# Patient Record
Sex: FEMALE | Race: WHITE | NOT HISPANIC OR LATINO | Employment: FULL TIME | URBAN - METROPOLITAN AREA
[De-identification: names, ages, dates, MRNs, and addresses within clinical notes are randomized per-mention and may not be internally consistent; named-entity substitution may affect disease eponyms.]

---

## 2018-04-19 ENCOUNTER — HOSPITAL ENCOUNTER (OUTPATIENT)
Dept: RADIOLOGY | Facility: MEDICAL CENTER | Age: 53
End: 2018-04-19

## 2018-04-20 ENCOUNTER — HOSPITAL ENCOUNTER (OUTPATIENT)
Dept: RADIOLOGY | Facility: MEDICAL CENTER | Age: 53
End: 2018-04-20
Attending: FAMILY MEDICINE
Payer: COMMERCIAL

## 2018-04-20 DIAGNOSIS — Z12.31 VISIT FOR SCREENING MAMMOGRAM: ICD-10-CM

## 2018-04-20 PROCEDURE — 77067 SCR MAMMO BI INCL CAD: CPT

## 2019-02-08 ENCOUNTER — HOSPITAL ENCOUNTER (OUTPATIENT)
Dept: LAB | Facility: MEDICAL CENTER | Age: 54
End: 2019-02-08
Attending: PHYSICIAN ASSISTANT
Payer: COMMERCIAL

## 2019-02-08 ENCOUNTER — OFFICE VISIT (OUTPATIENT)
Dept: URGENT CARE | Facility: CLINIC | Age: 54
End: 2019-02-08
Payer: COMMERCIAL

## 2019-02-08 ENCOUNTER — APPOINTMENT (OUTPATIENT)
Dept: RADIOLOGY | Facility: IMAGING CENTER | Age: 54
End: 2019-02-08
Attending: PHYSICIAN ASSISTANT
Payer: COMMERCIAL

## 2019-02-08 VITALS
WEIGHT: 293 LBS | RESPIRATION RATE: 18 BRPM | HEART RATE: 62 BPM | SYSTOLIC BLOOD PRESSURE: 134 MMHG | DIASTOLIC BLOOD PRESSURE: 84 MMHG | BODY MASS INDEX: 48.82 KG/M2 | OXYGEN SATURATION: 95 % | HEIGHT: 65 IN | TEMPERATURE: 97.9 F

## 2019-02-08 DIAGNOSIS — R10.13 EPIGASTRIC PAIN: ICD-10-CM

## 2019-02-08 DIAGNOSIS — R07.9 CHEST PAIN, UNSPECIFIED TYPE: ICD-10-CM

## 2019-02-08 LAB
ALBUMIN SERPL BCP-MCNC: 4.3 G/DL (ref 3.2–4.9)
ALBUMIN/GLOB SERPL: 1.4 G/DL
ALP SERPL-CCNC: 65 U/L (ref 30–99)
ALT SERPL-CCNC: 19 U/L (ref 2–50)
ANION GAP SERPL CALC-SCNC: 8 MMOL/L (ref 0–11.9)
AST SERPL-CCNC: 18 U/L (ref 12–45)
BASOPHILS # BLD AUTO: 0.5 % (ref 0–1.8)
BASOPHILS # BLD: 0.04 K/UL (ref 0–0.12)
BILIRUB SERPL-MCNC: 0.4 MG/DL (ref 0.1–1.5)
BUN SERPL-MCNC: 11 MG/DL (ref 8–22)
CALCIUM SERPL-MCNC: 10.1 MG/DL (ref 8.5–10.5)
CHLORIDE SERPL-SCNC: 101 MMOL/L (ref 96–112)
CO2 SERPL-SCNC: 29 MMOL/L (ref 20–33)
CREAT SERPL-MCNC: 0.63 MG/DL (ref 0.5–1.4)
EOSINOPHIL # BLD AUTO: 0.22 K/UL (ref 0–0.51)
EOSINOPHIL NFR BLD: 2.5 % (ref 0–6.9)
ERYTHROCYTE [DISTWIDTH] IN BLOOD BY AUTOMATED COUNT: 47.8 FL (ref 35.9–50)
GLOBULIN SER CALC-MCNC: 3.1 G/DL (ref 1.9–3.5)
GLUCOSE SERPL-MCNC: 92 MG/DL (ref 65–99)
HCT VFR BLD AUTO: 43.5 % (ref 37–47)
HGB BLD-MCNC: 13.6 G/DL (ref 12–16)
IMM GRANULOCYTES # BLD AUTO: 0.03 K/UL (ref 0–0.11)
IMM GRANULOCYTES NFR BLD AUTO: 0.3 % (ref 0–0.9)
LIPASE SERPL-CCNC: 12 U/L (ref 11–82)
LYMPHOCYTES # BLD AUTO: 2.56 K/UL (ref 1–4.8)
LYMPHOCYTES NFR BLD: 29.4 % (ref 22–41)
MCH RBC QN AUTO: 28.2 PG (ref 27–33)
MCHC RBC AUTO-ENTMCNC: 31.3 G/DL (ref 33.6–35)
MCV RBC AUTO: 90.2 FL (ref 81.4–97.8)
MONOCYTES # BLD AUTO: 0.5 K/UL (ref 0–0.85)
MONOCYTES NFR BLD AUTO: 5.7 % (ref 0–13.4)
NEUTROPHILS # BLD AUTO: 5.35 K/UL (ref 2–7.15)
NEUTROPHILS NFR BLD: 61.6 % (ref 44–72)
NRBC # BLD AUTO: 0 K/UL
NRBC BLD-RTO: 0 /100 WBC
PLATELET # BLD AUTO: 326 K/UL (ref 164–446)
PMV BLD AUTO: 10.6 FL (ref 9–12.9)
POTASSIUM SERPL-SCNC: 3.6 MMOL/L (ref 3.6–5.5)
PROT SERPL-MCNC: 7.4 G/DL (ref 6–8.2)
RBC # BLD AUTO: 4.82 M/UL (ref 4.2–5.4)
SODIUM SERPL-SCNC: 138 MMOL/L (ref 135–145)
WBC # BLD AUTO: 8.7 K/UL (ref 4.8–10.8)

## 2019-02-08 PROCEDURE — 99204 OFFICE O/P NEW MOD 45 MIN: CPT | Performed by: PHYSICIAN ASSISTANT

## 2019-02-08 PROCEDURE — 80053 COMPREHEN METABOLIC PANEL: CPT

## 2019-02-08 PROCEDURE — 71046 X-RAY EXAM CHEST 2 VIEWS: CPT | Mod: TC | Performed by: PHYSICIAN ASSISTANT

## 2019-02-08 PROCEDURE — 85025 COMPLETE CBC W/AUTO DIFF WBC: CPT

## 2019-02-08 PROCEDURE — 83690 ASSAY OF LIPASE: CPT

## 2019-02-08 PROCEDURE — 36415 COLL VENOUS BLD VENIPUNCTURE: CPT

## 2019-02-08 ASSESSMENT — ENCOUNTER SYMPTOMS
DIZZINESS: 0
COUGH: 0
NAUSEA: 0
ABDOMINAL PAIN: 1
FEVER: 0
WEAKNESS: 0
WEIGHT LOSS: 0
CHILLS: 0
BLOOD IN STOOL: 0
CONSTIPATION: 0
PALPITATIONS: 0
VOMITING: 0
DIAPHORESIS: 0
SHORTNESS OF BREATH: 0
DIARRHEA: 0
HEARTBURN: 1
HEADACHES: 0

## 2019-02-08 NOTE — PATIENT INSTRUCTIONS
Peptic Ulcer  A peptic ulcer is a sore in the lining of the esophagus (esophageal ulcer), the stomach (gastric ulcer), or the first part of the small intestine (duodenal ulcer). The ulcer causes gradual wearing away (erosion) into the deeper tissue.  What are the causes?  Normally, the lining of the stomach and the small intestine protects itself from the acid that digests food. The protective lining can be damaged by:  · An infection caused by a germ (bacterium) called Helicobacter pylori or H. pylori.  · Regular use of NSAIDs, such as ibuprofen or aspirin.  · Rare tumors in the stomach, small intestine, or pancreas (Zollinger-Fleming syndrome).  What increases the risk?  The following factors may make you more likely to develop this condition:  · Smoking.  · Having a family history of ulcer disease.  What are the signs or symptoms?  Symptoms of this condition include:  · Burning pain or gnawing in the area between the chest and the belly button. The pain may be worse on an empty stomach and at night.  · Heartburn.  · Nausea and vomiting.  · Bloating.  If the ulcer results in bleeding, it can cause:  · Black, tarry stools.  · Vomiting of bright red blood.  · Vomiting of material that looks like coffee grounds.  How is this diagnosed?  This condition may be diagnosed based on:  · Medical history and physical exam.  · Various tests or procedures, such as:  ¨ Blood tests, stool tests, or breath tests to check for the H. pylori bacterium.  ¨ An X-ray exam (upper gastrointestinal series) of the esophagus, stomach, and small intestine.  ¨ Upper endoscopy. The health care provider examines the esophagus, stomach, and small intestine using a small flexible tube that has a video camera at the end.  ¨ Biopsy. A tissue sample is removed to be examined under a microscope.  How is this treated?  Treatment for this condition may include:  · Eliminating the cause of the ulcer, such as smoking or the use of NSAIDs or  alcohol.  · Medicines to reduce the amount of acid in your digestive tract.  · Antibiotic medicines, if the ulcer is caused by the H. pylori bacterium.  · An upper endoscopy to treat a bleeding ulcer.  · Surgery, if the bleeding is severe or if the ulcer created a hole somewhere in the digestive system.  Follow these instructions at home:  · Avoid alcohol and caffeine.  · Do not use any tobacco products, such as cigarettes, chewing tobacco, and e-cigarettes. If you need help quitting, ask your health care provider.  · Take over-the-counter and prescription medicines only as told by your health care provider. Do not use over-the-counter medicines in place of prescription medicines unless your health care provider approves.  · Keep all follow-up visits as told by your health care provider. This is important.  Contact a health care provider if:  · Your symptoms do not improve within 7 days of starting treatment.  · You have ongoing indigestion or heartburn.  Get help right away if:  · You have sudden, sharp, or persistent pain in your abdomen.  · You have bloody or dark black, tarry stools.  · You vomit blood or material that looks like coffee grounds.  · You become light-headed or you feel faint.  · You become weak.  · You become sweaty or clammy.  This information is not intended to replace advice given to you by your health care provider. Make sure you discuss any questions you have with your health care provider.  Document Released: 12/15/2001 Document Revised: 05/22/2017 Document Reviewed: 09/17/2016  [employer] Interactive Patient Education © 2017 [employer]

## 2019-02-08 NOTE — PROGRESS NOTES
"Subjective:      An Rainey is a 53 y.o. female who presents with Chest Pain (started today and states its radiating to her back and states its hard to breathe at times)            Abdominal Pain   This is a new problem. The current episode started today. The onset quality is sudden. The pain is located in the epigastric region. The pain is moderate. The quality of the pain is dull. The abdominal pain radiates to the back. Pertinent negatives include no constipation, diarrhea, fever, headaches, melena, nausea, vomiting or weight loss. Nothing aggravates the pain. The pain is relieved by nothing. Her past medical history is significant for GERD. There is no history of abdominal surgery.       Review of Systems   Constitutional: Negative for chills, diaphoresis, fever, malaise/fatigue and weight loss.   Respiratory: Negative for cough and shortness of breath.    Cardiovascular: Negative for chest pain and palpitations.   Gastrointestinal: Positive for abdominal pain and heartburn. Negative for blood in stool, constipation, diarrhea, melena, nausea and vomiting.   Skin: Negative for itching and rash.   Neurological: Negative for dizziness, weakness and headaches.   All other systems reviewed and are negative.         Objective:     /84 (BP Location: Left arm, Patient Position: Sitting, BP Cuff Size: Large adult)   Pulse 62   Temp 36.6 °C (97.9 °F) (Temporal)   Resp 18   Ht 1.651 m (5' 5\")   Wt (!) 144.7 kg (319 lb)   SpO2 95%   BMI 53.08 kg/m²      Physical Exam   Constitutional: She is oriented to person, place, and time. She appears well-developed and well-nourished. She is active.  Non-toxic appearance. She does not have a sickly appearance. She does not appear ill. No distress.   HENT:   Head: Normocephalic and atraumatic.   Right Ear: External ear normal.   Left Ear: External ear normal.   Nose: Nose normal.   Mouth/Throat: Oropharynx is clear and moist.   Eyes: Conjunctivae, EOM and lids " are normal.   Neck: Normal range of motion and full passive range of motion without pain. Neck supple.   Cardiovascular: Normal rate, regular rhythm, S1 normal, S2 normal and normal heart sounds.  Exam reveals no gallop and no friction rub.    No murmur heard.  Pulmonary/Chest: Effort normal and breath sounds normal. No respiratory distress. She has no decreased breath sounds. She has no wheezes. She has no rales. She exhibits no tenderness.   Abdominal: Soft. Normal appearance and bowel sounds are normal. She exhibits no shifting dullness, no distension, no pulsatile liver, no fluid wave, no abdominal bruit, no ascites, no pulsatile midline mass and no mass. There is tenderness. There is no rigidity, no rebound, no guarding, no CVA tenderness, no tenderness at McBurney's point and negative Nova's sign. No hernia.   Abdomen: PTP in the epigastric area .  Soft and nondistended. Normal bowel sounds. No hepatosplenomegaly or masses, or hernias. No rebound or guarding.     Musculoskeletal: Normal range of motion. She exhibits no edema, tenderness or deformity.   Neurological: She is alert and oriented to person, place, and time.   Skin: Skin is warm, dry and intact. She is not diaphoretic.   Psychiatric: She has a normal mood and affect. Her speech is normal and behavior is normal. Judgment and thought content normal. Cognition and memory are normal.   Vitals reviewed.              Assessment/Plan:   Patient is a 53-year-old female who presents with epigastric pain onset today.  She states that the pain came on suddenly and radiates to her back.  She denies nausea vomiting diarrhea or constipation.  She denies any black or bloody stools.  She states that she does take NSAIDs regularly every week for years for back pain.  No PMH of abdominal surgeries.  On exam she has pain to palpation in the epigastric area with no rebound tenderness.  Chest x-ray EKG are within normal limits.  Discussed diagnosis differential  including GERD, peptic ulcer, pancreatitis, cholecystitis, gastritis.  Patient was given GI cocktail which significantly relieved her symptoms.  Very possible she could be experiencing peptic ulcer due to NSAID use.  Will have her draw labs to rule out other etiologies.  We will also have her start omeprazole.  Further treatment pending labs.  Strict ED precautions were discussed.    1. Epigastric pain    - EKG  - DX-CHEST-2 VIEWS; Future  - hyoscyamine-maalox plus-lidocaine viscous (GI COCKTAIL); Take 30 mL by mouth Once for 1 dose.  Dispense: 30 mL; Refill: 0  - Comp Metabolic Panel; Future  - CBC WITH DIFFERENTIAL; Future  - LIPASE; Future  - H.PYLORI STOOL ANTIGEN; Future    Differential diagnosis, natural history, supportive care discussed. Follow-up with primary care provider within 7-10 days, emergency room precautions discussed.  Patient and/or family appears understanding of information.  Handout and review of patients diagnosis and treatment was discussed extensively.

## 2019-02-09 ENCOUNTER — HOSPITAL ENCOUNTER (OUTPATIENT)
Facility: MEDICAL CENTER | Age: 54
End: 2019-02-09
Attending: PHYSICIAN ASSISTANT
Payer: COMMERCIAL

## 2019-02-09 DIAGNOSIS — R10.13 EPIGASTRIC PAIN: ICD-10-CM

## 2019-02-09 LAB — H PYLORI AG STL QL IA: NOT DETECTED

## 2019-02-09 PROCEDURE — 87338 HPYLORI STOOL AG IA: CPT

## 2019-04-27 ENCOUNTER — HOSPITAL ENCOUNTER (OUTPATIENT)
Facility: MEDICAL CENTER | Age: 54
End: 2019-04-27
Attending: EMERGENCY MEDICINE | Admitting: HOSPITALIST
Payer: COMMERCIAL

## 2019-04-27 ENCOUNTER — APPOINTMENT (OUTPATIENT)
Dept: RADIOLOGY | Facility: MEDICAL CENTER | Age: 54
End: 2019-04-27
Attending: INTERNAL MEDICINE
Payer: COMMERCIAL

## 2019-04-27 ENCOUNTER — APPOINTMENT (OUTPATIENT)
Dept: RADIOLOGY | Facility: MEDICAL CENTER | Age: 54
End: 2019-04-27
Attending: EMERGENCY MEDICINE
Payer: COMMERCIAL

## 2019-04-27 VITALS
WEIGHT: 293 LBS | RESPIRATION RATE: 19 BRPM | DIASTOLIC BLOOD PRESSURE: 71 MMHG | TEMPERATURE: 97 F | HEIGHT: 65 IN | BODY MASS INDEX: 48.82 KG/M2 | OXYGEN SATURATION: 96 % | HEART RATE: 53 BPM | SYSTOLIC BLOOD PRESSURE: 154 MMHG

## 2019-04-27 DIAGNOSIS — R07.9 ACUTE CHEST PAIN: ICD-10-CM

## 2019-04-27 DIAGNOSIS — L30.9 DERMATITIS: ICD-10-CM

## 2019-04-27 PROBLEM — I10 HTN (HYPERTENSION): Status: ACTIVE | Noted: 2019-04-27

## 2019-04-27 PROBLEM — F32.A DEPRESSION: Status: ACTIVE | Noted: 2019-04-27

## 2019-04-27 LAB
ANION GAP SERPL CALC-SCNC: 7 MMOL/L (ref 0–11.9)
BASOPHILS # BLD AUTO: 0.6 % (ref 0–1.8)
BASOPHILS # BLD: 0.05 K/UL (ref 0–0.12)
BUN SERPL-MCNC: 10 MG/DL (ref 8–22)
CALCIUM SERPL-MCNC: 9.1 MG/DL (ref 8.5–10.5)
CHLORIDE SERPL-SCNC: 102 MMOL/L (ref 96–112)
CO2 SERPL-SCNC: 27 MMOL/L (ref 20–33)
CREAT SERPL-MCNC: 0.62 MG/DL (ref 0.5–1.4)
EKG IMPRESSION: NORMAL
EOSINOPHIL # BLD AUTO: 0.32 K/UL (ref 0–0.51)
EOSINOPHIL NFR BLD: 3.9 % (ref 0–6.9)
ERYTHROCYTE [DISTWIDTH] IN BLOOD BY AUTOMATED COUNT: 49.5 FL (ref 35.9–50)
GLUCOSE SERPL-MCNC: 96 MG/DL (ref 65–99)
HCT VFR BLD AUTO: 41.6 % (ref 37–47)
HGB BLD-MCNC: 13.4 G/DL (ref 12–16)
IMM GRANULOCYTES # BLD AUTO: 0.02 K/UL (ref 0–0.11)
IMM GRANULOCYTES NFR BLD AUTO: 0.2 % (ref 0–0.9)
LYMPHOCYTES # BLD AUTO: 2.38 K/UL (ref 1–4.8)
LYMPHOCYTES NFR BLD: 28.8 % (ref 22–41)
MCH RBC QN AUTO: 28.4 PG (ref 27–33)
MCHC RBC AUTO-ENTMCNC: 32.2 G/DL (ref 33.6–35)
MCV RBC AUTO: 88.1 FL (ref 81.4–97.8)
MONOCYTES # BLD AUTO: 0.59 K/UL (ref 0–0.85)
MONOCYTES NFR BLD AUTO: 7.2 % (ref 0–13.4)
NEUTROPHILS # BLD AUTO: 4.89 K/UL (ref 2–7.15)
NEUTROPHILS NFR BLD: 59.3 % (ref 44–72)
NRBC # BLD AUTO: 0 K/UL
NRBC BLD-RTO: 0 /100 WBC
PLATELET # BLD AUTO: 305 K/UL (ref 164–446)
PMV BLD AUTO: 10.5 FL (ref 9–12.9)
POTASSIUM SERPL-SCNC: 3.8 MMOL/L (ref 3.6–5.5)
RBC # BLD AUTO: 4.72 M/UL (ref 4.2–5.4)
SODIUM SERPL-SCNC: 136 MMOL/L (ref 135–145)
TROPONIN I SERPL-MCNC: <0.01 NG/ML (ref 0–0.04)
WBC # BLD AUTO: 8.3 K/UL (ref 4.8–10.8)

## 2019-04-27 PROCEDURE — 700111 HCHG RX REV CODE 636 W/ 250 OVERRIDE (IP)

## 2019-04-27 PROCEDURE — A9270 NON-COVERED ITEM OR SERVICE: HCPCS | Performed by: INTERNAL MEDICINE

## 2019-04-27 PROCEDURE — 700102 HCHG RX REV CODE 250 W/ 637 OVERRIDE(OP): Performed by: INTERNAL MEDICINE

## 2019-04-27 PROCEDURE — 99236 HOSP IP/OBS SAME DATE HI 85: CPT | Performed by: INTERNAL MEDICINE

## 2019-04-27 PROCEDURE — 71045 X-RAY EXAM CHEST 1 VIEW: CPT

## 2019-04-27 PROCEDURE — 700102 HCHG RX REV CODE 250 W/ 637 OVERRIDE(OP): Performed by: EMERGENCY MEDICINE

## 2019-04-27 PROCEDURE — G0378 HOSPITAL OBSERVATION PER HR: HCPCS

## 2019-04-27 PROCEDURE — 700111 HCHG RX REV CODE 636 W/ 250 OVERRIDE (IP): Performed by: INTERNAL MEDICINE

## 2019-04-27 PROCEDURE — 85025 COMPLETE CBC W/AUTO DIFF WBC: CPT

## 2019-04-27 PROCEDURE — A9270 NON-COVERED ITEM OR SERVICE: HCPCS | Performed by: EMERGENCY MEDICINE

## 2019-04-27 PROCEDURE — 36415 COLL VENOUS BLD VENIPUNCTURE: CPT

## 2019-04-27 PROCEDURE — 93005 ELECTROCARDIOGRAM TRACING: CPT | Performed by: EMERGENCY MEDICINE

## 2019-04-27 PROCEDURE — 93005 ELECTROCARDIOGRAM TRACING: CPT | Performed by: HOSPITALIST

## 2019-04-27 PROCEDURE — 93005 ELECTROCARDIOGRAM TRACING: CPT

## 2019-04-27 PROCEDURE — 306313 ANTI-EMBOLISM STOCKINGS XXXLG REG: Performed by: INTERNAL MEDICINE

## 2019-04-27 PROCEDURE — A9502 TC99M TETROFOSMIN: HCPCS

## 2019-04-27 PROCEDURE — 700102 HCHG RX REV CODE 250 W/ 637 OVERRIDE(OP): Performed by: HOSPITALIST

## 2019-04-27 PROCEDURE — 93010 ELECTROCARDIOGRAM REPORT: CPT | Performed by: INTERNAL MEDICINE

## 2019-04-27 PROCEDURE — 96374 THER/PROPH/DIAG INJ IV PUSH: CPT

## 2019-04-27 PROCEDURE — 96375 TX/PRO/DX INJ NEW DRUG ADDON: CPT

## 2019-04-27 PROCEDURE — A9270 NON-COVERED ITEM OR SERVICE: HCPCS | Performed by: HOSPITALIST

## 2019-04-27 PROCEDURE — 80048 BASIC METABOLIC PNL TOTAL CA: CPT

## 2019-04-27 PROCEDURE — 700111 HCHG RX REV CODE 636 W/ 250 OVERRIDE (IP): Performed by: HOSPITALIST

## 2019-04-27 PROCEDURE — 84484 ASSAY OF TROPONIN QUANT: CPT

## 2019-04-27 PROCEDURE — 99285 EMERGENCY DEPT VISIT HI MDM: CPT

## 2019-04-27 RX ORDER — AMOXICILLIN 250 MG
2 CAPSULE ORAL 2 TIMES DAILY
Status: DISCONTINUED | OUTPATIENT
Start: 2019-04-27 | End: 2019-04-27 | Stop reason: HOSPADM

## 2019-04-27 RX ORDER — HYDRALAZINE HYDROCHLORIDE 20 MG/ML
10 INJECTION INTRAMUSCULAR; INTRAVENOUS EVERY 6 HOURS PRN
Status: DISCONTINUED | OUTPATIENT
Start: 2019-04-27 | End: 2019-04-27 | Stop reason: HOSPADM

## 2019-04-27 RX ORDER — HYDROCHLOROTHIAZIDE 25 MG/1
25 TABLET ORAL DAILY
Status: DISCONTINUED | OUTPATIENT
Start: 2019-04-27 | End: 2019-04-27 | Stop reason: HOSPADM

## 2019-04-27 RX ORDER — REGADENOSON 0.08 MG/ML
INJECTION, SOLUTION INTRAVENOUS
Status: COMPLETED
Start: 2019-04-27 | End: 2019-04-27

## 2019-04-27 RX ORDER — NITROGLYCERIN 0.4 MG/1
0.4 TABLET SUBLINGUAL
Status: DISCONTINUED | OUTPATIENT
Start: 2019-04-27 | End: 2019-04-27 | Stop reason: HOSPADM

## 2019-04-27 RX ORDER — BISACODYL 10 MG
10 SUPPOSITORY, RECTAL RECTAL
Status: DISCONTINUED | OUTPATIENT
Start: 2019-04-27 | End: 2019-04-27 | Stop reason: HOSPADM

## 2019-04-27 RX ORDER — POLYETHYLENE GLYCOL 3350 17 G/17G
1 POWDER, FOR SOLUTION ORAL
Status: DISCONTINUED | OUTPATIENT
Start: 2019-04-27 | End: 2019-04-27 | Stop reason: HOSPADM

## 2019-04-27 RX ORDER — ASPIRIN 81 MG/1
324 TABLET, CHEWABLE ORAL ONCE
Status: COMPLETED | OUTPATIENT
Start: 2019-04-27 | End: 2019-04-27

## 2019-04-27 RX ORDER — LISINOPRIL 20 MG/1
20 TABLET ORAL DAILY
Qty: 30 TAB | Refills: 0 | Status: SHIPPED | OUTPATIENT
Start: 2019-04-28 | End: 2019-12-09

## 2019-04-27 RX ORDER — LISINOPRIL 20 MG/1
20 TABLET ORAL
Status: DISCONTINUED | OUTPATIENT
Start: 2019-04-27 | End: 2019-04-27 | Stop reason: HOSPADM

## 2019-04-27 RX ORDER — HYDROXYZINE HYDROCHLORIDE 25 MG/1
25 TABLET, FILM COATED ORAL EVERY 8 HOURS PRN
Qty: 12 TAB | Refills: 0 | Status: SHIPPED | OUTPATIENT
Start: 2019-04-27 | End: 2019-04-27

## 2019-04-27 RX ORDER — AMLODIPINE BESYLATE 10 MG/1
10 TABLET ORAL DAILY
Qty: 30 TAB | Refills: 0 | Status: SHIPPED | OUTPATIENT
Start: 2019-04-28 | End: 2019-12-09

## 2019-04-27 RX ORDER — FAMOTIDINE 20 MG/1
20 TABLET, FILM COATED ORAL 2 TIMES DAILY
Status: DISCONTINUED | OUTPATIENT
Start: 2019-04-27 | End: 2019-04-27 | Stop reason: HOSPADM

## 2019-04-27 RX ORDER — MORPHINE SULFATE 4 MG/ML
2-4 INJECTION, SOLUTION INTRAMUSCULAR; INTRAVENOUS
Status: DISCONTINUED | OUTPATIENT
Start: 2019-04-27 | End: 2019-04-27 | Stop reason: HOSPADM

## 2019-04-27 RX ORDER — AMLODIPINE BESYLATE 10 MG/1
10 TABLET ORAL
Status: DISCONTINUED | OUTPATIENT
Start: 2019-04-27 | End: 2019-04-27 | Stop reason: HOSPADM

## 2019-04-27 RX ORDER — FLUTICASONE PROPIONATE 50 MCG
1 SPRAY, SUSPENSION (ML) NASAL DAILY
COMMUNITY

## 2019-04-27 RX ORDER — BENZOCAINE/MENTHOL 6 MG-10 MG
1 LOZENGE MUCOUS MEMBRANE 2 TIMES DAILY
Qty: 1 TUBE | Refills: 0 | Status: SHIPPED | OUTPATIENT
Start: 2019-04-27 | End: 2019-04-27

## 2019-04-27 RX ORDER — CHLORAL HYDRATE 500 MG
1000 CAPSULE ORAL DAILY
Status: SHIPPED | COMMUNITY
End: 2022-10-13

## 2019-04-27 RX ORDER — ROSUVASTATIN CALCIUM 20 MG/1
20 TABLET, COATED ORAL EVERY EVENING
Status: DISCONTINUED | OUTPATIENT
Start: 2019-04-27 | End: 2019-04-27 | Stop reason: HOSPADM

## 2019-04-27 RX ORDER — ENALAPRILAT 1.25 MG/ML
1.25 INJECTION INTRAVENOUS ONCE
Status: COMPLETED | OUTPATIENT
Start: 2019-04-27 | End: 2019-04-27

## 2019-04-27 RX ADMIN — ENALAPRILAT 1.25 MG: 1.25 INJECTION INTRAVENOUS at 14:34

## 2019-04-27 RX ADMIN — MORPHINE SULFATE 2 MG: 4 INJECTION INTRAVENOUS at 09:17

## 2019-04-27 RX ADMIN — SERTRALINE HYDROCHLORIDE 50 MG: 50 TABLET ORAL at 10:34

## 2019-04-27 RX ADMIN — AMLODIPINE BESYLATE 10 MG: 10 TABLET ORAL at 09:17

## 2019-04-27 RX ADMIN — NITROGLYCERIN 1 INCH: 20 OINTMENT TOPICAL at 04:22

## 2019-04-27 RX ADMIN — LISINOPRIL 20 MG: 20 TABLET ORAL at 09:17

## 2019-04-27 RX ADMIN — LIDOCAINE HYDROCHLORIDE 30 ML: 20 SOLUTION OROPHARYNGEAL at 04:22

## 2019-04-27 RX ADMIN — REGADENOSON 0.4 MG: 0.08 INJECTION, SOLUTION INTRAVENOUS at 13:08

## 2019-04-27 RX ADMIN — ASPIRIN 81 MG 324 MG: 81 TABLET ORAL at 04:22

## 2019-04-27 RX ADMIN — HYDROCHLOROTHIAZIDE 25 MG: 25 TABLET ORAL at 09:17

## 2019-04-27 RX ADMIN — FAMOTIDINE 20 MG: 20 TABLET ORAL at 09:17

## 2019-04-27 ASSESSMENT — ENCOUNTER SYMPTOMS
FEVER: 0
MYALGIAS: 0
ABDOMINAL PAIN: 0
DIARRHEA: 0
TINGLING: 0
DIZZINESS: 1
CHILLS: 0
HEADACHES: 0
DEPRESSION: 0
SORE THROAT: 0
PHOTOPHOBIA: 0
VOMITING: 0
NAUSEA: 0
COUGH: 0
FOCAL WEAKNESS: 0
SENSORY CHANGE: 1
SHORTNESS OF BREATH: 0
WHEEZING: 0
PALPITATIONS: 0

## 2019-04-27 ASSESSMENT — LIFESTYLE VARIABLES
CONSUMPTION TOTAL: NEGATIVE
ON A TYPICAL DAY WHEN YOU DRINK ALCOHOL HOW MANY DRINKS DO YOU HAVE: 4
HAVE PEOPLE ANNOYED YOU BY CRITICIZING YOUR DRINKING: NO
TOTAL SCORE: 0
EVER_SMOKED: NEVER
ALCOHOL_USE: YES
EVER FELT BAD OR GUILTY ABOUT YOUR DRINKING: NO
TOTAL SCORE: 0
EVER HAD A DRINK FIRST THING IN THE MORNING TO STEADY YOUR NERVES TO GET RID OF A HANGOVER: NO
HOW MANY TIMES IN THE PAST YEAR HAVE YOU HAD 5 OR MORE DRINKS IN A DAY: 0
TOTAL SCORE: 0
AVERAGE NUMBER OF DAYS PER WEEK YOU HAVE A DRINK CONTAINING ALCOHOL: 1
HAVE YOU EVER FELT YOU SHOULD CUT DOWN ON YOUR DRINKING: NO

## 2019-04-27 ASSESSMENT — PATIENT HEALTH QUESTIONNAIRE - PHQ9
2. FEELING DOWN, DEPRESSED, IRRITABLE, OR HOPELESS: NOT AT ALL
SUM OF ALL RESPONSES TO PHQ9 QUESTIONS 1 AND 2: 0
1. LITTLE INTEREST OR PLEASURE IN DOING THINGS: NOT AT ALL

## 2019-04-27 NOTE — PROGRESS NOTES
MD updated on status of dobutamine stress, must be preformed by Cardiology, NP. Nuc stress ordered. Nuc med updated.

## 2019-04-27 NOTE — ED PROVIDER NOTES
"ED Provider Note    ED Provider Note      Primary care provider: Pcp Pt States None    CHIEF COMPLAINT  Chief Complaint   Patient presents with   • Chest Pain   • Numbness     left arm        HPI  An Rainey is a 53 y.o. female who presents to the Emergency Department with chief complaint of chest pain.  Patient stated this began earlier this evening about 8:52 PM she has had intermittent episodes of the chest pain since that time substernal heavy sensation some radiation in the and also reports a bandlike numbness in her left arm.  Minor shortness of breath no diaphoresis no nausea does seem to get worse with exertion.  No fevers no chills no cough no abdominal pain no other acute symptoms or concerns at this time.    REVIEW OF SYSTEMS  10 systems reviewed and otherwise negative, pertinent positives and negatives listed in the history of present illness.    PAST MEDICAL HISTORY   has a past medical history of Lymph edema.    SURGICAL HISTORY  patient denies any surgical history    SOCIAL HISTORY  Social History   Substance Use Topics   • Smoking status: Never Smoker   • Smokeless tobacco: Never Used   • Alcohol use Yes      Comment: occ      History   Drug Use No       FAMILY HISTORY  Non-Contributory    CURRENT MEDICATIONS  Home Medications     Reviewed by Kiel Alvares R.N. (Registered Nurse) on 04/27/19 at 0311  Med List Status: Partial   Medication Last Dose Status   cetirizine (ZYRTEC) 10 MG TABS  Active   hydrochlorothiazide (HYDRODIURIL) 25 MG TABS  Active   NS SOLN 60 mL with albuterol 2.5 mg/0.5 mL NEBU 5 mL  Active   ranitidine (ZANTAC) 150 MG TABS  Active   sertraline (ZOLOFT) 50 MG TABS  Active                ALLERGIES  No Known Allergies    PHYSICAL EXAM  VITAL SIGNS: BP (!) 214/104   Pulse (!) 55   Temp 36.5 °C (97.7 °F) (Temporal)   Resp 16   Ht 1.651 m (5' 5\")   Wt (!) 149.7 kg (330 lb)   SpO2 96%   BMI 54.91 kg/m²   Pulse ox interpretation: I interpret this pulse ox as " normal.  Constitutional: Alert and oriented x 3, minimal distress  HEENT: Atraumatic normocephalic, pupils are equal round reactive to light extraocular movements are intact. The nares is clear, external ears are normal, mouth shows moist mucous membranes  Neck: Supple, no JVD no tracheal deviation  Cardiovascular: Regular rate and rhythm no murmur rub or gallop 2+ pulses peripherally x4  Thorax & Lungs: No respiratory distress, no wheezes rales or rhonchi, No chest tenderness.   GI: Soft nontender nondistended positive bowel sounds, no peritoneal signs morbidly obese  Skin: Warm dry no acute rash or lesion  Musculoskeletal: Profound pitting edema bilateral lower extremities to the knee.  Neurologic: Cranial nerves III through XII are grossly intact, no sensory deficit, no cerebellar dysfunction   Psychiatric: Appropriate affect for situation at this time      DIAGNOSTIC STUDIES / PROCEDURES  LABS    Results for orders placed or performed during the hospital encounter of 04/27/19   CBC w/ Differential   Result Value Ref Range    WBC 8.3 4.8 - 10.8 K/uL    RBC 4.72 4.20 - 5.40 M/uL    Hemoglobin 13.4 12.0 - 16.0 g/dL    Hematocrit 41.6 37.0 - 47.0 %    MCV 88.1 81.4 - 97.8 fL    MCH 28.4 27.0 - 33.0 pg    MCHC 32.2 (L) 33.6 - 35.0 g/dL    RDW 49.5 35.9 - 50.0 fL    Platelet Count 305 164 - 446 K/uL    MPV 10.5 9.0 - 12.9 fL    Neutrophils-Polys 59.30 44.00 - 72.00 %    Lymphocytes 28.80 22.00 - 41.00 %    Monocytes 7.20 0.00 - 13.40 %    Eosinophils 3.90 0.00 - 6.90 %    Basophils 0.60 0.00 - 1.80 %    Immature Granulocytes 0.20 0.00 - 0.90 %    Nucleated RBC 0.00 /100 WBC    Neutrophils (Absolute) 4.89 2.00 - 7.15 K/uL    Lymphs (Absolute) 2.38 1.00 - 4.80 K/uL    Monos (Absolute) 0.59 0.00 - 0.85 K/uL    Eos (Absolute) 0.32 0.00 - 0.51 K/uL    Baso (Absolute) 0.05 0.00 - 0.12 K/uL    Immature Granulocytes (abs) 0.02 0.00 - 0.11 K/uL    NRBC (Absolute) 0.00 K/uL   Basic Metabolic Panel (BMP)   Result Value Ref Range     Sodium 136 135 - 145 mmol/L    Potassium 3.8 3.6 - 5.5 mmol/L    Chloride 102 96 - 112 mmol/L    Co2 27 20 - 33 mmol/L    Glucose 96 65 - 99 mg/dL    Bun 10 8 - 22 mg/dL    Creatinine 0.62 0.50 - 1.40 mg/dL    Calcium 9.1 8.5 - 10.5 mg/dL    Anion Gap 7.0 0.0 - 11.9   Troponin STAT   Result Value Ref Range    Troponin I <0.01 0.00 - 0.04 ng/mL   ESTIMATED GFR   Result Value Ref Range    GFR If African American >60 >60 mL/min/1.73 m 2    GFR If Non African American >60 >60 mL/min/1.73 m 2   EKG (NOW)   Result Value Ref Range    Report       [facility] Emergency Dept.    Test Date:  2019  Pt Name:    CESARIO BOSS                  Department: ER  MRN:        [id number]                      Room:       [facility]  Gender:     Female                       Technician: [id number]  :        1965                   Requested By:ER TRIAGE PROTOCOL  Order #:    [id number]                    Reading MD: MIKE WALDROP MD    Measurements  Intervals                                Axis  Rate:       57                           P:          41  NY:         192                          QRS:        -16  QRSD:       104                          T:          18  QT:         452  QTc:        440    Interpretive Statements  SINUS BRADYCARDIA  BORDERLINE LEFT AXIS DEVIATION  BORDERLINE R WAVE PROGRESSION, ANTERIOR LEADS  Compared to ECG 2014 02:42:53  Sinus arrhythmia no longer present    Electronically Signed On 2019 5:16:32 PDT by MIKE WALDROP MD         All labs reviewed by me.      RADIOLOGY  No orders to display     The radiologist's interpretation of all radiological studies have been reviewed by me.    COURSE & MEDICAL DECISION MAKING  Pertinent Labs & Imaging studies reviewed. (See chart for details)    4:03 AM - Patient seen and examined at bedside.       Medical Decision Making: Troponin negative x-ray negative EKG nonischemic with concerning story for angina patient will be admitted for  "further evaluation and treatment admitted in guarded condition.    BP (!) 214/104   Pulse (!) 55   Temp 36.5 °C (97.7 °F) (Temporal)   Resp 16   Ht 1.651 m (5' 5\")   Wt (!) 149.7 kg (330 lb)   SpO2 96%   BMI 54.91 kg/m²             FINAL IMPRESSION  1.  Chest pain  2.  Peripheral edema      This dictation has been created using voice recognition software and/or scribes. The accuracy of the dictation is limited by the abilities of the software and the expertise of the scribes. I expect there may be some errors of grammar and possibly content. I made every attempt to manually correct the errors within my dictation. However, errors related to voice recognition software and/or scribes may still exist and should be interpreted within the appropriate context.            "

## 2019-04-27 NOTE — ASSESSMENT & PLAN NOTE
Patient had a normal nuclear medicine stress test in 2014.  However, she does have multiple comorbidities including her age, hypertension, and morbid obesity therefore I will admit her to the hospital and monitor closely on telemetry for any evidence of cardiac arrhythmias.  I will trend troponin levels and obtain serial EKGs.  I will check a lipid panel and start her on an aspirin and a statin as well as as needed morphine, oxygen, and nitroglycerin for pain.  If her cardiac enzymes remain negative, then she will be further risk stratified with a nuclear medicine stress test.

## 2019-04-27 NOTE — PROGRESS NOTES
Please page Hospitalist Dr Trevino today [phone] for any updates, questions, and orders.  Thank you.

## 2019-04-27 NOTE — ASSESSMENT & PLAN NOTE
Blood pressure is poorly controlled, I will continue her home hydrochlorothiazide but I will place her on as needed hydralazine and depending on her breakthrough need, we will adjust her home medications.

## 2019-04-27 NOTE — H&P
Hospital Medicine History & Physical Note    Date of Service  4/27/2019    Primary Care Physician  Pcp Pt States None    Consultants  None    Code Status  Full    Chief Complaint  Chief Complaint   Patient presents with   • Chest Pain   • Numbness     left arm        History of Presenting Illness  53 y.o. female who presented on 4/27/2019 with chest pain.  This is a pleasant middle-aged female with a history of hypertension, depression, and morbid obesity who comes in with complaints of chest pain.  She states that her symptoms began at rest earlier this evening.  She developed left-sided chest pain which radiated eventually to her neck and jaw.  It was associated with nausea, the shakes, as well as a left arm numbness.  She denies any lightheadedness, diaphoresis, or palpitations.  She did try taking aspirin but there is no alleviation.  She denies any aggravating factors.  She states that this chest pain is much worse in intensity than the one she had in 2014 for which she had a negative nuclear medicine work-up.  Her family history is positive for her father who had his first MI in his 50s.  Otherwise the patient states that prior to that she was in her usual state of health, no fevers, chills, rhinorrhea, cough, URIs, abdominal pain, diarrhea or dysuria.    Review of Systems  Review of Systems   Constitutional: Negative for chills and fever.   HENT: Negative for congestion and sore throat.    Eyes: Negative for photophobia.   Respiratory: Negative for cough, shortness of breath and wheezing.    Cardiovascular: Positive for chest pain. Negative for palpitations.   Gastrointestinal: Negative for abdominal pain, diarrhea, nausea and vomiting.   Genitourinary: Negative for dysuria.   Musculoskeletal: Negative for myalgias.   Skin: Negative.    Neurological: Positive for dizziness and sensory change. Negative for tingling, focal weakness and headaches.   Psychiatric/Behavioral: Negative for depression and suicidal  ideas.       Past Medical History  Past Medical History:   Diagnosis Date   • HTN (hypertension) 2019   • Lymph edema        Surgical History  None    Family History  Father with first MI at 51    Social History  Social History   Substance Use Topics   • Smoking status: Never Smoker   • Smokeless tobacco: Never Used   • Alcohol use Yes      Comment: occ       Allergies  No Known Allergies    Medications  No current facility-administered medications on file prior to encounter.      Current Outpatient Prescriptions on File Prior to Encounter   Medication Sig Dispense Refill   • NS SOLN 60 mL with albuterol 2.5 mg/0.5 mL NEBU 5 mL 5 mg/hr by Nebulization route.     • hydrochlorothiazide (HYDRODIURIL) 25 MG TABS Take 25 mg by mouth every day.     • sertraline (ZOLOFT) 50 MG TABS Take 50 mg by mouth every day.     • cetirizine (ZYRTEC) 10 MG TABS Take 10 mg by mouth every day.     • ranitidine (ZANTAC) 150 MG TABS Take 150 mg by mouth 2 times a day.         Physical Exam  Hemodynamics  Temp (24hrs), Av.5 °C (97.7 °F), Min:36.5 °C (97.7 °F), Max:36.5 °C (97.7 °F)   Temperature: 36.5 °C (97.7 °F)  Pulse  Av.2  Min: 54  Max: 61 Heart Rate (Monitored): (!) 58  Blood Pressure: (!) 179/82, NIBP: (!) 165/87      Respiratory      Respiration: 16, Pulse Oximetry: 97 %             Physical Exam   Constitutional: She is oriented to person, place, and time. No distress.   Morbidly obese   HENT:   Head: Normocephalic and atraumatic.   Right Ear: External ear normal.   Left Ear: External ear normal.   Eyes: EOM are normal. Right eye exhibits no discharge. Left eye exhibits no discharge.   Neck: Neck supple. No JVD present.   Cardiovascular: Normal rate, regular rhythm and normal heart sounds.    Pulmonary/Chest: Effort normal and breath sounds normal. No respiratory distress. She exhibits no tenderness.   Abdominal: Soft. Bowel sounds are normal. She exhibits no distension. There is no tenderness.   Musculoskeletal:  Normal range of motion. She exhibits no edema.   Neurological: She is alert and oriented to person, place, and time. No cranial nerve deficit.   Skin: Skin is warm and dry. She is not diaphoretic. No erythema.   Psychiatric: She has a normal mood and affect. Her behavior is normal.   Nursing note and vitals reviewed.    Capillary refill less than 3 seconds, distal pulses intact    Laboratory:  Recent Labs      04/27/19   0330   WBC  8.3   RBC  4.72   HEMOGLOBIN  13.4   HEMATOCRIT  41.6   MCV  88.1   MCH  28.4   MCHC  32.2*   RDW  49.5   PLATELETCT  305   MPV  10.5     Recent Labs      04/27/19   0330   SODIUM  136   POTASSIUM  3.8   CHLORIDE  102   CO2  27   GLUCOSE  96   BUN  10   CREATININE  0.62   CALCIUM  9.1     Recent Labs      04/27/19 0330   GLUCOSE  96                 Lab Results   Component Value Date    TROPONINI <0.01 04/27/2019       Imaging  Dx-chest-portable (1 View)    Result Date: 4/27/2019 4/27/2019 4:26 AM HISTORY/REASON FOR EXAM:  Chest Pain TECHNIQUE/EXAM DESCRIPTION AND NUMBER OF VIEWS: Single portable view of the chest. COMPARISON: 2/8/2019 FINDINGS: No pulmonary infiltrates or consolidations are noted. No pleural effusion. No pneumothorax. Stable enlarged cardiopericardial silhouette.     1. No acute cardiopulmonary abnormalities are identified.        Assessment/Plan:  Anticipate that patient will need less than 2 midnights for management of the discussed medical issues.    * Chest pain- (present on admission)   Assessment & Plan    Patient had a normal nuclear medicine stress test in 2014.  However, she does have multiple comorbidities including her age, hypertension, and morbid obesity therefore I will admit her to the hospital and monitor closely on telemetry for any evidence of cardiac arrhythmias.  I will trend troponin levels and obtain serial EKGs.  I will check a lipid panel and start her on an aspirin and a statin as well as as needed morphine, oxygen, and nitroglycerin for pain.   If her cardiac enzymes remain negative, then she will be further risk stratified with a nuclear medicine stress test.     HTN (hypertension)   Assessment & Plan    Blood pressure is poorly controlled, I will continue her home hydrochlorothiazide but I will place her on as needed hydralazine and depending on her breakthrough need, we will adjust her home medications.         Prophylaxis: Sequential compression devices for DVT prophylaxis, no PPI indicated, bowel protocol as needed

## 2019-04-27 NOTE — PROGRESS NOTES
Pt arrived to unit via gurney at 0820. Pt oriented to room, unit, and plan of care. Tele-monitor placed, SR, 67; Pt c/o 6/10 CP that radiate to back; Medicated per MAR; Pt notified RN that armpit lymph nodes swell and are painful when premenstrual, and right leg is weeping due to significant swelling r/t lymphodema; MD updated Admit profile and assessment completed; All questions answered at this time. Call light within reach; fall precautions in place.

## 2019-04-27 NOTE — DISCHARGE SUMMARY
Discharge Summary    CHIEF COMPLAINT ON ADMISSION  Chief Complaint   Patient presents with   • Chest Pain   • Numbness     left arm        Reason for Admission  Chest Pain     Admission Date  4/27/2019    CODE STATUS  Full Code    HPI & HOSPITAL COURSE  This is a 53 y.o. female here with chest pain.  Please refer to H&P for detail.  She has had cardiac work-up done including stress test and was normal.  Due to her morbid obesity dialysis education was given.  She was also found to have hypertensive urgency with systolic blood pressure over 200 initially in ER.  She was started on lisinopril, amlodipine  With as needed IV medications.  She was educated to check her blood pressure at home 3 times a day and follow-up with PCP as an outpatient.  I saw and examined the patient today.       Therefore, she is discharged in fair and stable condition to home with close outpatient follow-up.        Discharge Date  4/27/19    FOLLOW UP ITEMS POST DISCHARGE      DISCHARGE DIAGNOSES  Principal Problem:    Chest pain POA: Yes  Active Problems:    HTN (hypertension) POA: Unknown    Depression POA: Unknown  Resolved Problems:    * No resolved hospital problems. *      FOLLOW UP  No future appointments.  PCP in 1 week            MEDICATIONS ON DISCHARGE     Medication List      START taking these medications      Instructions   amLODIPine 10 MG Tabs  Start taking on:  4/28/2019  Commonly known as:  NORVASC   Take 1 Tab by mouth every day.  Dose:  10 mg     lisinopril 20 MG Tabs  Start taking on:  4/28/2019  Commonly known as:  PRINIVIL   Take 1 Tab by mouth every day.  Dose:  20 mg        CONTINUE taking these medications      Instructions   cetirizine 10 MG Tabs  Commonly known as:  ZYRTEC   Take 10 mg by mouth every day.  Dose:  10 mg     fish oil 1000 MG Caps capsule   Take 1,000 mg by mouth every day.  Dose:  1000 mg     fluticasone 50 MCG/ACT nasal spray  Commonly known as:  FLONASE   Spray 1 Spray in nose every day.  Dose:  1  Spray     GLUCOSAMINE PO   Take 1 Tab by mouth every day.  Dose:  1 Tab     MULTI VITAMIN PO   Take 1 Tab by mouth every day.  Dose:  1 Tab     raNITidine 150 MG Tabs  Commonly known as:  ZANTAC   Take 150 mg by mouth every day.  Dose:  150 mg     VITAMIN B COMPLEX PO   Take 1 Tab by mouth every day.  Dose:  1 Tab     VITAMIN C PO   Take 1 Tab by mouth every day.  Dose:  1 Tab     VITAMIN D PO   Take 1 Tab by mouth every day.  Dose:  1 Tab     VITAMIN E PO   Take 1 Tab by mouth every day.  Dose:  1 Tab            Allergies  No Known Allergies    DIET  No orders of the defined types were placed in this encounter.      ACTIVITY  As tolerated.  Weight bearing as tolerated    CONSULTATIONS      PROCEDURES      LABORATORY  Lab Results   Component Value Date    SODIUM 136 04/27/2019    POTASSIUM 3.8 04/27/2019    CHLORIDE 102 04/27/2019    CO2 27 04/27/2019    GLUCOSE 96 04/27/2019    BUN 10 04/27/2019    CREATININE 0.62 04/27/2019        Lab Results   Component Value Date    WBC 8.3 04/27/2019    HEMOGLOBIN 13.4 04/27/2019    HEMATOCRIT 41.6 04/27/2019    PLATELETCT 305 04/27/2019        Total time of the discharge process exceeds 56 minutes.

## 2019-04-27 NOTE — ED TRIAGE NOTES
Pt to triage with c/o chest pains/ left arm numbness this evening , denies trauma/fever/nausea, aox4, resp even/unlabored, states chest pains increased with movement, ekg complete

## 2019-04-27 NOTE — DISCHARGE INSTRUCTIONS
Discharge Instructions    Discharged to home by car with relative. Discharged via wheelchair, hospital escort: Yes.  Special equipment needed: Not Applicable    Be sure to schedule a follow-up appointment with your primary care doctor or any specialists as instructed.     Discharge Plan:   Diet Plan: Discussed  Activity Level: Discussed  Confirmed Follow up Appointment: Patient to Call and Schedule Appointment  Confirmed Symptoms Management: Discussed  Medication Reconciliation Updated: Yes  Influenza Vaccine Indication: Indicated: 9 to 64 years of age    I understand that a diet low in cholesterol, fat, and sodium is recommended for good health. Unless I have been given specific instructions below for another diet, I accept this instruction as my diet prescription.   Other diet: Heart healthy     Special Instructions: None    · Is patient discharged on Warfarin / Coumadin?   No     Depression / Suicide Risk    As you are discharged from this [facility], it is important to learn how to keep safe from harming yourself.    Recognize the warning signs:  · Abrupt changes in personality, positive or negative- including increase in energy   · Giving away possessions  · Change in eating patterns- significant weight changes-  positive or negative  · Change in sleeping patterns- unable to sleep or sleeping all the time   · Unwillingness or inability to communicate  · Depression  · Unusual sadness, discouragement and loneliness  · Talk of wanting to die  · Neglect of personal appearance   · Rebelliousness- reckless behavior  · Withdrawal from people/activities they love  · Confusion- inability to concentrate     If you or a loved one observes any of these behaviors or has concerns about self-harm, here's what you can do:  · Talk about it- your feelings and reasons for harming yourself  · Remove any means that you might use to hurt yourself (examples: pills, rope, extension cords, firearm)  · Get professional help  from the community (Mental Health, Substance Abuse, psychological counseling)  · Do not be alone:Call your Safe Contact- someone whom you trust who will be there for you.  · Call your local CRISIS HOTLINE 309-0682 or 130-537-2544  · Call your local Children's Mobile Crisis Response Team [place] (810) 108-5126 or www.Watchfinder  · Call the toll free National Suicide Prevention Hotlines   · National Suicide Prevention Lifeline 159-999-YXAC (5615)  · [place] Line Network 800-SUICIDE (127-7882)    Chest Pain Observation  It is often hard to give a specific diagnosis for the cause of chest pain. Among other possibilities your symptoms might be caused by inadequate oxygen delivery to your heart (angina). Angina that is not treated or evaluated can lead to a heart attack (myocardial infarction) or death.  Blood tests, electrocardiograms, and X-rays may have been done to help determine a possible cause of your chest pain. After evaluation and observation, your health care provider has determined that it is unlikely your pain was caused by an unstable condition that requires hospitalization. However, a full evaluation of your pain may need to be completed, with additional diagnostic testing as directed. It is very important to keep your follow-up appointments. Not keeping your follow-up appointments could result in permanent heart damage, disability, or death. If there is any problem keeping your follow-up appointments, you must call your health care provider.  HOME CARE INSTRUCTIONS   Due to the slight chance that your pain could be angina, it is important to follow your health care provider's treatment plan and also maintain a healthy lifestyle:  · Maintain or work toward achieving a healthy weight.  · Stay physically active and exercise regularly.  · Decrease your salt intake.  · Eat a balanced, healthy diet. Talk to a dietitian to learn about heart-healthy foods.  · Increase your fiber intake by  including whole grains, vegetables, fruits, and nuts in your diet.  · Avoid situations that cause stress, anger, or depression.  · Take medicines as advised by your health care provider. Report any side effects to your health care provider. Do not stop medicines or adjust the dosages on your own.  · Quit smoking. Do not use nicotine patches or gum until you check with your health care provider.  · Keep your blood pressure, blood sugar, and cholesterol levels within normal limits.  · Limit alcohol intake to no more than 1 drink per day for women who are not pregnant and 2 drinks per day for men.  · Do not abuse drugs.  SEEK IMMEDIATE MEDICAL CARE IF:  You have severe chest pain or pressure which may include symptoms such as:  · You feel pain or pressure in your arms, neck, jaw, or back.  · You have severe back or abdominal pain, feel sick to your stomach (nauseous), or throw up (vomit).  · You are sweating profusely.  · You are having a fast or irregular heartbeat.  · You feel short of breath while at rest.  · You notice increasing shortness of breath during rest, sleep, or with activity.  · You have chest pain that does not get better after rest or after taking your usual medicine.  · You wake from sleep with chest pain.  · You are unable to sleep because you cannot breathe.  · You develop a frequent cough or you are coughing up blood.  · You feel dizzy, faint, or experience extreme fatigue.  · You develop severe weakness, dizziness, fainting, or chills.  Any of these symptoms may represent a serious problem that is an emergency. Do not wait to see if the symptoms will go away. Call your local emergency services (911 in the [place] Do not drive yourself to the hospital.  MAKE SURE YOU:  · Understand these instructions.  · Will watch your condition.  · Will get help right away if you are not doing well or get worse.     This information is not intended to replace advice given to you by your health care provider. Make  sure you discuss any questions you have with your health care provider.     Document Released: 01/20/2012 Document Revised: 12/23/2014 Document Reviewed: 06/19/2014  [employer] Interactive Patient Education ©2016 [employer]        Hypertension  Hypertension, commonly called high blood pressure, is when the force of blood pumping through your arteries is too strong. Your arteries are the blood vessels that carry blood from your heart throughout your body. A blood pressure reading consists of a higher number over a lower number, such as 110/72. The higher number (systolic) is the pressure inside your arteries when your heart pumps. The lower number (diastolic) is the pressure inside your arteries when your heart relaxes. Ideally you want your blood pressure below 120/80.  Hypertension forces your heart to work harder to pump blood. Your arteries may become narrow or stiff. Having untreated or uncontrolled hypertension can cause heart attack, stroke, kidney disease, and other problems.  What increases the risk?  Some risk factors for high blood pressure are controllable. Others are not.  Risk factors you cannot control include:  · Race. You may be at higher risk if you are .  · Age. Risk increases with age.  · Gender. Men are at higher risk than women before age 45 years. After age 65, women are at higher risk than men.  Risk factors you can control include:  · Not getting enough exercise or physical activity.  · Being overweight.  · Getting too much fat, sugar, calories, or salt in your diet.  · Drinking too much alcohol.  What are the signs or symptoms?  Hypertension does not usually cause signs or symptoms. Extremely high blood pressure (hypertensive crisis) may cause headache, anxiety, shortness of breath, and nosebleed.  How is this diagnosed?  To check if you have hypertension, your health care provider will measure your blood pressure while you are seated, with your arm held at the level of your  heart. It should be measured at least twice using the same arm. Certain conditions can cause a difference in blood pressure between your right and left arms. A blood pressure reading that is higher than normal on one occasion does not mean that you need treatment. If it is not clear whether you have high blood pressure, you may be asked to return on a different day to have your blood pressure checked again. Or, you may be asked to monitor your blood pressure at home for 1 or more weeks.  How is this treated?  Treating high blood pressure includes making lifestyle changes and possibly taking medicine. Living a healthy lifestyle can help lower high blood pressure. You may need to change some of your habits.  Lifestyle changes may include:  · Following the DASH diet. This diet is high in fruits, vegetables, and whole grains. It is low in salt, red meat, and added sugars.  · Keep your sodium intake below 2,300 mg per day.  · Getting at least 30-45 minutes of aerobic exercise at least 4 times per week.  · Losing weight if necessary.  · Not smoking.  · Limiting alcoholic beverages.  · Learning ways to reduce stress.  Your health care provider may prescribe medicine if lifestyle changes are not enough to get your blood pressure under control, and if one of the following is true:  · You are 18-59 years of age and your systolic blood pressure is above 140.  · You are 60 years of age or older, and your systolic blood pressure is above 150.  · Your diastolic blood pressure is above 90.  · You have diabetes, and your systolic blood pressure is over 140 or your diastolic blood pressure is over 90.  · You have kidney disease and your blood pressure is above 140/90.  · You have heart disease and your blood pressure is above 140/90.  Your personal target blood pressure may vary depending on your medical conditions, your age, and other factors.  Follow these instructions at home:  · Have your blood pressure rechecked as directed by  your health care provider.  · Take medicines only as directed by your health care provider. Follow the directions carefully. Blood pressure medicines must be taken as prescribed. The medicine does not work as well when you skip doses. Skipping doses also puts you at risk for problems.  · Do not smoke.  · Monitor your blood pressure at home as directed by your health care provider.  Contact a health care provider if:  · You think you are having a reaction to medicines taken.  · You have recurrent headaches or feel dizzy.  · You have swelling in your ankles.  · You have trouble with your vision.  Get help right away if:  · You develop a severe headache or confusion.  · You have unusual weakness, numbness, or feel faint.  · You have severe chest or abdominal pain.  · You vomit repeatedly.  · You have trouble breathing.  This information is not intended to replace advice given to you by your health care provider. Make sure you discuss any questions you have with your health care provider.  Document Released: 12/18/2006 Document Revised: 05/25/2017 Document Reviewed: 10/10/2014  [employer] Interactive Patient Education © 2017 [employer]

## 2019-04-28 NOTE — PROGRESS NOTES
Pt states personal belongings are in possession.  Pt escorted off unit by tech without incident.

## 2019-04-28 NOTE — PROGRESS NOTES
Pt dc'd home. IV and monitor removed; Personal belongings with pt when leaving unit. Pt given discharge instructions prior to leaving unit including prescription and when to visit with physician; verbalizes understanding. Copy of discharge instructions with pt and in the chart.  Awaiting transport from [facility].

## 2019-12-09 ENCOUNTER — HOSPITAL ENCOUNTER (OUTPATIENT)
Facility: MEDICAL CENTER | Age: 54
End: 2019-12-12
Attending: EMERGENCY MEDICINE | Admitting: HOSPITALIST
Payer: COMMERCIAL

## 2019-12-09 DIAGNOSIS — L02.415 CELLULITIS AND ABSCESS OF RIGHT LEG: ICD-10-CM

## 2019-12-09 DIAGNOSIS — L03.115 CELLULITIS AND ABSCESS OF RIGHT LEG: ICD-10-CM

## 2019-12-09 DIAGNOSIS — L02.91 ABSCESS: ICD-10-CM

## 2019-12-09 DIAGNOSIS — E66.01 MORBID OBESITY (HCC): ICD-10-CM

## 2019-12-09 PROBLEM — E87.6 HYPOKALEMIA: Status: ACTIVE | Noted: 2019-12-09

## 2019-12-09 PROBLEM — K21.9 GERD (GASTROESOPHAGEAL REFLUX DISEASE): Status: ACTIVE | Noted: 2019-12-09

## 2019-12-09 LAB
ALBUMIN SERPL BCP-MCNC: 4.2 G/DL (ref 3.2–4.9)
ALBUMIN/GLOB SERPL: 1.4 G/DL
ALP SERPL-CCNC: 71 U/L (ref 30–99)
ALT SERPL-CCNC: 17 U/L (ref 2–50)
ANION GAP SERPL CALC-SCNC: 9 MMOL/L (ref 0–11.9)
AST SERPL-CCNC: 19 U/L (ref 12–45)
BASOPHILS # BLD AUTO: 0.6 % (ref 0–1.8)
BASOPHILS # BLD: 0.06 K/UL (ref 0–0.12)
BILIRUB SERPL-MCNC: 0.4 MG/DL (ref 0.1–1.5)
BUN SERPL-MCNC: 9 MG/DL (ref 8–22)
CALCIUM SERPL-MCNC: 8.6 MG/DL (ref 8.5–10.5)
CHLORIDE SERPL-SCNC: 105 MMOL/L (ref 96–112)
CO2 SERPL-SCNC: 25 MMOL/L (ref 20–33)
CREAT SERPL-MCNC: 0.66 MG/DL (ref 0.5–1.4)
CRP SERPL HS-MCNC: 2.38 MG/DL (ref 0–0.75)
EOSINOPHIL # BLD AUTO: 0.46 K/UL (ref 0–0.51)
EOSINOPHIL NFR BLD: 4.5 % (ref 0–6.9)
ERYTHROCYTE [DISTWIDTH] IN BLOOD BY AUTOMATED COUNT: 50.3 FL (ref 35.9–50)
GLOBULIN SER CALC-MCNC: 3.1 G/DL (ref 1.9–3.5)
GLUCOSE SERPL-MCNC: 84 MG/DL (ref 65–99)
HCT VFR BLD AUTO: 39.2 % (ref 37–47)
HGB BLD-MCNC: 12.4 G/DL (ref 12–16)
IMM GRANULOCYTES # BLD AUTO: 0.18 K/UL (ref 0–0.11)
IMM GRANULOCYTES NFR BLD AUTO: 1.8 % (ref 0–0.9)
LYMPHOCYTES # BLD AUTO: 2.6 K/UL (ref 1–4.8)
LYMPHOCYTES NFR BLD: 25.5 % (ref 22–41)
MCH RBC QN AUTO: 28.5 PG (ref 27–33)
MCHC RBC AUTO-ENTMCNC: 31.6 G/DL (ref 33.6–35)
MCV RBC AUTO: 90.1 FL (ref 81.4–97.8)
MONOCYTES # BLD AUTO: 0.5 K/UL (ref 0–0.85)
MONOCYTES NFR BLD AUTO: 4.9 % (ref 0–13.4)
NEUTROPHILS # BLD AUTO: 6.41 K/UL (ref 2–7.15)
NEUTROPHILS NFR BLD: 62.7 % (ref 44–72)
NRBC # BLD AUTO: 0.03 K/UL
NRBC BLD-RTO: 0.3 /100 WBC
PLATELET # BLD AUTO: 358 K/UL (ref 164–446)
PMV BLD AUTO: 9.7 FL (ref 9–12.9)
POTASSIUM SERPL-SCNC: 3.5 MMOL/L (ref 3.6–5.5)
PROCALCITONIN SERPL-MCNC: 0.05 NG/ML
PROT SERPL-MCNC: 7.3 G/DL (ref 6–8.2)
RBC # BLD AUTO: 4.35 M/UL (ref 4.2–5.4)
SODIUM SERPL-SCNC: 139 MMOL/L (ref 135–145)
WBC # BLD AUTO: 10.2 K/UL (ref 4.8–10.8)

## 2019-12-09 PROCEDURE — 80053 COMPREHEN METABOLIC PANEL: CPT

## 2019-12-09 PROCEDURE — 99285 EMERGENCY DEPT VISIT HI MDM: CPT

## 2019-12-09 PROCEDURE — 86140 C-REACTIVE PROTEIN: CPT

## 2019-12-09 PROCEDURE — 700101 HCHG RX REV CODE 250: Performed by: EMERGENCY MEDICINE

## 2019-12-09 PROCEDURE — 87070 CULTURE OTHR SPECIMN AEROBIC: CPT

## 2019-12-09 PROCEDURE — 85025 COMPLETE CBC W/AUTO DIFF WBC: CPT

## 2019-12-09 PROCEDURE — 87205 SMEAR GRAM STAIN: CPT

## 2019-12-09 PROCEDURE — 99220 PR INITIAL OBSERVATION CARE,LEVL III: CPT | Performed by: HOSPITALIST

## 2019-12-09 PROCEDURE — 85652 RBC SED RATE AUTOMATED: CPT

## 2019-12-09 PROCEDURE — G0378 HOSPITAL OBSERVATION PER HR: HCPCS

## 2019-12-09 PROCEDURE — 84145 PROCALCITONIN (PCT): CPT

## 2019-12-09 PROCEDURE — 96365 THER/PROPH/DIAG IV INF INIT: CPT

## 2019-12-09 RX ORDER — AMLODIPINE BESYLATE 10 MG/1
10 TABLET ORAL DAILY
Status: ON HOLD | COMMUNITY
End: 2019-12-12

## 2019-12-09 RX ORDER — PROMETHAZINE HYDROCHLORIDE 25 MG/1
12.5-25 SUPPOSITORY RECTAL EVERY 4 HOURS PRN
Status: DISCONTINUED | OUTPATIENT
Start: 2019-12-09 | End: 2019-12-12 | Stop reason: HOSPADM

## 2019-12-09 RX ORDER — ONDANSETRON 2 MG/ML
4 INJECTION INTRAMUSCULAR; INTRAVENOUS EVERY 4 HOURS PRN
Status: DISCONTINUED | OUTPATIENT
Start: 2019-12-09 | End: 2019-12-12 | Stop reason: HOSPADM

## 2019-12-09 RX ORDER — CLINDAMYCIN PHOSPHATE 600 MG/50ML
600 INJECTION, SOLUTION INTRAVENOUS ONCE
Status: COMPLETED | OUTPATIENT
Start: 2019-12-09 | End: 2019-12-09

## 2019-12-09 RX ORDER — M-VIT,TX,IRON,MINS/CALC/FOLIC 27MG-0.4MG
1 TABLET ORAL DAILY
COMMUNITY
End: 2022-10-13

## 2019-12-09 RX ORDER — PROCHLORPERAZINE EDISYLATE 5 MG/ML
5-10 INJECTION INTRAMUSCULAR; INTRAVENOUS EVERY 4 HOURS PRN
Status: DISCONTINUED | OUTPATIENT
Start: 2019-12-09 | End: 2019-12-12 | Stop reason: HOSPADM

## 2019-12-09 RX ORDER — RANITIDINE 150 MG/1
150 TABLET ORAL DAILY
Status: DISCONTINUED | OUTPATIENT
Start: 2019-12-10 | End: 2019-12-09

## 2019-12-09 RX ORDER — LISINOPRIL 20 MG/1
20 TABLET ORAL DAILY
Status: DISCONTINUED | OUTPATIENT
Start: 2019-12-10 | End: 2019-12-12 | Stop reason: HOSPADM

## 2019-12-09 RX ORDER — HEPARIN SODIUM 5000 [USP'U]/ML
5000 INJECTION, SOLUTION INTRAVENOUS; SUBCUTANEOUS EVERY 8 HOURS
Status: DISCONTINUED | OUTPATIENT
Start: 2019-12-09 | End: 2019-12-12 | Stop reason: HOSPADM

## 2019-12-09 RX ORDER — BISACODYL 10 MG
10 SUPPOSITORY, RECTAL RECTAL
Status: DISCONTINUED | OUTPATIENT
Start: 2019-12-09 | End: 2019-12-12 | Stop reason: HOSPADM

## 2019-12-09 RX ORDER — FAMOTIDINE 20 MG/1
20 TABLET, FILM COATED ORAL DAILY
Status: DISCONTINUED | OUTPATIENT
Start: 2019-12-10 | End: 2019-12-12 | Stop reason: HOSPADM

## 2019-12-09 RX ORDER — ONDANSETRON 4 MG/1
4 TABLET, ORALLY DISINTEGRATING ORAL EVERY 4 HOURS PRN
Status: DISCONTINUED | OUTPATIENT
Start: 2019-12-09 | End: 2019-12-12 | Stop reason: HOSPADM

## 2019-12-09 RX ORDER — ASCORBIC ACID 500 MG
1000 TABLET ORAL DAILY
COMMUNITY
End: 2021-05-18

## 2019-12-09 RX ORDER — NAPROXEN SODIUM 220 MG
440 TABLET ORAL 2 TIMES DAILY WITH MEALS
Status: ON HOLD | COMMUNITY
End: 2019-12-12

## 2019-12-09 RX ORDER — B-COMPLEX WITH VITAMIN C
1 TABLET ORAL DAILY
COMMUNITY
End: 2022-10-13

## 2019-12-09 RX ORDER — MULTIVIT WITH MINERALS/LUTEIN
1 TABLET ORAL DAILY
COMMUNITY
End: 2019-12-28

## 2019-12-09 RX ORDER — POLYETHYLENE GLYCOL 3350 17 G/17G
1 POWDER, FOR SOLUTION ORAL
Status: DISCONTINUED | OUTPATIENT
Start: 2019-12-09 | End: 2019-12-12 | Stop reason: HOSPADM

## 2019-12-09 RX ORDER — LISINOPRIL 20 MG/1
20 TABLET ORAL DAILY
Status: DISCONTINUED | OUTPATIENT
Start: 2019-12-10 | End: 2019-12-09

## 2019-12-09 RX ORDER — LISINOPRIL 20 MG/1
20 TABLET ORAL DAILY
Status: ON HOLD | COMMUNITY
End: 2019-12-12

## 2019-12-09 RX ORDER — AMOXICILLIN 250 MG
2 CAPSULE ORAL 2 TIMES DAILY
Status: DISCONTINUED | OUTPATIENT
Start: 2019-12-09 | End: 2019-12-12 | Stop reason: HOSPADM

## 2019-12-09 RX ORDER — AMLODIPINE BESYLATE 10 MG/1
10 TABLET ORAL DAILY
Status: DISCONTINUED | OUTPATIENT
Start: 2019-12-10 | End: 2019-12-12 | Stop reason: HOSPADM

## 2019-12-09 RX ORDER — AMLODIPINE BESYLATE 5 MG/1
10 TABLET ORAL DAILY
Status: DISCONTINUED | OUTPATIENT
Start: 2019-12-10 | End: 2019-12-09

## 2019-12-09 RX ORDER — PROMETHAZINE HYDROCHLORIDE 25 MG/1
12.5-25 TABLET ORAL EVERY 4 HOURS PRN
Status: DISCONTINUED | OUTPATIENT
Start: 2019-12-09 | End: 2019-12-12 | Stop reason: HOSPADM

## 2019-12-09 RX ADMIN — CLINDAMYCIN IN 5 PERCENT DEXTROSE 600 MG: 12 INJECTION, SOLUTION INTRAVENOUS at 21:52

## 2019-12-09 ASSESSMENT — ENCOUNTER SYMPTOMS
DOUBLE VISION: 0
COUGH: 0
ABDOMINAL PAIN: 0
SENSORY CHANGE: 0
HEMOPTYSIS: 0
VOMITING: 0
DIZZINESS: 0
HALLUCINATIONS: 0
SHORTNESS OF BREATH: 0
WEAKNESS: 0
FOCAL WEAKNESS: 0
FEVER: 1
SPEECH CHANGE: 0
BLURRED VISION: 0
BRUISES/BLEEDS EASILY: 0
MYALGIAS: 1
EYE DISCHARGE: 0
CHILLS: 0
DEPRESSION: 0
PALPITATIONS: 0
HEARTBURN: 0
FLANK PAIN: 0
NAUSEA: 0

## 2019-12-09 ASSESSMENT — LIFESTYLE VARIABLES: SUBSTANCE_ABUSE: 0

## 2019-12-10 ENCOUNTER — APPOINTMENT (OUTPATIENT)
Dept: RADIOLOGY | Facility: MEDICAL CENTER | Age: 54
End: 2019-12-10
Attending: HOSPITALIST
Payer: COMMERCIAL

## 2019-12-10 LAB
ALBUMIN SERPL BCP-MCNC: 3.5 G/DL (ref 3.2–4.9)
ALBUMIN/GLOB SERPL: 1.5 G/DL
ALP SERPL-CCNC: 55 U/L (ref 30–99)
ALT SERPL-CCNC: 15 U/L (ref 2–50)
ANION GAP SERPL CALC-SCNC: 8 MMOL/L (ref 0–11.9)
AST SERPL-CCNC: 16 U/L (ref 12–45)
BASOPHILS # BLD AUTO: 0.7 % (ref 0–1.8)
BASOPHILS # BLD: 0.06 K/UL (ref 0–0.12)
BILIRUB SERPL-MCNC: 0.3 MG/DL (ref 0.1–1.5)
BUN SERPL-MCNC: 8 MG/DL (ref 8–22)
CALCIUM SERPL-MCNC: 7.9 MG/DL (ref 8.5–10.5)
CHLORIDE SERPL-SCNC: 108 MMOL/L (ref 96–112)
CO2 SERPL-SCNC: 24 MMOL/L (ref 20–33)
CREAT SERPL-MCNC: 0.59 MG/DL (ref 0.5–1.4)
EOSINOPHIL # BLD AUTO: 0.45 K/UL (ref 0–0.51)
EOSINOPHIL NFR BLD: 5.1 % (ref 0–6.9)
ERYTHROCYTE [DISTWIDTH] IN BLOOD BY AUTOMATED COUNT: 50.3 FL (ref 35.9–50)
ERYTHROCYTE [SEDIMENTATION RATE] IN BLOOD BY WESTERGREN METHOD: 83 MM/HOUR (ref 0–30)
GLOBULIN SER CALC-MCNC: 2.4 G/DL (ref 1.9–3.5)
GLUCOSE SERPL-MCNC: 100 MG/DL (ref 65–99)
GRAM STN SPEC: NORMAL
HCT VFR BLD AUTO: 34.6 % (ref 37–47)
HGB BLD-MCNC: 11 G/DL (ref 12–16)
IMM GRANULOCYTES # BLD AUTO: 0.12 K/UL (ref 0–0.11)
IMM GRANULOCYTES NFR BLD AUTO: 1.4 % (ref 0–0.9)
LYMPHOCYTES # BLD AUTO: 2.11 K/UL (ref 1–4.8)
LYMPHOCYTES NFR BLD: 24 % (ref 22–41)
MCH RBC QN AUTO: 28.7 PG (ref 27–33)
MCHC RBC AUTO-ENTMCNC: 31.8 G/DL (ref 33.6–35)
MCV RBC AUTO: 90.3 FL (ref 81.4–97.8)
MONOCYTES # BLD AUTO: 0.48 K/UL (ref 0–0.85)
MONOCYTES NFR BLD AUTO: 5.4 % (ref 0–13.4)
NEUTROPHILS # BLD AUTO: 5.59 K/UL (ref 2–7.15)
NEUTROPHILS NFR BLD: 63.4 % (ref 44–72)
NRBC # BLD AUTO: 0 K/UL
NRBC BLD-RTO: 0 /100 WBC
PLATELET # BLD AUTO: 321 K/UL (ref 164–446)
PMV BLD AUTO: 9.8 FL (ref 9–12.9)
POTASSIUM SERPL-SCNC: 3.3 MMOL/L (ref 3.6–5.5)
PROT SERPL-MCNC: 5.9 G/DL (ref 6–8.2)
RBC # BLD AUTO: 3.83 M/UL (ref 4.2–5.4)
SIGNIFICANT IND 70042: NORMAL
SITE SITE: NORMAL
SODIUM SERPL-SCNC: 140 MMOL/L (ref 135–145)
SOURCE SOURCE: NORMAL
WBC # BLD AUTO: 8.8 K/UL (ref 4.8–10.8)

## 2019-12-10 PROCEDURE — 700105 HCHG RX REV CODE 258

## 2019-12-10 PROCEDURE — 90471 IMMUNIZATION ADMIN: CPT

## 2019-12-10 PROCEDURE — A9270 NON-COVERED ITEM OR SERVICE: HCPCS | Performed by: HOSPITALIST

## 2019-12-10 PROCEDURE — 36415 COLL VENOUS BLD VENIPUNCTURE: CPT

## 2019-12-10 PROCEDURE — 96367 TX/PROPH/DG ADDL SEQ IV INF: CPT

## 2019-12-10 PROCEDURE — 700105 HCHG RX REV CODE 258: Performed by: HOSPITALIST

## 2019-12-10 PROCEDURE — 80053 COMPREHEN METABOLIC PANEL: CPT

## 2019-12-10 PROCEDURE — 90686 IIV4 VACC NO PRSV 0.5 ML IM: CPT | Performed by: HOSPITALIST

## 2019-12-10 PROCEDURE — 97165 OT EVAL LOW COMPLEX 30 MIN: CPT

## 2019-12-10 PROCEDURE — 85025 COMPLETE CBC W/AUTO DIFF WBC: CPT

## 2019-12-10 PROCEDURE — 99226 PR SUBSEQUENT OBSERVATION CARE,LEVEL III: CPT | Performed by: HOSPITALIST

## 2019-12-10 PROCEDURE — 97161 PT EVAL LOW COMPLEX 20 MIN: CPT

## 2019-12-10 PROCEDURE — G0378 HOSPITAL OBSERVATION PER HR: HCPCS

## 2019-12-10 PROCEDURE — 700102 HCHG RX REV CODE 250 W/ 637 OVERRIDE(OP): Performed by: HOSPITALIST

## 2019-12-10 PROCEDURE — 96366 THER/PROPH/DIAG IV INF ADDON: CPT

## 2019-12-10 PROCEDURE — 700111 HCHG RX REV CODE 636 W/ 250 OVERRIDE (IP): Performed by: HOSPITALIST

## 2019-12-10 PROCEDURE — 76882 US LMTD JT/FCL EVL NVASC XTR: CPT | Mod: RT

## 2019-12-10 PROCEDURE — 700101 HCHG RX REV CODE 250: Performed by: HOSPITALIST

## 2019-12-10 PROCEDURE — 96372 THER/PROPH/DIAG INJ SC/IM: CPT

## 2019-12-10 RX ORDER — DOXYCYCLINE 100 MG/1
100 TABLET ORAL EVERY 12 HOURS
Status: DISCONTINUED | OUTPATIENT
Start: 2019-12-10 | End: 2019-12-12 | Stop reason: HOSPADM

## 2019-12-10 RX ORDER — SODIUM CHLORIDE 9 MG/ML
INJECTION, SOLUTION INTRAVENOUS
Status: COMPLETED
Start: 2019-12-10 | End: 2019-12-10

## 2019-12-10 RX ORDER — POTASSIUM CHLORIDE 20 MEQ/1
40 TABLET, EXTENDED RELEASE ORAL ONCE
Status: COMPLETED | OUTPATIENT
Start: 2019-12-10 | End: 2019-12-10

## 2019-12-10 RX ADMIN — ASPIRIN 325 MG: 325 TABLET, COATED ORAL at 06:08

## 2019-12-10 RX ADMIN — AMLODIPINE BESYLATE 10 MG: 10 TABLET ORAL at 06:08

## 2019-12-10 RX ADMIN — HEPARIN SODIUM 5000 UNITS: 5000 INJECTION, SOLUTION INTRAVENOUS; SUBCUTANEOUS at 14:53

## 2019-12-10 RX ADMIN — INFLUENZA A VIRUS A/BRISBANE/02/2018 IVR-190 (H1N1) ANTIGEN (FORMALDEHYDE INACTIVATED), INFLUENZA A VIRUS A/KANSAS/14/2017 X-327 (H3N2) ANTIGEN (FORMALDEHYDE INACTIVATED), INFLUENZA B VIRUS B/PHUKET/3073/2013 ANTIGEN (FORMALDEHYDE INACTIVATED), AND INFLUENZA B VIRUS B/MARYLAND/15/2016 BX-69A ANTIGEN (FORMALDEHYDE INACTIVATED) 0.5 ML: 15; 15; 15; 15 INJECTION, SUSPENSION INTRAMUSCULAR at 06:09

## 2019-12-10 RX ADMIN — HEPARIN SODIUM 5000 UNITS: 5000 INJECTION, SOLUTION INTRAVENOUS; SUBCUTANEOUS at 22:18

## 2019-12-10 RX ADMIN — AMPICILLIN SODIUM AND SULBACTAM SODIUM 3 G: 2; 1 INJECTION, POWDER, FOR SOLUTION INTRAMUSCULAR; INTRAVENOUS at 12:39

## 2019-12-10 RX ADMIN — SODIUM CHLORIDE 500 ML: 9 INJECTION, SOLUTION INTRAVENOUS at 02:02

## 2019-12-10 RX ADMIN — HEPARIN SODIUM 5000 UNITS: 5000 INJECTION, SOLUTION INTRAVENOUS; SUBCUTANEOUS at 02:00

## 2019-12-10 RX ADMIN — AMPICILLIN SODIUM AND SULBACTAM SODIUM 3 G: 2; 1 INJECTION, POWDER, FOR SOLUTION INTRAMUSCULAR; INTRAVENOUS at 18:22

## 2019-12-10 RX ADMIN — DOXYCYCLINE 100 MG: 100 INJECTION, POWDER, LYOPHILIZED, FOR SOLUTION INTRAVENOUS at 00:32

## 2019-12-10 RX ADMIN — AMPICILLIN SODIUM AND SULBACTAM SODIUM 3 G: 2; 1 INJECTION, POWDER, FOR SOLUTION INTRAMUSCULAR; INTRAVENOUS at 02:02

## 2019-12-10 RX ADMIN — ASPIRIN 325 MG: 325 TABLET, COATED ORAL at 18:22

## 2019-12-10 RX ADMIN — LISINOPRIL 20 MG: 20 TABLET ORAL at 06:08

## 2019-12-10 RX ADMIN — DOXYCYCLINE 100 MG: 100 TABLET, FILM COATED ORAL at 18:57

## 2019-12-10 RX ADMIN — AMPICILLIN SODIUM AND SULBACTAM SODIUM 3 G: 2; 1 INJECTION, POWDER, FOR SOLUTION INTRAMUSCULAR; INTRAVENOUS at 06:03

## 2019-12-10 RX ADMIN — DOXYCYCLINE 100 MG: 100 INJECTION, POWDER, LYOPHILIZED, FOR SOLUTION INTRAVENOUS at 07:02

## 2019-12-10 RX ADMIN — POTASSIUM CHLORIDE 40 MEQ: 1500 TABLET, EXTENDED RELEASE ORAL at 09:40

## 2019-12-10 RX ADMIN — FAMOTIDINE 20 MG: 20 TABLET ORAL at 06:08

## 2019-12-10 ASSESSMENT — ENCOUNTER SYMPTOMS
FEVER: 0
DIARRHEA: 0
BACK PAIN: 0
COUGH: 0
WEIGHT LOSS: 0
NECK PAIN: 0
PALPITATIONS: 0
BLURRED VISION: 0
ORTHOPNEA: 0
MYALGIAS: 0
VOMITING: 0
CLAUDICATION: 0
SPUTUM PRODUCTION: 0
BRUISES/BLEEDS EASILY: 0
HEMOPTYSIS: 0
HEADACHES: 0
NAUSEA: 0
HEARTBURN: 0
ABDOMINAL PAIN: 0
DOUBLE VISION: 0
TINGLING: 0
CHILLS: 1
DIZZINESS: 0
DEPRESSION: 0
TREMORS: 0
SINUS PAIN: 0

## 2019-12-10 ASSESSMENT — COGNITIVE AND FUNCTIONAL STATUS - GENERAL
MOBILITY SCORE: 24
MOBILITY SCORE: 22
CLIMB 3 TO 5 STEPS WITH RAILING: A LITTLE
SUGGESTED CMS G CODE MODIFIER DAILY ACTIVITY: CJ
HELP NEEDED FOR BATHING: A LITTLE
DAILY ACTIVITIY SCORE: 22
SUGGESTED CMS G CODE MODIFIER MOBILITY: CJ
DRESSING REGULAR LOWER BODY CLOTHING: A LITTLE
STANDING UP FROM CHAIR USING ARMS: A LITTLE
SUGGESTED CMS G CODE MODIFIER MOBILITY: CH
DRESSING REGULAR LOWER BODY CLOTHING: A LITTLE
DAILY ACTIVITIY SCORE: 23
SUGGESTED CMS G CODE MODIFIER DAILY ACTIVITY: CI

## 2019-12-10 ASSESSMENT — LIFESTYLE VARIABLES
HOW MANY TIMES IN THE PAST YEAR HAVE YOU HAD 5 OR MORE DRINKS IN A DAY: 0
HAVE YOU EVER FELT YOU SHOULD CUT DOWN ON YOUR DRINKING: NO
TOTAL SCORE: 0
ALCOHOL_USE: YES
AVERAGE NUMBER OF DAYS PER WEEK YOU HAVE A DRINK CONTAINING ALCOHOL: 2
TOTAL SCORE: 0
EVER FELT BAD OR GUILTY ABOUT YOUR DRINKING: NO
ON A TYPICAL DAY WHEN YOU DRINK ALCOHOL HOW MANY DRINKS DO YOU HAVE: 2
CONSUMPTION TOTAL: NEGATIVE
DOES PATIENT WANT TO STOP DRINKING: NO
HAVE PEOPLE ANNOYED YOU BY CRITICIZING YOUR DRINKING: NO
EVER_SMOKED: NEVER
EVER HAD A DRINK FIRST THING IN THE MORNING TO STEADY YOUR NERVES TO GET RID OF A HANGOVER: NO
TOTAL SCORE: 0

## 2019-12-10 ASSESSMENT — PATIENT HEALTH QUESTIONNAIRE - PHQ9
2. FEELING DOWN, DEPRESSED, IRRITABLE, OR HOPELESS: NOT AT ALL
1. LITTLE INTEREST OR PLEASURE IN DOING THINGS: NOT AT ALL
SUM OF ALL RESPONSES TO PHQ9 QUESTIONS 1 AND 2: 0

## 2019-12-10 ASSESSMENT — COPD QUESTIONNAIRES
COPD SCREENING SCORE: 4
IN THE PAST 12 MONTHS DO YOU DO LESS THAN YOU USED TO BECAUSE OF YOUR BREATHING PROBLEMS: STRONGLY AGREE
DO YOU EVER COUGH UP ANY MUCUS OR PHLEGM?: NO/ONLY WITH OCCASIONAL COLDS OR INFECTIONS
HAVE YOU SMOKED AT LEAST 100 CIGARETTES IN YOUR ENTIRE LIFE: NO/DON'T KNOW
DURING THE PAST 4 WEEKS HOW MUCH DID YOU FEEL SHORT OF BREATH: SOME OF THE TIME

## 2019-12-10 ASSESSMENT — GAIT ASSESSMENTS
GAIT LEVEL OF ASSIST: SUPERVISED
DISTANCE (FEET): 300

## 2019-12-10 ASSESSMENT — ACTIVITIES OF DAILY LIVING (ADL): TOILETING: INDEPENDENT

## 2019-12-10 NOTE — ED NOTES
Med rec updated and complete. Allergies reviewed. Met with pt at bedside and dicussed current medications. Pt denies   Antibiotic use in last 14 days.    Home pharmacy [facility].

## 2019-12-10 NOTE — DIETARY
NUTRITION SERVICES: BMI - Pt with BMI >40 (=Body mass index is 60.46 kg/m².), morbid obesity. Weight loss counseling not appropriate in acute care setting.  Alert also received for newly identified wound. Wound team consult pending, will await wound staging to make recommendations if appropriate. RD will monitor per dept policy.    RECOMMEND - Referral to outpatient nutrition services for weight management after D/C.

## 2019-12-10 NOTE — H&P
Hospital Medicine History & Physical Note    Date of Service  12/9/2019    Primary Care Physician  Pcp Pt States None    Consultants  none    Code Status  full    Chief Complaint   lower extremity erythema     History of Presenting Illness  54 y.o. female who presented 12/9/2019 with past medical history of hypertension and chronic lymphedema who presents with right lower extremity erythema.  This patient's had progressively worsening erythema to the right lower extremity over the last 24 hours.  Is now she developed 3 open lesions with purulent drainage.  She is also had some subjective fevers and chills.  Pain in the right lower extremity worse with ambulation.  Throbbing in nature.  Otherwise no known alleviating or exacerbating factors to her symptoms.  She is found to have right lower extremity cellulitis with pus drainage and will be admitted to the hospital for IV antibiotics urine cultures.    Review of Systems  Review of Systems   Constitutional: Positive for fever and malaise/fatigue. Negative for chills.   HENT: Negative for congestion, hearing loss and tinnitus.    Eyes: Negative for blurred vision, double vision and discharge.   Respiratory: Negative for cough, hemoptysis and shortness of breath.    Cardiovascular: Negative for chest pain, palpitations and leg swelling.   Gastrointestinal: Negative for abdominal pain, heartburn, nausea and vomiting.   Genitourinary: Negative for dysuria and flank pain.   Musculoskeletal: Positive for myalgias. Negative for joint pain.   Skin: Positive for rash.   Neurological: Negative for dizziness, sensory change, speech change, focal weakness and weakness.   Endo/Heme/Allergies: Negative for environmental allergies. Does not bruise/bleed easily.   Psychiatric/Behavioral: Negative for depression, hallucinations and substance abuse.       Past Medical History   has a past medical history of HTN (hypertension) (4/27/2019) and Lymph edema. She also has no past medical  history of Asthma, Congestive heart failure (HCC), Liver disease, Stroke (HCC), or Type II or unspecified type diabetes mellitus without mention of complication, not stated as uncontrolled.    Surgical History  Reviewed and not pertinent     Family History  Reviewed and not pertinent     Social History   reports that she has never smoked. She has never used smokeless tobacco. She reports current alcohol use. She reports that she does not use drugs.    Allergies  No Known Allergies    Medications  Prior to Admission Medications   Prescriptions Last Dose Informant Patient Reported? Taking?   Ascorbic Acid (VITAMIN C PO)  Patient Yes No   Sig: Take 1 Tab by mouth every day.   B Complex Vitamins (VITAMIN B COMPLEX PO)  Patient Yes No   Sig: Take 1 Tab by mouth every day.   Cholecalciferol (VITAMIN D PO)  Patient Yes No   Sig: Take 1 Tab by mouth every day.   Glucosamine HCl (GLUCOSAMINE PO)  Patient Yes No   Sig: Take 1 Tab by mouth every day.   Multiple Vitamin (MULTI VITAMIN PO)  Patient Yes No   Sig: Take 1 Tab by mouth every day.   Omega-3 Fatty Acids (FISH OIL) 1000 MG Cap capsule  Patient Yes No   Sig: Take 1,000 mg by mouth every day.   VITAMIN E PO  Patient Yes No   Sig: Take 1 Tab by mouth every day.   amLODIPine (NORVASC) 10 MG Tab   No No   Sig: Take 1 Tab by mouth every day.   cetirizine (ZYRTEC) 10 MG TABS  Patient Yes No   Sig: Take 10 mg by mouth every day.   fluticasone (FLONASE) 50 MCG/ACT nasal spray  Patient Yes No   Sig: Spray 1 Spray in nose every day.   lisinopril (PRINIVIL) 20 MG Tab   No No   Sig: Take 1 Tab by mouth every day.   ranitidine (ZANTAC) 150 MG TABS  Patient Yes No   Sig: Take 150 mg by mouth every day.      Facility-Administered Medications: None       Physical Exam  Temp:  [36.3 °C (97.3 °F)] 36.3 °C (97.3 °F)  Pulse:  [79] 79  Resp:  [14] 14  BP: (160)/(90) 160/90  SpO2:  [97 %] 97 %    Physical Exam  Vitals signs reviewed.   Constitutional:       General: She is in acute distress.       Appearance: Normal appearance.   HENT:      Head: Normocephalic and atraumatic.      Nose: No congestion.   Eyes:      Extraocular Movements: Extraocular movements intact.      Pupils: Pupils are equal, round, and reactive to light.   Neck:      Musculoskeletal: Normal range of motion and neck supple. No muscular tenderness.   Cardiovascular:      Rate and Rhythm: Normal rate and regular rhythm.      Pulses: Normal pulses.      Heart sounds: Normal heart sounds. No murmur.   Pulmonary:      Effort: Pulmonary effort is normal. No respiratory distress.      Breath sounds: Normal breath sounds. No stridor.   Abdominal:      General: Bowel sounds are normal. There is no distension.      Palpations: Abdomen is soft.      Tenderness: There is no tenderness.   Musculoskeletal:         General: No swelling or deformity.   Skin:     General: Skin is warm and dry.      Capillary Refill: Capillary refill takes less than 2 seconds.      Findings: Erythema present.      Comments: Right lower extremity erythema, ttp and swelling noted. Pus drainage from 3 areas   Neurological:      General: No focal deficit present.      Mental Status: She is alert and oriented to person, place, and time.   Psychiatric:         Mood and Affect: Mood normal.         Behavior: Behavior normal.         Thought Content: Thought content normal.         Judgment: Judgment normal.         Laboratory:  Recent Labs     12/09/19  2155   WBC 10.2   RBC 4.35   HEMOGLOBIN 12.4   HEMATOCRIT 39.2   MCV 90.1   MCH 28.5   MCHC 31.6*   RDW 50.3*   PLATELETCT 358   MPV 9.7     Recent Labs     12/09/19  2155   SODIUM 139   POTASSIUM 3.5*   CHLORIDE 105   CO2 25   GLUCOSE 84   BUN 9   CREATININE 0.66   CALCIUM 8.6     Recent Labs     12/09/19  2155   ALTSGPT 17   ASTSGOT 19   ALKPHOSPHAT 71   TBILIRUBIN 0.4   GLUCOSE 84         No results for input(s): NTPROBNP in the last 72 hours.      No results for input(s): TROPONINT in the last 72 hours.    Urinalysis:     No results found     Imaging:  No orders to display         Assessment/Plan:  I anticipate this patient is appropriate for observation status at this time.    * Cellulitis and abscess of right leg  Assessment & Plan  Right leg cellulitis with small abscess noted, I&D done in ER   Drainage sent for cultures   IV unasyn and doxy for now   Follow cultures  Esr/cpr sent   descialte therapy as appropriate     GERD (gastroesophageal reflux disease)  Assessment & Plan  Resume home famotidine     Morbid obesity (HCC)  Assessment & Plan  Encouraged weight loss    Hypokalemia  Assessment & Plan  Replace and recheck labs    Depression- (present on admission)  Assessment & Plan  Resume home medications     HTN (hypertension)- (present on admission)  Assessment & Plan  Resume home ace and amlodipine       VTE prophylaxis: heparin

## 2019-12-10 NOTE — ED TRIAGE NOTES
Chief Complaint   Patient presents with   • Leg Swelling     Right leg swelling.  Redness of skin and hot to touch.  Multiple wounds with purulent drainage on right shin.  Triage process explained to patient.  Pt back to waiting room.  Pt instructed to inform RN if any changes or questions arise.

## 2019-12-10 NOTE — ASSESSMENT & PLAN NOTE
ultrasound right lower extremity negative for abscess formation.  Continue IV antibiotics, follow-up wound and blood cultures, wound care consulted.  If cultures remain negative will transition to p.o. antibiotics in the a.m. and probably discharge home

## 2019-12-10 NOTE — PROGRESS NOTES
Pt arrived on unit around 0130 via [facility] with transport.  Ambulated from [facility] to hospital bed with SBA.   AA&Ox4.   RA. Denies SOB.  Pain tolerable per pt.  Swelling noted to RLE. Red and warm to touch. No drainage noted to lesions.  Tolerating regular diet. Denies N/V.   + void.  LBM PTA.  Admit profile complete. Flu vaccine ordered.  Education provided regarding oral hygiene. Pt verbalized understanding.  Pt oriented to unit, room, and introduced to staff.  All needs met at this time. Call light within reach.

## 2019-12-10 NOTE — THERAPY
"Occupational Therapy Evaluation completed.   Functional Status: Pt is a 55yo female admitted for RLE cellulitis. Per chart pt is s/p I&D in ER, but according to RN and pt, she has not yet had I&D. PMHx of HTN and obesity. Completed ADLs/txfs with SPV and extra time for LB dressing. No AD req; educated on DME for home safety. Patient will not be actively followed for occupational therapy services at this time, however may be seen if requested by physician for 1 more visit within 30 days to address any discharge or equipment needs.     Plan of Care: Will benefit from Occupational Therapy for d/c needs only  Discharge Recommendations:  Equipment: Shower Chair. Post-acute therapy: Patient will not be actively followed for occupational therapy services at this time, however may be seen if requested by physician for 1 more visit within 30 days to address any discharge or equipment needs.       See \"Rehab Therapy-Acute\" Patient Summary Report for complete documentation.    "

## 2019-12-10 NOTE — PROGRESS NOTES
2 RN skin check complete.  Redness and swelling noted to RLE.  Wounds/lesions noted to right lower leg. No drainage noted.  Picture taken and uploaded to Epic.  LDA opened. Wound consult in place.

## 2019-12-10 NOTE — THERAPY
"Physical Therapy Evaluation completed.   Bed Mobility:  Supine to Sit: Supervised  Transfers: Sit to Stand: Supervised  Gait: Level Of Assist: Supervised with No Equipment Needed       Plan of Care: Patient with no further skilled PT needs in the acute care setting at this time  Discharge Recommendations: Equipment: No Equipment Needed. Post-acute therapy Currently anticipate no further skilled therapy needs once patient is discharged from the inpatient setting.    Pt presents with chronic R LE lymphedema and now cellulitis. Today, pt is supervised for OOB, supervised for transfers, supervised for ambulation x 300 feet using no AD and supervised on stairs. Pt lives with daughter, will have assist as needed. No further inpt PT needs. Pt should continue to walk in hallway with nsg supervision to maintain endurance.     See \"Rehab Therapy-Acute\" Patient Summary Report for complete documentation.     "

## 2019-12-10 NOTE — WOUND TEAM
"[facility]  Inpatient Services  Initial Wound and Skin Care Evaluation    Admission Date: 12/9/2019     Consult Date: 12/09/19    HPI, PMH, SH: Reviewed    Unit where seen by Wound Team: [id number]     WOUND CONSULT RELATED TO:  RLE     SUBJECTIVE:  \"I just elevate mainly.\"      Self Report / Pain Level:  No complaints of pain.        OBJECTIVE:  Patient getting into shower.  Supplies brought in and RN agreed to place when patient out of shower.      WOUND TYPE, LOCATION, CHARACTERISTICS (Pressure Injuries: location, stage, POA or date identified)    Wound 12/10/19 Full Thickness Wound Leg Right (Active)   Wound Image   12/10/2019  1:30 AM    4 crusted wound bed about same size.    Site Assessment Yellow    Patience-wound Assessment Edema    Margins Attached edges    Wound Length (cm) 2.5 cm    Wound Width (cm) 2.5 cm    Wound Surface Area (cm^2) 6.25 cm^2    Closure None    Drainage Amount None    Non-staged Wound Description Full thickness    Treatments Cleansed;Site care    Cleansing Approved Wound Cleanser    Periwound Protectant Skin Protectant wipes to Periwound    Dressing Options Hydrocolloid Thin    Dressing Cleansing/Solutions Not Applicable    Dressing Changed New    Dressing Status Clean;Dry;Intact    Dressing Change Frequency Every 48 hrs    NEXT Dressing Change  12/12/19    NEXT Weekly Photo (Inpatient Only) 12/11/19    WOUND NURSE ONLY - Odor None    WOUND NURSE ONLY - Pulses Right;1+;PT    WOUND NURSE ONLY - Exposed Structures None    WOUND NURSE ONLY - Tissue Type and Percentage 100% yellow crust    WOUND NURSE ONLY - Time Spent with Patient (mins) 60      Vascular:    Dorsal Pedal pulses:  palpable   Posterior tib pulses:   not assessed     BENJAMIN:      NA    Lab Values:    Lab Results   Component Value Date/Time    WBC 8.8 12/10/2019 03:24 AM    RBC 3.83 (L) 12/10/2019 03:24 AM    HEMOGLOBIN 11.0 (L) 12/10/2019 03:24 AM    HEMATOCRIT 34.6 (L) 12/10/2019 03:24 AM        No results found for: " HBA1C      Culture:   Right leg results pending.     INTERVENTIONS BY WOUND TEAM:  Area assessed.  Spoke to patient about swelling and presumed lymphedema.  Patient going to shower and RN agreed to place dressing.   Brought hydrocolloid thin and tubi  G to apply.  Patient agreeable to doing own wound care.      Dressing Selection:  Hydrocolloid thin, tubi  G         Interdisciplinary consultation: Patient, Bedside RN, anne-marie      EVALUATION: patient with chronic swelling and per patient lymphedema.  States she has wound that opens and closes but now has 4 areas.  Only does elevation to help with swelling.  States she has tried other compression stocking but she did not like them.  Can get tubi  online which will assist with mild compression.      Factors affecting wound healing: swelling, infection    Goals: Steady decrease in wound area and depth weekly.    NURSING PLAN OF CARE ORDERS (X):    Dressing changes: See Dressing Care orders: X  Skin care: See Skin Care orders: ensure patient is turning.   Rectal tube care: See Rectal Tube Care orders:   Other orders:    RSKIN: CURRENT (X) ORDERED (O):   Q shift Raulito:  X  Q shift pressure point assessments:  X  Pressure redistribution mattress            KATHIA          Bariatric KATHIA       X  Bariatric foam           Heel float boots      Heel silicone dressing      Float Heels off Bed with Pillows               Barrier wipes         Barrier Cream         Barrier paste          Sacral silicone dressing     Silicone O2 tubing         Anchorfast         Cannula fixation Device (Tender )          Gray Foam Ear protectors           Trach with Optifoam split foam                 Waffle cushion        Waffle Overlay         Rectal tube or BMS   Purwick/Condom Cath         Antifungal tx      Interdry          Reposition q 2 hours      Ensure patient is turning   Up to chair        Ambulate      PT/OT        Dietician        Diabetes Education      PO  X    TF     TPN     NPO   # days   Other        WOUND TEAM PLAN OF CARE (X):   NPWT change 3 x week:        Dressing changes by wound team:       Follow up as needed:     X  Other (explain):     Anticipated discharge plans (X):   SNF:           Home Care:           Outpatient Wound Center:            Self Care:          X no insurance   Other:

## 2019-12-10 NOTE — DISCHARGE PLANNING
Care Transition Team Assessment      Anticipated Discharge Disposition: Home    Action: RN CM assessed pt at bedside and verified facesheet demographics.  Pt reports she lives in a single story duplex with her daughter in [place].  She is independent with ADLs and IADLs and reports using no DME. Pt is employed, has prescription drug coverage, uses [facility], and reports no financial barriers at this time. Pt's PCP is Kiel Escamilla MD at the [facility]. Pt anticipates discharging home with no needs when medically cleared.     Barriers to Discharge: Medical clearance pending for discharge.     Plan: Await medical clearance for discharge home.     Information Source  Orientation : Oriented x 4  Information Given By: Patient  Who is responsible for making decisions for patient? : Patient    Readmission Evaluation  Is this a readmission?: No    Elopement Risk  Legal Hold: No  Ambulatory or Self Mobile in Wheelchair: Yes  Disoriented: No  Psychiatric Symptoms: None  History of Wandering: No  Elopement this Admit: No  Vocalizing Wanting to Leave: No  Displays Behaviors, Body Language Wanting to Leave: No-Not at Risk for Elopement  Elopement Risk: Not at Risk for Elopement    Interdisciplinary Discharge Planning  Does Admitting Nurse Feel This Could be a Complex Discharge?: No  Primary Care Physician: Kiel Escamilla  Lives with - Patient's Self Care Capacity: Adult Children  Patient or legal guardian wants to designate a caregiver (see row info): No  Support Systems: Family Member(s)  Housing / Facility: 1 Story Apartment / Condo  Do You Take your Prescribed Medications Regularly: Yes  Able to Return to Previous ADL's: Yes  Mobility Issues: Yes  Prior Services: Home-Independent  Patient Expects to be Discharged to:: Home  Assistance Needed: No  Durable Medical Equipment: Not Applicable    Discharge Preparedness  What is your plan after discharge?: Home with help  What are  your discharge supports?: Child  Prior Functional Level: Ambulatory, Independent with Activities of Daily Living, Drives Self, Independent with Medication Management  Difficulity with ADLs: None  Difficulity with IADLs: None    Functional Assesment  Prior Functional Level: Ambulatory, Independent with Activities of Daily Living, Drives Self, Independent with Medication Management    Finances  Financial Barriers to Discharge: No  Prescription Coverage: Yes    Vision / Hearing Impairment  Vision Impairment : Yes  Right Eye Vision: Impaired, Wears Glasses  Left Eye Vision: Impaired, Wears Glasses  Hearing Impairment : No              Domestic Abuse  Have you ever been the victim of abuse or violence?: No  Physical Abuse or Sexual Abuse: No  Verbal Abuse or Emotional Abuse: No  Possible Abuse Reported to:: Not Applicable         Discharge Risks or Barriers  Discharge risks or barriers?: No    Anticipated Discharge Information  Anticipated discharge disposition: Home  Discharge Address: [facility]  Discharge Contact Phone Number: [phone]

## 2019-12-10 NOTE — ED PROVIDER NOTES
ED Provider Note    CHIEF COMPLAINT  Chief Complaint   Patient presents with   • Leg Swelling       HPI  An Rainey is a 54 y.o. female who presents with erythema and drainage to the right lower extremity.  The patient states she has a known history of lymphedema to the right lower extremity from an unclear source.  She states over the last week she started developing erythema and over last 24 hours she is developed 3 open lesions with purulent drainage.  She states she had some fevers a week ago that she attributed to influenza.  She has not had any recent fevers nor any nausea or vomiting.  She had does have some localized discomfort to the right leg.  She does not abuse tobacco and she does not have diabetes.    REVIEW OF SYSTEMS  See HPI for further details. All other systems are negative.     PAST MEDICAL HISTORY  Past Medical History:   Diagnosis Date   • HTN (hypertension) 4/27/2019   • Lymph edema        FAMILY HISTORY  [unfilled]    SOCIAL HISTORY  Social History     Socioeconomic History   • Marital status:      Spouse name: Not on file   • Number of children: Not on file   • Years of education: Not on file   • Highest education level: Not on file   Occupational History   • Not on file   Social Needs   • Financial resource strain: Not on file   • Food insecurity:     Worry: Not on file     Inability: Not on file   • Transportation needs:     Medical: Not on file     Non-medical: Not on file   Tobacco Use   • Smoking status: Never Smoker   • Smokeless tobacco: Never Used   Substance and Sexual Activity   • Alcohol use: Yes     Comment: occ   • Drug use: No   • Sexual activity: Not on file   Lifestyle   • Physical activity:     Days per week: Not on file     Minutes per session: Not on file   • Stress: Not on file   Relationships   • Social connections:     Talks on phone: Not on file     Gets together: Not on file     Attends Zoroastrian service: Not on file     Active member of club or  "organization: Not on file     Attends meetings of clubs or organizations: Not on file     Relationship status: Not on file   • Intimate partner violence:     Fear of current or ex partner: Not on file     Emotionally abused: Not on file     Physically abused: Not on file     Forced sexual activity: Not on file   Other Topics Concern   • Not on file   Social History Narrative   • Not on file       SURGICAL HISTORY  No past surgical history on file.    CURRENT MEDICATIONS  Home Medications    **Home medications have not yet been reviewed for this encounter**         ALLERGIES  No Known Allergies    PHYSICAL EXAM  VITAL SIGNS: /90   Pulse 79   Temp 36.3 °C (97.3 °F) (Oral)   Resp 14   Ht 1.651 m (5' 5\")   Wt (!) 164.8 kg (363 lb 5.1 oz)   SpO2 97%   BMI 60.46 kg/m²       Constitutional: Well developed, Well nourished, No acute distress, Non-toxic appearance.   HENT: Normocephalic, Atraumatic, Bilateral external ears normal, Oropharynx moist, No oral exudates, Nose normal.   Eyes: PERRLA, EOMI, Conjunctiva normal, No discharge.   Neck: Normal range of motion, No tenderness, Supple, No stridor.   Lymphatic: No lymphadenopathy noted.   Cardiovascular: Normal heart rate, Normal rhythm, No murmurs, No rubs, No gallops.   Thorax & Lungs: Normal breath sounds, No respiratory distress, No wheezing, No chest tenderness.   Abdomen: Bowel sounds normal, Soft, No tenderness, No masses, No pulsatile masses.   Skin: Right lower extremity has erythematous and Mona Demi's, the patient does have open wounds to the anterior aspect of her right lower extremity with purulent drainage.  She also has significant induration  Back: No tenderness, No CVA tenderness.    Extremities: Intact distal pulses, right lower extremity edema, right lower extremity tenderness, No cyanosis, No clubbing.   Neurologic: Alert & oriented x 3, Normal motor function, Normal sensory function, No focal deficits noted.   Psychiatric: Affect normal, " Judgment normal, Mood normal.     COURSE & MEDICAL DECISION MAKING  Pertinent Labs & Imaging studies reviewed. (See chart for details)  This is a 54-year-old female who presents the emergency department with cellulitis and several superficial open abscesses to the right lower extremity.  The patient will be admitted for IV antibiotics.  She does not appear toxic.  Laboratory analysis is reassuring.  We did send down a specimen for Gram stain and culture from the purulent drainage.  The patient received clindamycin for IV antibiotics.  She does not appear septic.  FINAL IMPRESSION  1.  Cellulitis to the right lower extremity with 3 open abscesses  2.  Lymphedema to the right lower extremity      Disposition  The patient will be admitted in stable condition         Electronically signed by: Dwight Lo, 12/9/2019 9:07 PM

## 2019-12-10 NOTE — CARE PLAN
Problem: Communication  Goal: The ability to communicate needs accurately and effectively will improve  Outcome: PROGRESSING AS EXPECTED  Education provided on importance of using call light to alert staff of patient needs. Pt demonstrates understanding by using call light appropriately.      Problem: Infection  Goal: Will remain free from infection  Outcome: PROGRESSING AS EXPECTED   IV abx in use.

## 2019-12-11 ENCOUNTER — APPOINTMENT (OUTPATIENT)
Dept: RADIOLOGY | Facility: MEDICAL CENTER | Age: 54
End: 2019-12-11
Attending: HOSPITALIST
Payer: COMMERCIAL

## 2019-12-11 LAB
ANION GAP SERPL CALC-SCNC: 10 MMOL/L (ref 0–11.9)
BASOPHILS # BLD AUTO: 0.6 % (ref 0–1.8)
BASOPHILS # BLD: 0.04 K/UL (ref 0–0.12)
BUN SERPL-MCNC: 6 MG/DL (ref 8–22)
CALCIUM SERPL-MCNC: 8.2 MG/DL (ref 8.5–10.5)
CHLORIDE SERPL-SCNC: 107 MMOL/L (ref 96–112)
CO2 SERPL-SCNC: 25 MMOL/L (ref 20–33)
CREAT SERPL-MCNC: 0.68 MG/DL (ref 0.5–1.4)
EOSINOPHIL # BLD AUTO: 0.28 K/UL (ref 0–0.51)
EOSINOPHIL NFR BLD: 3.9 % (ref 0–6.9)
ERYTHROCYTE [DISTWIDTH] IN BLOOD BY AUTOMATED COUNT: 51.9 FL (ref 35.9–50)
GLUCOSE SERPL-MCNC: 92 MG/DL (ref 65–99)
HCT VFR BLD AUTO: 36 % (ref 37–47)
HGB BLD-MCNC: 11.2 G/DL (ref 12–16)
IMM GRANULOCYTES # BLD AUTO: 0.08 K/UL (ref 0–0.11)
IMM GRANULOCYTES NFR BLD AUTO: 1.1 % (ref 0–0.9)
LYMPHOCYTES # BLD AUTO: 2.01 K/UL (ref 1–4.8)
LYMPHOCYTES NFR BLD: 28 % (ref 22–41)
MCH RBC QN AUTO: 28.4 PG (ref 27–33)
MCHC RBC AUTO-ENTMCNC: 31.1 G/DL (ref 33.6–35)
MCV RBC AUTO: 91.1 FL (ref 81.4–97.8)
MONOCYTES # BLD AUTO: 0.53 K/UL (ref 0–0.85)
MONOCYTES NFR BLD AUTO: 7.4 % (ref 0–13.4)
NEUTROPHILS # BLD AUTO: 4.25 K/UL (ref 2–7.15)
NEUTROPHILS NFR BLD: 59 % (ref 44–72)
NRBC # BLD AUTO: 0 K/UL
NRBC BLD-RTO: 0 /100 WBC
PLATELET # BLD AUTO: 325 K/UL (ref 164–446)
PMV BLD AUTO: 10 FL (ref 9–12.9)
POTASSIUM SERPL-SCNC: 4.1 MMOL/L (ref 3.6–5.5)
RBC # BLD AUTO: 3.95 M/UL (ref 4.2–5.4)
SODIUM SERPL-SCNC: 142 MMOL/L (ref 135–145)
WBC # BLD AUTO: 7.2 K/UL (ref 4.8–10.8)

## 2019-12-11 PROCEDURE — A9270 NON-COVERED ITEM OR SERVICE: HCPCS | Performed by: HOSPITALIST

## 2019-12-11 PROCEDURE — 700102 HCHG RX REV CODE 250 W/ 637 OVERRIDE(OP): Performed by: HOSPITALIST

## 2019-12-11 PROCEDURE — 85025 COMPLETE CBC W/AUTO DIFF WBC: CPT

## 2019-12-11 PROCEDURE — 96372 THER/PROPH/DIAG INJ SC/IM: CPT

## 2019-12-11 PROCEDURE — 700111 HCHG RX REV CODE 636 W/ 250 OVERRIDE (IP): Performed by: HOSPITALIST

## 2019-12-11 PROCEDURE — 36415 COLL VENOUS BLD VENIPUNCTURE: CPT

## 2019-12-11 PROCEDURE — G0378 HOSPITAL OBSERVATION PER HR: HCPCS

## 2019-12-11 PROCEDURE — 96366 THER/PROPH/DIAG IV INF ADDON: CPT

## 2019-12-11 PROCEDURE — 700105 HCHG RX REV CODE 258: Performed by: HOSPITALIST

## 2019-12-11 PROCEDURE — 80048 BASIC METABOLIC PNL TOTAL CA: CPT

## 2019-12-11 PROCEDURE — 99225 PR SUBSEQUENT OBSERVATION CARE,LEVEL II: CPT | Performed by: HOSPITALIST

## 2019-12-11 RX ADMIN — DOXYCYCLINE 100 MG: 100 TABLET, FILM COATED ORAL at 17:13

## 2019-12-11 RX ADMIN — ASPIRIN 325 MG: 325 TABLET, COATED ORAL at 05:37

## 2019-12-11 RX ADMIN — DOXYCYCLINE 100 MG: 100 TABLET, FILM COATED ORAL at 05:37

## 2019-12-11 RX ADMIN — HEPARIN SODIUM 5000 UNITS: 5000 INJECTION, SOLUTION INTRAVENOUS; SUBCUTANEOUS at 14:50

## 2019-12-11 RX ADMIN — AMPICILLIN SODIUM AND SULBACTAM SODIUM 3 G: 2; 1 INJECTION, POWDER, FOR SOLUTION INTRAMUSCULAR; INTRAVENOUS at 22:00

## 2019-12-11 RX ADMIN — AMPICILLIN SODIUM AND SULBACTAM SODIUM 3 G: 2; 1 INJECTION, POWDER, FOR SOLUTION INTRAMUSCULAR; INTRAVENOUS at 00:46

## 2019-12-11 RX ADMIN — ASPIRIN 325 MG: 325 TABLET, COATED ORAL at 17:13

## 2019-12-11 RX ADMIN — AMLODIPINE BESYLATE 10 MG: 10 TABLET ORAL at 05:37

## 2019-12-11 RX ADMIN — AMPICILLIN SODIUM AND SULBACTAM SODIUM 3 G: 2; 1 INJECTION, POWDER, FOR SOLUTION INTRAMUSCULAR; INTRAVENOUS at 12:18

## 2019-12-11 RX ADMIN — HEPARIN SODIUM 5000 UNITS: 5000 INJECTION, SOLUTION INTRAVENOUS; SUBCUTANEOUS at 06:00

## 2019-12-11 RX ADMIN — AMPICILLIN SODIUM AND SULBACTAM SODIUM 3 G: 2; 1 INJECTION, POWDER, FOR SOLUTION INTRAMUSCULAR; INTRAVENOUS at 05:37

## 2019-12-11 RX ADMIN — FAMOTIDINE 20 MG: 20 TABLET ORAL at 05:37

## 2019-12-11 RX ADMIN — LISINOPRIL 20 MG: 20 TABLET ORAL at 05:37

## 2019-12-11 RX ADMIN — HEPARIN SODIUM 5000 UNITS: 5000 INJECTION, SOLUTION INTRAVENOUS; SUBCUTANEOUS at 21:59

## 2019-12-11 ASSESSMENT — ENCOUNTER SYMPTOMS
MYALGIAS: 0
CHILLS: 1
NAUSEA: 0
NECK PAIN: 0
VOMITING: 0
PALPITATIONS: 0
HEADACHES: 0
HEARTBURN: 0
FEVER: 0
PHOTOPHOBIA: 0
DIZZINESS: 0
HEMOPTYSIS: 0
BLURRED VISION: 0
ABDOMINAL PAIN: 0
DEPRESSION: 0
COUGH: 0
TINGLING: 0
BRUISES/BLEEDS EASILY: 0

## 2019-12-11 NOTE — PROGRESS NOTES
[facility] Medicine Daily Progress Note    Date of Service  12/11/2019    Chief Complaint  54 y.o. female admitted 12/9/2019 with right leg cellulitis with possible discharge (abscess)      Interval Problem Update  Patient is resting in bed, pain and swelling is improved redness is improved she is tolerating diet no fever no chills.    Consultants/Specialty  None    Code Status  Full code    Disposition  Home in a.m.    Review of Systems  Review of Systems   Constitutional: Positive for chills. Negative for fever.   HENT: Negative for congestion and nosebleeds.    Eyes: Negative for blurred vision and photophobia.   Respiratory: Negative for cough and hemoptysis.    Cardiovascular: Positive for leg swelling (Right more than left). Negative for chest pain and palpitations.   Gastrointestinal: Negative for abdominal pain, heartburn, nausea and vomiting.   Genitourinary: Negative for dysuria, hematuria and urgency.   Musculoskeletal: Negative for joint pain, myalgias and neck pain.   Skin: Negative for itching.   Neurological: Negative for dizziness, tingling and headaches.   Endo/Heme/Allergies: Does not bruise/bleed easily.   Psychiatric/Behavioral: Negative for depression and suicidal ideas.        Physical Exam  Temp:  [36.4 °C (97.5 °F)-37.4 °C (99.4 °F)] 36.4 °C (97.5 °F)  Pulse:  [69-83] 69  Resp:  [18-19] 18  BP: (130-137)/(67-80) 130/72  SpO2:  [92 %-96 %] 92 %    Physical Exam  Vitals signs and nursing note reviewed.   Constitutional:       Appearance: Normal appearance. She is obese.   HENT:      Head: Normocephalic.      Nose: Nose normal.   Eyes:      Extraocular Movements: Extraocular movements intact.      Pupils: Pupils are equal, round, and reactive to light.   Neck:      Musculoskeletal: Normal range of motion. No neck rigidity or muscular tenderness.   Cardiovascular:      Rate and Rhythm: Normal rate and regular rhythm.      Pulses: Normal pulses.      Heart sounds: No murmur.   Pulmonary:       Effort: Pulmonary effort is normal. No respiratory distress.      Breath sounds: No wheezing.   Abdominal:      General: Bowel sounds are normal. There is no distension.      Palpations: Abdomen is soft.      Tenderness: There is no tenderness. There is no guarding.   Musculoskeletal: Normal range of motion.      Right lower leg: Edema (Edema and erythema) present.      Left lower leg: Edema present.   Skin:     General: Skin is warm.      Coloration: Skin is not jaundiced.      Findings: No bruising.   Neurological:      General: No focal deficit present.      Mental Status: She is alert.      Motor: No weakness.   Psychiatric:         Mood and Affect: Mood normal.         Behavior: Behavior normal.         Fluids    Intake/Output Summary (Last 24 hours) at 12/11/2019 1432  Last data filed at 12/11/2019 1200  Gross per 24 hour   Intake 1160 ml   Output --   Net 1160 ml       Laboratory  Recent Labs     12/09/19  2155 12/10/19  0324 12/11/19  0411   WBC 10.2 8.8 7.2   RBC 4.35 3.83* 3.95*   HEMOGLOBIN 12.4 11.0* 11.2*   HEMATOCRIT 39.2 34.6* 36.0*   MCV 90.1 90.3 91.1   MCH 28.5 28.7 28.4   MCHC 31.6* 31.8* 31.1*   RDW 50.3* 50.3* 51.9*   PLATELETCT 358 321 325   MPV 9.7 9.8 10.0     Recent Labs     12/09/19  2155 12/10/19  0324 12/11/19  0411   SODIUM 139 140 142   POTASSIUM 3.5* 3.3* 4.1   CHLORIDE 105 108 107   CO2 25 24 25   GLUCOSE 84 100* 92   BUN 9 8 6*   CREATININE 0.66 0.59 0.68   CALCIUM 8.6 7.9* 8.2*                   Imaging  US-EXTREMITY NON VASCULAR UNILATERAL RIGHT   Final Result      1.  There is anterior right lower leg edema and/or cellulitis.      IR-US GUIDED PIV    (Results Pending)        Assessment/Plan  * Cellulitis and abscess of right leg  Assessment & Plan  ultrasound right lower extremity negative for abscess formation.  Continue IV antibiotics, follow-up wound and blood cultures, wound care consulted.  If cultures remain negative will transition to p.o. antibiotics in the a.m. and  probably discharge home    GERD (gastroesophageal reflux disease)  Assessment & Plan  Continue famotidine    Morbid obesity (HCC)  Assessment & Plan  Needs to lose weight    Hypokalemia  Assessment & Plan  Resolved    Depression- (present on admission)  Assessment & Plan  Chronic.    HTN (hypertension)- (present on admission)  Assessment & Plan  Continue amlodipine and lisinopril       VTE prophylaxis: Heparin    Case and plan of care discussed with nurse staff  Case and plan of care discussed during multidisciplinary rounds

## 2019-12-11 NOTE — PROGRESS NOTES
[facility] Medicine Daily Progress Note    Date of Service  12/10/2019    Chief Complaint  54 y.o. female admitted 12/9/2019 with right leg cellulitis with possible discharge (abscess)      Interval Problem Update  Patient is in bed, complains of right leg swelling and tenderness, denies any fever but has chills, malaise, no nausea no vomiting.  Will get ultrasound lower extremity (right-sided) to assess for possible abscess formation.    Consultants/Specialty  None    Code Status  Full code    Disposition  To be determined    Review of Systems  Review of Systems   Constitutional: Positive for chills. Negative for fever and weight loss.   HENT: Negative for congestion, nosebleeds and sinus pain.    Eyes: Negative for blurred vision and double vision.   Respiratory: Negative for cough, hemoptysis and sputum production.    Cardiovascular: Positive for leg swelling (Right more than left). Negative for chest pain, palpitations, orthopnea and claudication.   Gastrointestinal: Negative for abdominal pain, diarrhea, heartburn, nausea and vomiting.   Genitourinary: Negative for dysuria, frequency and urgency.   Musculoskeletal: Negative for back pain, myalgias and neck pain.   Skin: Negative for itching.   Neurological: Negative for dizziness, tingling, tremors and headaches.   Endo/Heme/Allergies: Does not bruise/bleed easily.   Psychiatric/Behavioral: Negative for depression and suicidal ideas.        Physical Exam  Temp:  [36.3 °C (97.3 °F)-36.5 °C (97.7 °F)] 36.5 °C (97.7 °F)  Pulse:  [65-79] 65  Resp:  [14-19] 19  BP: (114-160)/(69-90) 114/69  SpO2:  [94 %-98 %] 94 %    Physical Exam  Vitals signs and nursing note reviewed.   Constitutional:       Appearance: Normal appearance. She is obese.   HENT:      Head: Normocephalic and atraumatic.      Nose: Nose normal.   Eyes:      General:         Right eye: No discharge.         Left eye: No discharge.      Extraocular Movements: Extraocular movements intact.       Conjunctiva/sclera: Conjunctivae normal.      Pupils: Pupils are equal, round, and reactive to light.   Neck:      Musculoskeletal: Normal range of motion. No neck rigidity or muscular tenderness.   Cardiovascular:      Rate and Rhythm: Normal rate and regular rhythm.      Pulses: Normal pulses.      Heart sounds: No murmur.   Pulmonary:      Effort: Pulmonary effort is normal. No respiratory distress.      Breath sounds: No wheezing or rales.   Abdominal:      General: Bowel sounds are normal. There is no distension.      Palpations: Abdomen is soft.      Tenderness: There is no tenderness. There is no guarding.   Musculoskeletal: Normal range of motion.      Right lower leg: Edema (Edema and erythema) present.      Left lower leg: Edema present.   Skin:     General: Skin is warm.      Coloration: Skin is not jaundiced.      Findings: No bruising.   Neurological:      General: No focal deficit present.      Mental Status: She is alert.      Motor: No weakness.   Psychiatric:         Mood and Affect: Mood normal.         Behavior: Behavior normal.         Fluids    Intake/Output Summary (Last 24 hours) at 12/10/2019 1610  Last data filed at 12/10/2019 1300  Gross per 24 hour   Intake 240 ml   Output --   Net 240 ml       Laboratory  Recent Labs     12/09/19  2155 12/10/19  0324   WBC 10.2 8.8   RBC 4.35 3.83*   HEMOGLOBIN 12.4 11.0*   HEMATOCRIT 39.2 34.6*   MCV 90.1 90.3   MCH 28.5 28.7   MCHC 31.6* 31.8*   RDW 50.3* 50.3*   PLATELETCT 358 321   MPV 9.7 9.8     Recent Labs     12/09/19  2155 12/10/19  0324   SODIUM 139 140   POTASSIUM 3.5* 3.3*   CHLORIDE 105 108   CO2 25 24   GLUCOSE 84 100*   BUN 9 8   CREATININE 0.66 0.59   CALCIUM 8.6 7.9*                   Imaging  US-EXTREMITY NON VASCULAR UNILATERAL RIGHT    (Results Pending)        Assessment/Plan  * Cellulitis and abscess of right leg  Assessment & Plan  We will get ultrasound right lower extremity to look for abscess formation.  Continue IV antibiotics,  follow-up wound and blood cultures, wound care consulted.    GERD (gastroesophageal reflux disease)  Assessment & Plan  Continue famotidine    Morbid obesity (HCC)  Assessment & Plan  Needs to lose weight    Hypokalemia  Assessment & Plan  Still low, replacing    Depression- (present on admission)  Assessment & Plan  Chronic.    HTN (hypertension)- (present on admission)  Assessment & Plan  Continue amlodipine and lisinopril         VTE prophylaxis: Heparin    Case and plan of care discussed with nurse staff  Case and plan of care discussed during multidisciplinary rounds

## 2019-12-11 NOTE — CARE PLAN
Problem: Pain Management  Goal: Pain level will decrease to patient's comfort goal  Outcome: PROGRESSING AS EXPECTED   Patient denies pain, just complains of discomfort in legs.    Problem: Safety  Goal: Will remain free from injury  Outcome: PROGRESSING AS EXPECTED  Goal: Will remain free from falls  Outcome: PROGRESSING AS EXPECTED   Patient demonstrates steady gait and ability to ambulate independently. Reminded patient to call for assistance if needed.

## 2019-12-11 NOTE — PROGRESS NOTES
"Received report of patient at start of shift. Patient is AOx4, no complaints of pain. Assessment complete, on room air. Dressing in place to right lower leg - clean/dry/intact. IV access lost during 1200 administration of Unasyn. Attempted IV starts unsuccessful. Patient pending ultrasound guided IV.  Encouraged patient to ambulate outside of room, states \"maybe later.\" Call light within reach, safety precautions in place.  "

## 2019-12-12 ENCOUNTER — PATIENT OUTREACH (OUTPATIENT)
Dept: HEALTH INFORMATION MANAGEMENT | Facility: OTHER | Age: 54
End: 2019-12-12

## 2019-12-12 VITALS
RESPIRATION RATE: 16 BRPM | HEIGHT: 65 IN | WEIGHT: 293 LBS | BODY MASS INDEX: 48.82 KG/M2 | DIASTOLIC BLOOD PRESSURE: 75 MMHG | TEMPERATURE: 98.2 F | HEART RATE: 65 BPM | SYSTOLIC BLOOD PRESSURE: 126 MMHG | OXYGEN SATURATION: 94 %

## 2019-12-12 LAB
BACTERIA WND AEROBE CULT: NORMAL
GRAM STN SPEC: NORMAL
SIGNIFICANT IND 70042: NORMAL
SITE SITE: NORMAL
SOURCE SOURCE: NORMAL

## 2019-12-12 PROCEDURE — 700102 HCHG RX REV CODE 250 W/ 637 OVERRIDE(OP): Performed by: HOSPITALIST

## 2019-12-12 PROCEDURE — 700111 HCHG RX REV CODE 636 W/ 250 OVERRIDE (IP): Performed by: HOSPITALIST

## 2019-12-12 PROCEDURE — G0378 HOSPITAL OBSERVATION PER HR: HCPCS

## 2019-12-12 PROCEDURE — 97597 DBRDMT OPN WND 1ST 20 CM/<: CPT

## 2019-12-12 PROCEDURE — A9270 NON-COVERED ITEM OR SERVICE: HCPCS | Performed by: HOSPITALIST

## 2019-12-12 PROCEDURE — 700105 HCHG RX REV CODE 258: Performed by: HOSPITALIST

## 2019-12-12 PROCEDURE — 99217 PR OBSERVATION CARE DISCHARGE: CPT | Performed by: HOSPITALIST

## 2019-12-12 PROCEDURE — 96372 THER/PROPH/DIAG INJ SC/IM: CPT

## 2019-12-12 RX ORDER — DOXYCYCLINE 100 MG/1
100 TABLET ORAL EVERY 12 HOURS
Qty: 8 TAB | Refills: 0 | Status: SHIPPED | OUTPATIENT
Start: 2019-12-12 | End: 2019-12-16

## 2019-12-12 RX ORDER — FAMOTIDINE 20 MG/1
20 TABLET, FILM COATED ORAL DAILY
Qty: 30 TAB | Refills: 0 | Status: SHIPPED
Start: 2019-12-13 | End: 2020-02-04

## 2019-12-12 RX ORDER — LISINOPRIL 20 MG/1
20 TABLET ORAL DAILY
Qty: 30 TAB | Refills: 0 | Status: SHIPPED | OUTPATIENT
Start: 2019-12-12 | End: 2020-02-04 | Stop reason: SDUPTHER

## 2019-12-12 RX ORDER — ACETAMINOPHEN 325 MG/1
650 TABLET ORAL EVERY 6 HOURS PRN
Status: DISCONTINUED | OUTPATIENT
Start: 2019-12-12 | End: 2019-12-12 | Stop reason: HOSPADM

## 2019-12-12 RX ORDER — AMLODIPINE BESYLATE 10 MG/1
10 TABLET ORAL DAILY
Qty: 30 TAB | Refills: 0 | Status: SHIPPED | OUTPATIENT
Start: 2019-12-12 | End: 2020-02-04 | Stop reason: SDUPTHER

## 2019-12-12 RX ORDER — AMOXICILLIN AND CLAVULANATE POTASSIUM 875; 125 MG/1; MG/1
1 TABLET, FILM COATED ORAL 2 TIMES DAILY
Qty: 8 TAB | Refills: 0 | Status: SHIPPED | OUTPATIENT
Start: 2019-12-12 | End: 2019-12-16

## 2019-12-12 RX ADMIN — DOXYCYCLINE 100 MG: 100 TABLET, FILM COATED ORAL at 05:06

## 2019-12-12 RX ADMIN — FAMOTIDINE 20 MG: 20 TABLET ORAL at 05:06

## 2019-12-12 RX ADMIN — ACETAMINOPHEN 650 MG: 325 TABLET, FILM COATED ORAL at 01:13

## 2019-12-12 RX ADMIN — ASPIRIN 325 MG: 325 TABLET, COATED ORAL at 05:07

## 2019-12-12 RX ADMIN — AMLODIPINE BESYLATE 10 MG: 10 TABLET ORAL at 05:06

## 2019-12-12 RX ADMIN — AMPICILLIN SODIUM AND SULBACTAM SODIUM 3 G: 2; 1 INJECTION, POWDER, FOR SOLUTION INTRAMUSCULAR; INTRAVENOUS at 03:11

## 2019-12-12 RX ADMIN — HEPARIN SODIUM 5000 UNITS: 5000 INJECTION, SOLUTION INTRAVENOUS; SUBCUTANEOUS at 05:07

## 2019-12-12 RX ADMIN — SENNOSIDES AND DOCUSATE SODIUM 2 TABLET: 8.6; 5 TABLET ORAL at 05:06

## 2019-12-12 RX ADMIN — LISINOPRIL 20 MG: 20 TABLET ORAL at 05:06

## 2019-12-12 NOTE — DISCHARGE INSTRUCTIONS
Discharge Instructions    Discharged to home by car with relative. Discharged via wheelchair, hospital escort: Yes.  Special equipment needed: Not Applicable    Be sure to schedule a follow-up appointment with your primary care doctor or any specialists as instructed.     Discharge Plan:   Diet Plan: Discussed  Activity Level: Discussed  Confirmed Follow up Appointment: Patient to Call and Schedule Appointment  Confirmed Symptoms Management: Discussed  Medication Reconciliation Updated: Yes  Influenza Vaccine Indication: Indicated: 9 to 64 years of age  Influenza Vaccine Given - only chart on this line when given: Influenza Vaccine Given (See MAR)    I understand that a diet low in cholesterol, fat, and sodium is recommended for good health. Unless I have been given specific instructions below for another diet, I accept this instruction as my diet prescription.   Other diet: Regular diet as tolerated.    Special Instructions:     Discharge Instructions per Shaun Coleman M.D.    Needs follow up with primary care doctor  Needs follow up with wound clinic and lymphedema clinic.     DIET: healthy diet    ACTIVITY: as tolerated    DIAGNOSIS: cellulitis    Return to ER if symptoms return, fever or chills, swelling, redness, discharge.       · Is patient discharged on Warfarin / Coumadin?   No       Cellulitis, Adult  Introduction  Cellulitis is a skin infection. The infected area is usually red and sore. This condition occurs most often in the arms and lower legs. It is very important to get treated for this condition.  Follow these instructions at home:  · Take over-the-counter and prescription medicines only as told by your doctor.  · If you were prescribed an antibiotic medicine, take it as told by your doctor. Do not stop taking the antibiotic even if you start to feel better.  · Drink enough fluid to keep your pee (urine) clear or pale yellow.  · Do not touch or rub the infected area.  · Raise (elevate) the  infected area above the level of your heart while you are sitting or lying down.  · Place warm or cold wet cloths (warm or cold compresses) on the infected area. Do this as told by your doctor.  · Keep all follow-up visits as told by your doctor. This is important. These visits let your doctor make sure your infection is not getting worse.  Contact a doctor if:  · You have a fever.  · Your symptoms do not get better after 1-2 days of treatment.  · Your bone or joint under the infected area starts to hurt after the skin has healed.  · Your infection comes back. This can happen in the same area or another area.  · You have a swollen bump in the infected area.  · You have new symptoms.  · You feel ill and also have muscle aches and pains.  Get help right away if:  · Your symptoms get worse.  · You feel very sleepy.  · You throw up (vomit) or have watery poop (diarrhea) for a long time.  · There are red streaks coming from the infected area.  · Your red area gets larger.  · Your red area turns darker.  This information is not intended to replace advice given to you by your health care provider. Make sure you discuss any questions you have with your health care provider.  Document Released: 06/05/2009 Document Revised: 05/25/2017 Document Reviewed: 10/26/2016  © 2017 Elsevier      Lymphedema  Introduction  Lymphedema is swelling that is caused by the abnormal collection of lymph under the skin. Lymph is fluid from the tissues in your body that travels in the lymphatic system. This system is part of the immune system and includes lymph nodes and lymph vessels. The lymph vessels collect and carry the excess fluid, fats, proteins, and wastes from the tissues of the body to the bloodstream. This system also works to clean and remove bacteria and waste products from the body.  Lymphedema occurs when the lymphatic system is blocked. When the lymph vessels or lymph nodes are blocked or damaged, lymph does not drain properly,  causing an abnormal buildup of lymph. This leads to swelling in the arms or legs. Lymphedema cannot be cured by medicines, but various methods can be used to help reduce the swelling.  What are the causes?  There are two types of lymphedema. Primary lymphedema is caused by the absence or abnormality of the lymph vessel at birth. Secondary lymphedema is more common. It occurs when the lymph vessel is damaged or blocked. Common causes of lymph vessel blockage include:  · Skin infection, such as cellulitis.  · Infection by parasites (filariasis).  · Injury.  · Cancer.  · Radiation therapy.  · Formation of scar tissue.  · Surgery.  What are the signs or symptoms?  Symptoms of this condition include:  · Swelling of the arm or leg.  · A heavy or tight feeling in the arm or leg.  · Swelling of the feet, toes, or fingers. Shoes or rings may fit more tightly than before.  · Redness of the skin over the affected area.  · Limited movement of the affected limb.  · Sensitivity to touch or discomfort in the affected limb.  How is this diagnosed?  This condition may be diagnosed with:  · A physical exam.  · Medical history.  · Bioimpedance spectroscopy. In this test, painless electrical currents are used to measure fluid levels in your body.  · Imaging tests, such as:  ¨ Lymphoscintigraphy. In this test, a low dose of a radioactive substance is injected to trace the flow of lymph through the lymph vessels.  ¨ MRI.  ¨ CT scan.  ¨ Duplex ultrasound. This test uses sound waves to produce images of the vessels and the blood flow on a screen.  ¨ Lymphangiography. In this test, a contrast dye is injected into the lymph vessel to help show blockages.  How is this treated?  Treatment for this condition may depend on the cause. Treatment may include:  · Exercise. Certain exercises can help fluid move out of the affected limb.  · Massage. Gentle massage of the affected limb can help move the fluid out of the area.  · Compression. Various  methods may be used to apply pressure to the affected limb in order to reduce the swelling.  ¨ Wearing compression stockings or sleeves on the affected limb.  ¨ Bandaging the affected limb.  ¨ Using an external pump that is attached to a sleeve that alternates between applying pressure and releasing pressure.  · Surgery. This is usually only done for severe cases. For example, surgery may be done if you have trouble moving the limb or if the swelling does not get better with other treatments.  If an underlying condition is causing the lymphedema, treatment for that condition is needed. For example, antibiotic medicines may be used to treat an infection.  Follow these instructions at home:  Activities  · Exercise regularly as directed by your health care provider.  · Do not sit with your legs crossed.  · When possible, keep the affected limb raised (elevated) above the level of your heart.  · Avoid carrying things with an arm that is affected by lymphedema.  · Remember that the affected area is more likely to become injured or infected.  · Take these steps to help prevent infection:  ¨ Keep the affected area clean and dry.  ¨ Protect your skin from cuts. For example, you should use gloves while cooking or gardening. Do not walk barefoot. If you shave the affected area, use an electric razor.  General instructions  · Take medicines only as directed by your health care provider.  · Eat a healthy diet that includes a lot of fruits and vegetables.  · Do not wear tight clothes, shoes, or jewelry.  · Do not use heating pads over the affected area.  · Avoid having blood pressure checked on the affected limb.  · Keep all follow-up visits as directed by your health care provider. This is important.  Contact a health care provider if:  · You continue to have swelling in your limb.  · You have a fever.  · You have a cut that does not heal.  · You have redness or pain in the affected area.  · You have new swelling in your limb  that comes on suddenly.  · You develop purplish spots or sores (lesions) on your limb.  Get help right away if:  · You have a skin rash.  · You have chills or sweats.  · You have shortness of breath.  This information is not intended to replace advice given to you by your health care provider. Make sure you discuss any questions you have with your health care provider.  Document Released: 10/14/2008 Document Revised: 08/24/2017 Document Reviewed: 11/25/2015  © 2017 [employer]    Depression / Suicide Risk    As you are discharged from this [facility] facility, it is important to learn how to keep safe from harming yourself.    Recognize the warning signs:  · Abrupt changes in personality, positive or negative- including increase in energy   · Giving away possessions  · Change in eating patterns- significant weight changes-  positive or negative  · Change in sleeping patterns- unable to sleep or sleeping all the time   · Unwillingness or inability to communicate  · Depression  · Unusual sadness, discouragement and loneliness  · Talk of wanting to die  · Neglect of personal appearance   · Rebelliousness- reckless behavior  · Withdrawal from people/activities they love  · Confusion- inability to concentrate     If you or a loved one observes any of these behaviors or has concerns about self-harm, here's what you can do:  · Talk about it- your feelings and reasons for harming yourself  · Remove any means that you might use to hurt yourself (examples: pills, rope, extension cords, firearm)  · Get professional help from the community (Mental Health, Substance Abuse, psychological counseling)  · Do not be alone:Call your Safe Contact- someone whom you trust who will be there for you.  · Call your local CRISIS HOTLINE 827-1511 or 435-245-5455  · Call your local Children's Mobile Crisis Response Team [place] (936) 586-4159 or www.[employer]  · Call the toll free National Suicide Prevention Hotlines   · National  Suicide Prevention Lifeline 667-440-VCCU (4016)  · National [place] Line Network 800-SUICIDE (576-6677)

## 2019-12-12 NOTE — CARE PLAN
Problem: Pain Management  Goal: Pain level will decrease to patient's comfort goal  Outcome: PROGRESSING AS EXPECTED  On call MD contacted for Tylenol. Order received/administered/effective.     Problem: Infection  Goal: Will remain free from infection  Outcome: PROGRESSING AS EXPECTED  Monitoring labs. Patient tolerating IV meropenem without adverse side effects reported.

## 2019-12-12 NOTE — CARE PLAN
Problem: Infection  Goal: Will remain free from infection  Outcome: PROGRESSING AS EXPECTED  Note:   Antibiotics administered per MAR.  Handwashing/foam performed before and after patient care.      Problem: Knowledge Deficit  Goal: Knowledge of disease process/condition, treatment plan, diagnostic tests, and medications will improve  Outcome: PROGRESSING AS EXPECTED  Note:   Patient updated on plan of care. Per MD, patient to possibly discharge tomorrow. All questions addressed.

## 2019-12-12 NOTE — PROGRESS NOTES
"Received report from AM RN; assumed care. A&O x 4. VSS. 94% on RA. Mild SOB reported with ambulation, patient states that this is \"normal\" for her. Patient reported allergies bothersome. On call MD contacted for Tylenol for mild pain; see MAR. Order received/administered; effective. Patient requesting nasal saline spray, will address in AM with hospitalist. Abdomen obese, soft, non-tender. BUE/BLE edema noted. 3+ nonpitting/generalized edema noted. Dressings/tubal stockings in place. RLE hydrocolloid thin, no drainage noted to site. Patient reported bothersome calf region, redness noted. Ice pack applied. Patient up self to bathroom, steady gait noted. + void. + flatus. LBM 12/10/19. Patient requesting shower prior to dressing change in AM along with supplies for discharge. POC discussed. Questions answered. Bed locked/lowest position. Call light/personal belongings within reach. All needs met/patient sleeping at present time.   "

## 2019-12-12 NOTE — WOUND TEAM
[facility]  Inpatient Services  Initial Wound and Skin Care Evaluation    Admission Date: 12/9/2019     Consult Date: 12/09/19    HPI, PMH, SH: Reviewed    Unit where seen by Wound Team: [id number]     WOUND CONSULT RELATED TO:  RLE     SUBJECTIVE:  Pt states she likes the tubi G and will order from [employer].  Pt states she will be referred to [facility] for lymphedema.  Pt prepared to do dressing changes.  Pt not able to describe a history that would result in lymphedema of the right (no sx hx, no DVT hx)    Self Report / Pain Level:  No complaints of pain.        OBJECTIVE:  Patient getting into shower.  Returned to dress wounds     WOUND TYPE, LOCATION, CHARACTERISTICS (Pressure Injuries: location, stage, POA or date identified)        Wound 12/10/19 Full Thickness Wound Leg Right (Active)   Wound Image   12/10/2019  1:30 AM   Site Assessment Red 12/12/2019 11:00 AM   Patience-wound Assessment Edema;Scar tissue;Pink 12/12/2019 11:00 AM   Margins Attached edges 12/12/2019 11:00 AM   Wound Length (cm) 2.5 cm 12/10/2019  1:40 PM   Wound Width (cm) 2.5 cm 12/10/2019  1:40 PM   Wound Surface Area (cm^2) 6.25 cm^2 12/10/2019  1:40 PM   Closure None 12/10/2019  7:30 PM   Drainage Amount Scant 12/12/2019 11:00 AM   Drainage Description Serous 12/12/2019 11:00 AM   Non-staged Wound Description Full thickness 12/11/2019  9:50 PM   Treatments Site care;Cleansed 12/12/2019 11:00 AM   Cleansing Approved Wound Cleanser 12/12/2019 11:00 AM   Periwound Protectant Skin Protectant wipes to Periwound;Moisture Barrier 12/12/2019 11:00 AM   Dressing Options Hydrocolloid Thin 12/12/2019 11:00 AM   Dressing Cleansing/Solutions Not Applicable 12/11/2019  9:50 PM   Dressing Changed Changed 12/12/2019 11:00 AM   Dressing Status Clean;Dry;Intact 12/12/2019  8:54 AM   Dressing Change Frequency Every 48 hrs 12/12/2019 11:00 AM   NEXT Dressing Change  12/14/19 12/12/2019 11:00 AM   NEXT Weekly Photo (Inpatient Only) 12/19/19 12/12/2019  11:00 AM   WOUND NURSE ONLY - Odor None 12/12/2019 11:00 AM   WOUND NURSE ONLY - Pulses Right;1+;PT 12/10/2019  1:40 PM   WOUND NURSE ONLY - Exposed Structures None 12/12/2019 11:00 AM   WOUND NURSE ONLY - Tissue Type and Percentage 100% red 12/12/2019 11:00 AM   WOUND NURSE ONLY - Time Spent with Patient (mins) 60 12/10/2019  1:40 PM        Vascular:    Dorsal Pedal pulses:  palpable   Posterior tib pulses:   not assessed     BENJAMIN:      NA    Lab Values:    Lab Results   Component Value Date/Time    WBC 7.2 12/11/2019 04:11 AM    RBC 3.95 (L) 12/11/2019 04:11 AM    HEMOGLOBIN 11.2 (L) 12/11/2019 04:11 AM    HEMATOCRIT 36.0 (L) 12/11/2019 04:11 AM        No results found for: HBA1C      Culture:   Right leg results - mixed skin patrick    INTERVENTIONS BY WOUND TEAM:  Dressing removed, wound cleansed with wound cleanser.  Sharp debrided yellow slough with scalpel and tweezers <20 cm.  Placed hydrocolloid on right leg wounds and placed tubi G and moisturizer.  Pt given some supplies to last until she can be seen at the OP clinic at [facility] for lymphedema.  Pt understands the benefit of managing lymphedema to optimize tissue health and minimize risk of infection.  Discussed that tubi provided minimal compression and pt would benefit from increased compression but if tubi  is the only compression pt tolerated it is better than nothing.      Dressing Selection:  Hydrocolloid thin, tubi  G         Interdisciplinary consultation: Patient, Bedside RN,     EVALUATION: Wound beds much improved after treatment with colloid.  Wounds should resolve in the next couple of weeks if pt adheres to POC.       patient with chronic swelling and per patient lymphedema.  States she has wound that opens and closes but now has 4 areas.  Only does elevation to help with swelling.  States she has tried other compression stocking but she did not like them.  Can get tubi  online which will assist with mild compression.      Factors  affecting wound healing: swelling, infection    Goals: Steady decrease in wound area and depth weekly.    NURSING PLAN OF CARE ORDERS (X):    Dressing changes: See Dressing Care orders: X  Skin care: See Skin Care orders: ensure patient is turning.   Rectal tube care: See Rectal Tube Care orders:   Other orders:      WOUND TEAM PLAN OF CARE (X):   NPWT change 3 x week:        Dressing changes by wound team:       Follow up as needed:     X  Other (explain):     Anticipated discharge plans (X):   SNF:           Home Care:           Outpatient Wound Center:            Self Care:          X no insurance   Other:

## 2019-12-12 NOTE — PROGRESS NOTES
Assumed care of pt.  AAOx4.  Pt stating mild discomfort from pain this morning, declining interventions at this time.  RLE cellulitis with dressing in place.  Compression wraps on BLE, 3+ edema.  Regular diet in place, no c/o n/v.  LBM 12/11, + flatus, + void.  POC reviewed with pt.  Call light within reach, pt educated to call for assistance as needed.  Hourly rounding in place.

## 2019-12-12 NOTE — PROGRESS NOTES
Received order for discharge.  Discharge instructions reviewed with pt.  All questions answered.  PIV removed.  All belongings gathered.  Pt escorted off unit with staff to discharge home with daughter.

## 2019-12-13 NOTE — DISCHARGE SUMMARY
Discharge Summary    CHIEF COMPLAINT ON ADMISSION  Chief Complaint   Patient presents with   • Leg Swelling       Reason for Admission  Rash; Right Leg Pain     Admission Date  12/9/2019    CODE STATUS  Full code    HPI & HOSPITAL COURSE  Please see recent H&P for specific information, patient was admitted due to leg cellulitis possible abscess, patient was started on IV antibiotics, wound care was consulted, patient had ultrasound lower extremity that was negative for abscess, patient improved with IV antibiotics, cultures negative, swelling and redness is almost resolved, patient is alert oriented follows commands she feels much better she is able to tolerate diet she has been able to ambulate, she is going to be discharged home today she is recommended to follow-up with wound care and lymphedema clinic and also with her primary care physician, [facility] agree with it she will continue on oral antibiotics, all questions answered.       Therefore, she is discharged in good and stable condition to home with close outpatient follow-up.        Discharge Date  12/12/2019    FOLLOW UP ITEMS POST DISCHARGE  Primary care physician  Wound clinic  Lymphedema clinic    DISCHARGE DIAGNOSES  Principal Problem:    Cellulitis and abscess of right leg POA: Unknown  Active Problems:    HTN (hypertension) POA: Yes    Depression POA: Yes    Hypokalemia POA: Unknown    Morbid obesity (HCC) POA: Unknown    GERD (gastroesophageal reflux disease) POA: Unknown  Resolved Problems:    * No resolved hospital problems. *      FOLLOW UP  Future Appointments   Date Time Provider Department Center   12/24/2019  9:55 AM Jayshree Armstrong M.D. [facility] [place]   12/28/2019 10:00 AM Mildred Nuñez R.N. [address]     Primary Healthcare Provider       Follow-up appointment      MEDICATIONS ON DISCHARGE     Medication List      START taking these medications      Instructions   amoxicillin-clavulanate 875-125 MG  Tabs  Commonly known as:  AUGMENTIN   Take 1 Tab by mouth 2 times a day for 4 days.  Dose:  1 Tab     doxycycline monohydrate 100 MG tablet  Commonly known as:  ADOXA   Take 1 Tab by mouth every 12 hours for 4 days.  Dose:  100 mg     famotidine 20 MG Tabs  Start taking on:  December 13, 2019  Commonly known as:  PEPCID   Take 1 Tab by mouth every day.  Dose:  20 mg        CHANGE how you take these medications      Instructions   amLODIPine 10 MG Tabs  What changed:  Another medication with the same name was removed. Continue taking this medication, and follow the directions you see here.  Commonly known as:  NORVASC   Take 1 Tab by mouth every day.  Dose:  10 mg     lisinopril 20 MG Tabs  What changed:  Another medication with the same name was removed. Continue taking this medication, and follow the directions you see here.  Commonly known as:  PRINIVIL   Take 1 Tab by mouth every day.  Dose:  20 mg        CONTINUE taking these medications      Instructions   ascorbic acid 500 MG Tabs  Commonly known as:  ascorbic acid   Take 500 mg by mouth every day.  Dose:  500 mg     aspirin  MG Tbec  Commonly known as:  ECOTRIN   Take 325 mg by mouth 2 Times a Day.  Dose:  325 mg     cetirizine 10 MG Tabs  Commonly known as:  ZYRTEC   Take 10 mg by mouth every day.  Dose:  10 mg     fish oil 1000 MG Caps capsule   Take 1,000 mg by mouth every day.  Dose:  1,000 mg     fluticasone 50 MCG/ACT nasal spray  Commonly known as:  FLONASE   Spray 1 Spray in nose every day.  Dose:  1 Spray     therapeutic multivitamin-minerals Tabs   Take 1 Tab by mouth every day.  Dose:  1 Tab     Vitamin B Complex Tabs   Take 1 Tab by mouth every day.  Dose:  1 Tab     vitamin D 1000 UNIT Tabs  Commonly known as:  cholecalciferol   Take 1,000 Units by mouth every day.  Dose:  1,000 Units     vitamin E 1000 UNIT Caps  Commonly known as:  VITAMIN E   Take 1 Cap by mouth every day.  Dose:  1 Cap        STOP taking these medications    ALEVE 220  MG tablet  Generic drug:  naproxen            Allergies  No Known Allergies    DIET  Healthy diet    ACTIVITY  As tolerated.  Weight bearing as tolerated    CONSULTATIONS  None    PROCEDURES  None    LABORATORY  Lab Results   Component Value Date    SODIUM 142 12/11/2019    POTASSIUM 4.1 12/11/2019    CHLORIDE 107 12/11/2019    CO2 25 12/11/2019    GLUCOSE 92 12/11/2019    BUN 6 (L) 12/11/2019    CREATININE 0.68 12/11/2019        Lab Results   Component Value Date    WBC 7.2 12/11/2019    HEMOGLOBIN 11.2 (L) 12/11/2019    HEMATOCRIT 36.0 (L) 12/11/2019    PLATELETCT 325 12/11/2019        Total time of the discharge process exceeds 33 minutes.

## 2019-12-24 ENCOUNTER — OFFICE VISIT (OUTPATIENT)
Dept: MEDICAL GROUP | Facility: PHYSICIAN GROUP | Age: 54
End: 2019-12-24
Payer: COMMERCIAL

## 2019-12-24 VITALS
DIASTOLIC BLOOD PRESSURE: 88 MMHG | HEIGHT: 65 IN | BODY MASS INDEX: 48.82 KG/M2 | WEIGHT: 293 LBS | HEART RATE: 88 BPM | RESPIRATION RATE: 18 BRPM | TEMPERATURE: 98.6 F | SYSTOLIC BLOOD PRESSURE: 136 MMHG | OXYGEN SATURATION: 89 %

## 2019-12-24 DIAGNOSIS — L02.415 CELLULITIS AND ABSCESS OF RIGHT LEG: ICD-10-CM

## 2019-12-24 DIAGNOSIS — Z13.6 SCREENING FOR CARDIOVASCULAR CONDITION: ICD-10-CM

## 2019-12-24 DIAGNOSIS — Z12.12 SCREENING FOR COLORECTAL CANCER: ICD-10-CM

## 2019-12-24 DIAGNOSIS — L03.115 CELLULITIS AND ABSCESS OF RIGHT LEG: ICD-10-CM

## 2019-12-24 DIAGNOSIS — F33.41 RECURRENT MAJOR DEPRESSIVE DISORDER, IN PARTIAL REMISSION (HCC): ICD-10-CM

## 2019-12-24 DIAGNOSIS — Z12.11 SCREENING FOR COLORECTAL CANCER: ICD-10-CM

## 2019-12-24 DIAGNOSIS — Z12.31 ENCOUNTER FOR SCREENING MAMMOGRAM FOR BREAST CANCER: ICD-10-CM

## 2019-12-24 DIAGNOSIS — I10 ESSENTIAL HYPERTENSION: ICD-10-CM

## 2019-12-24 DIAGNOSIS — I89.0 LYMPHEDEMA: ICD-10-CM

## 2019-12-24 PROBLEM — E87.6 HYPOKALEMIA: Status: RESOLVED | Noted: 2019-12-09 | Resolved: 2019-12-24

## 2019-12-24 PROCEDURE — 99204 OFFICE O/P NEW MOD 45 MIN: CPT | Performed by: FAMILY MEDICINE

## 2019-12-24 RX ORDER — ESCITALOPRAM OXALATE 10 MG/1
10 TABLET ORAL DAILY
Qty: 90 TAB | Refills: 1 | Status: SHIPPED | OUTPATIENT
Start: 2019-12-24 | End: 2020-07-22

## 2019-12-24 ASSESSMENT — PATIENT HEALTH QUESTIONNAIRE - PHQ9
4. FEELING TIRED OR HAVING LITTLE ENERGY: SEVERAL DAYS
SUM OF ALL RESPONSES TO PHQ QUESTIONS 1-9: 6
6. FEELING BAD ABOUT YOURSELF - OR THAT YOU ARE A FAILURE OR HAVE LET YOURSELF OR YOUR FAMILY DOWN: SEVERAL DAYS
SUM OF ALL RESPONSES TO PHQ9 QUESTIONS 1 AND 2: 2
5. POOR APPETITE OR OVEREATING: SEVERAL DAYS
3. TROUBLE FALLING OR STAYING ASLEEP OR SLEEPING TOO MUCH: SEVERAL DAYS
2. FEELING DOWN, DEPRESSED, IRRITABLE, OR HOPELESS: SEVERAL DAYS
8. MOVING OR SPEAKING SO SLOWLY THAT OTHER PEOPLE COULD HAVE NOTICED. OR THE OPPOSITE, BEING SO FIGETY OR RESTLESS THAT YOU HAVE BEEN MOVING AROUND A LOT MORE THAN USUAL: NOT AT ALL
9. THOUGHTS THAT YOU WOULD BE BETTER OFF DEAD, OR OF HURTING YOURSELF: NOT AT ALL
7. TROUBLE CONCENTRATING ON THINGS, SUCH AS READING THE NEWSPAPER OR WATCHING TELEVISION: NOT AT ALL
1. LITTLE INTEREST OR PLEASURE IN DOING THINGS: SEVERAL DAYS

## 2019-12-24 NOTE — PROGRESS NOTES
cc: Low mood/stress      Subjective:     An Rainey is a 54 y.o. female presenting for the following:     Patient recently hospitalized with cellulitis of right leg. She has now completed her antibiotic. She is doing well and the pain is now improved.  She is wearing her compression stockings every day, all day except for when she is showering. She does have an appointment at the wound clinic later in the week.  No severe fatigue, fever/chills.  She has also been referred to the body shop for treatment on the lymphedema.    Patient is aware that her obesity may be contributing to this.  Her weight has been very up-and-down and she has a lot of stress in her life right now that she would like to talk about today.  She does live with her daughter and this has been very stressful as she fights often with her .  Also, the patient is finalizing her divorce this year.  She has been  from her ex- for the last year.  She does have a history of major depression and she does admit that this has recurred although she denies any feelings of hopelessness, suicidal or homicidal ideation.  She has been improved with SSRIs in the past.    Review of systems:  All others reviewed and are negative.       Current Outpatient Medications:   •  GARLIC PO, Take  by mouth., Disp: , Rfl:   •  Calcium-Magnesium-Vitamin D (CALCIUM 500 PO), Take  by mouth., Disp: , Rfl:   •  escitalopram (LEXAPRO) 10 MG Tab, Take 1 Tab by mouth every day., Disp: 90 Tab, Rfl: 1  •  amLODIPine (NORVASC) 10 MG Tab, Take 1 Tab by mouth every day., Disp: 30 Tab, Rfl: 0  •  lisinopril (PRINIVIL) 20 MG Tab, Take 1 Tab by mouth every day., Disp: 30 Tab, Rfl: 0  •  famotidine (PEPCID) 20 MG Tab, Take 1 Tab by mouth every day., Disp: 30 Tab, Rfl: 0  •  ascorbic acid (ASCORBIC ACID) 500 MG Tab, Take 500 mg by mouth every day., Disp: , Rfl:   •  B Complex Vitamins (VITAMIN B COMPLEX) Tab, Take 1 Tab by mouth every day., Disp: , Rfl:   •   "vitamin D (CHOLECALCIFEROL) 1000 UNIT Tab, Take 1,000 Units by mouth every day., Disp: , Rfl:   •  therapeutic multivitamin-minerals (THERAGRAN-M) Tab, Take 1 Tab by mouth every day., Disp: , Rfl:   •  vitamin E (VITAMIN E) 1000 UNIT Cap, Take 1 Cap by mouth every day., Disp: , Rfl:   •  aspirin EC (ECOTRIN) 325 MG Tablet Delayed Response, Take 325 mg by mouth 2 Times a Day., Disp: , Rfl:   •  fluticasone (FLONASE) 50 MCG/ACT nasal spray, Spray 1 Spray in nose every day., Disp: , Rfl:   •  Omega-3 Fatty Acids (FISH OIL) 1000 MG Cap capsule, Take 1,000 mg by mouth every day., Disp: , Rfl:   •  cetirizine (ZYRTEC) 10 MG TABS, Take 10 mg by mouth every day., Disp: , Rfl:     Allergies, past medical history, past surgical history, family history, social history reviewed and updated    Objective:     Vitals: /88 (BP Location: Right arm, Patient Position: Sitting, BP Cuff Size: Large adult)   Pulse 88   Temp 37 °C (98.6 °F) (Temporal)   Resp 18   Ht 1.651 m (5' 5\")   Wt (!) 157.9 kg (348 lb)   SpO2 89%   BMI 57.91 kg/m²   General: Alert, pleasant, NAD  HEENT: Normocephalic.   EOMI, no icterus or pallor.  Conjunctivae and lids normal. External ears and nose normal. Oropharynx non-erythematous, mucous membranes moist.    Neck supple.  No thyromegaly or masses palpated. No cervical or supraclavicular lymphadenopathy.  Heart: Regular rate and rhythm.  S1 and S2 normal.  No murmurs appreciated.  Respiratory: Normal respiratory effort.  Clear to auscultation bilaterally.  Abdomen: Non-distended, soft  Skin: Warm, dry, no rashes in exposed areas.  Musculoskeletal: Gait is normal.  Moves all extremities well.  Extremities: Pitting edema bilateral legs.  Ulcers on anterior right leg clean based, no surrounding erythema or induration.  Neurological:  CN2-12 grossly intact  Psych:  Affect is normal, judgement is good, grooming is appropriate.    Assessment/Plan:     An was seen today for [facility] " follow-up.    Diagnoses and all orders for this visit:    Essential hypertension: Patient has been taking amlodipine and lisinopril regularly.  Blood pressure not well controlled today but patient would like to focus on her mood today and revisit this issue when she is feeling less stressed.    Lymphedema/Cellulitis and abscess of right leg: Improving since hospitalization and patient does have appointment with wound care later in the week.  No signs of recurrence currently.    Screening for colorectal cancer  -     REFERRAL TO GI FOR COLONOSCOPY    Encounter for screening mammogram for breast cancer Denies breast pain or masses, breast skin changes, or nipple discharge.   -     MA-SCREENING MAMMO BILAT W/TOMOSYNTHESIS W/CAD; Future    Screening for cardiovascular condition  -     Lipid Profile; Future    Recurrent major depressive disorder, in partial remission (HCC): Patient has been improved with SSRIs in the past.  Although she does remember having very low libido with Zoloft.  Will trial citalopram instead.  Patient warned of common side effects and to discontinue immediately if any worsening of mood or agitation.  Patient declines referral to behavioral health today.  -     escitalopram (LEXAPRO) 10 MG Tab; Take 1 Tab by mouth every day.      Return in about 4 weeks (around 1/21/2020), or if symptoms worsen or fail to improve, for mood and HTN.

## 2019-12-28 ENCOUNTER — NON-PROVIDER VISIT (OUTPATIENT)
Dept: WOUND CARE | Facility: MEDICAL CENTER | Age: 54
End: 2019-12-28
Attending: HOSPITALIST
Payer: COMMERCIAL

## 2019-12-28 PROCEDURE — 99211 OFF/OP EST MAY X REQ PHY/QHP: CPT

## 2019-12-28 PROCEDURE — 97597 DBRDMT OPN WND 1ST 20 CM/<: CPT

## 2019-12-28 RX ORDER — CALCIUM CARBONATE 500(1250)
500 TABLET ORAL DAILY
Status: ON HOLD | COMMUNITY
End: 2021-03-18

## 2019-12-28 RX ORDER — VITAMIN E 268 MG
400 CAPSULE ORAL DAILY
Status: ON HOLD | COMMUNITY
End: 2021-03-18

## 2019-12-28 NOTE — CERTIFICATION
Non Provider Encounter- Lower Extremity Ulcer    HISTORY OF PRESENT ILLNESS  Wound History:  START OF CARE IN CLINIC: 12/28/2019  REFERRING PROVIDER: Shaun Coleman M.D.    WOUND ETIOLOGY: Cellulitis, lymphedema.  LOCATION: Right anterolateral LE    HISTORY:  Patient is a 54 year old female with a right anterolateral LE wound. She started noticing RLE erythema in the beginning of December, and on 12/8 she developed three RLE wounds. She was admitted at [facility] from 12/9/2019-12/12/2019 for cellulitis. An abscess was suspected, so a right LE ultrasound was performed on 12/10/2019 which was negative. She was given IV antibiotics while hospitalized, and was discharged on oral antibiotics which have been completed. Patient has lymphedema, and she states that she will schedule with a lymphedema clinic after January 1st 2020.    Pertinent Medical History: Lymphedema     TOBACCO USE: No    FALL RISK ASSESSMENT: Completed 12/28/2019, not a fall risk.    65 years or older     Fall within the last 2 years   Uses ambulatory devices  Loss of protective sensation in feet   Use of prostethic/orthotic    Presence of lower extremity/foot/toe amputation   Taking medication that increases risk (per facility policy)      Clinic doppler 12/28/2019: Right DP/PT biphasic        PAST MEDICAL HISTORY:   Past Medical History:   Diagnosis Date   • HTN (hypertension) 4/27/2019   • Lymph edema        PAST SURGICAL HISTORY: No past surgical history on file.     MEDICATIONS:   Current Outpatient Medications   Medication   • calcium carbonate (OS-SPIKE 500) 500 MG Tab   • vitamin e (VITAMIN E) 400 UNIT Cap   • GARLIC PO   • escitalopram (LEXAPRO) 10 MG Tab   • amLODIPine (NORVASC) 10 MG Tab   • lisinopril (PRINIVIL) 20 MG Tab   • famotidine (PEPCID) 20 MG Tab   • ascorbic acid (ASCORBIC ACID) 500 MG Tab   • B Complex Vitamins (VITAMIN B COMPLEX) Tab   • vitamin D (CHOLECALCIFEROL) 1000 UNIT Tab   • therapeutic multivitamin-minerals  (THERAGRAN-M) Tab   • aspirin EC (ECOTRIN) 325 MG Tablet Delayed Response   • fluticasone (FLONASE) 50 MCG/ACT nasal spray   • Omega-3 Fatty Acids (FISH OIL) 1000 MG Cap capsule   • cetirizine (ZYRTEC) 10 MG TABS     No current facility-administered medications for this visit.        ALLERGIES:  No Known Allergies      SOCIAL HISTORY:   Social History     Socioeconomic History   • Marital status:      Spouse name: Not on file   • Number of children: Not on file   • Years of education: Not on file   • Highest education level: Not on file   Occupational History   • Not on file   Social Needs   • Financial resource strain: Not on file   • Food insecurity:     Worry: Not on file     Inability: Not on file   • Transportation needs:     Medical: Not on file     Non-medical: Not on file   Tobacco Use   • Smoking status: Never Smoker   • Smokeless tobacco: Never Used   Substance and Sexual Activity   • Alcohol use: Yes     Comment: occ   • Drug use: No   • Sexual activity: Not on file   Lifestyle   • Physical activity:     Days per week: Not on file     Minutes per session: Not on file   • Stress: Not on file   Relationships   • Social connections:     Talks on phone: Not on file     Gets together: Not on file     Attends Restorationism service: Not on file     Active member of club or organization: Not on file     Attends meetings of clubs or organizations: Not on file     Relationship status: Not on file   • Intimate partner violence:     Fear of current or ex partner: Not on file     Emotionally abused: Not on file     Physically abused: Not on file     Forced sexual activity: Not on file   Other Topics Concern   • Not on file   Social History Narrative   • Not on file       FAMILY HISTORY: No family history on file.    WOUND ASSESSMENT   Wound 12/28/19 Partial Thickness Wound Pretibial -Right Anterolateral LE (Active)   Wound Image   12/28/2019 10:15 AM   Site Assessment Pink 12/28/2019 10:15 AM   Patience-wound  Assessment Edema 12/28/2019 10:15 AM   Margins Attached edges 12/28/2019 10:15 AM   Post Wound Length (cm) 0.2 cm 12/28/2019 10:15 AM    Post Wound Width (cm) 0.8 cm 12/28/2019 10:15 AM   Post Wound Depth (cm) 0.2 cm 12/28/2019 10:15 AM   Post Wound Surface Area (cm^2) 0.16 cm^2 12/28/2019 10:15 AM   Drainage Amount Small 12/28/2019 10:15 AM   Drainage Description Serous 12/28/2019 10:15 AM   Non-staged Wound Description Partial thickness 12/28/2019 10:15 AM   Treatments Cleansed;Pharmaceutical agent;Other (Comment) 12/28/2019 10:15 AM   Cleansing Normal Saline Irrigation 12/28/2019 10:15 AM   Periwound Protectant Skin Protectant wipes to Periwound 12/28/2019 10:15 AM   Dressing Options Hydrocolloid Thin;Adhesive Foam;Tubigrip 12/28/2019 10:15 AM   Dressing Changed New 12/28/2019 10:15 AM   WOUND NURSE ONLY - Odor None 12/28/2019 10:15 AM   WOUND NURSE ONLY - Pulses 1+;DP;Other (Comments) 12/28/2019 10:15 AM   WOUND NURSE ONLY - Exposed Structures None 12/28/2019 10:15 AM   WOUND NURSE ONLY - Tissue Type and Percentage 100% pink moist tissue post-debridement. 12/28/2019 10:15 AM     Procedures:   -2% viscous lidocaine applied topically to wound bed for approximately 5 minutes prior to debridement  -Selective debridement with curette to remove ~0.15 cm2 slough from wound.    Post debridement photo:         PATIENT EDUCATION  - Etiology of venous ulceration discussed with patient  - Importance of managing edema for healing of ulcer, and for prevention of new ulcer development  -Need for lifelong compression of lower legs   -Elevate legs above the level of the heart periodically throughout the day.  - Importance of adequate nutrition for wound healing  -Increase protein intake (unless contraindicated by renal status)  -Advised to go to Urgent Care or ER for any increased redness, swelling, drainage or odor, or if patient develops fever, chills, nausea or vomiting.

## 2019-12-28 NOTE — PATIENT INSTRUCTIONS
-Keep dressings clean, dry and covered while bathing. Only change dressings if they become over saturated, soiled or fall off.     -Avoid prolonged standing or sitting without elevating your legs.    -Remove your compression garments if you have severe pain, severe swelling, numbness, color change, or temperature change in your toes. If you need to remove your compression garments, do so by unrolling them. Do not cut the compression garments off, this is to prevent cutting yourself on accident.    -Should you experience any significant changes in your wound(s), such as infection (redness, swelling, localized heat, increased pain, fever > 101 F, chills) or have any questions regarding your home care instructions, please contact the wound center at [phone]. If after hours, contact your primary care physician or go to the hospital emergency room.

## 2020-01-10 ENCOUNTER — APPOINTMENT (OUTPATIENT)
Dept: WOUND CARE | Facility: MEDICAL CENTER | Age: 55
End: 2020-01-10
Attending: HOSPITALIST
Payer: COMMERCIAL

## 2020-01-17 ENCOUNTER — APPOINTMENT (OUTPATIENT)
Dept: WOUND CARE | Facility: MEDICAL CENTER | Age: 55
End: 2020-01-17
Attending: HOSPITALIST
Payer: COMMERCIAL

## 2020-01-23 ENCOUNTER — HOSPITAL ENCOUNTER (OUTPATIENT)
Dept: LAB | Facility: MEDICAL CENTER | Age: 55
End: 2020-01-23
Attending: FAMILY MEDICINE
Payer: COMMERCIAL

## 2020-01-23 DIAGNOSIS — Z13.6 SCREENING FOR CARDIOVASCULAR CONDITION: ICD-10-CM

## 2020-01-23 LAB
CHOLEST SERPL-MCNC: 179 MG/DL (ref 100–199)
FASTING STATUS PATIENT QL REPORTED: NORMAL
HDLC SERPL-MCNC: 41 MG/DL
LDLC SERPL CALC-MCNC: 115 MG/DL
TRIGL SERPL-MCNC: 113 MG/DL (ref 0–149)

## 2020-01-23 PROCEDURE — 36415 COLL VENOUS BLD VENIPUNCTURE: CPT

## 2020-01-23 PROCEDURE — 80061 LIPID PANEL: CPT

## 2020-01-24 ENCOUNTER — APPOINTMENT (OUTPATIENT)
Dept: WOUND CARE | Facility: MEDICAL CENTER | Age: 55
End: 2020-01-24
Attending: HOSPITALIST
Payer: COMMERCIAL

## 2020-01-31 ENCOUNTER — APPOINTMENT (OUTPATIENT)
Dept: WOUND CARE | Facility: MEDICAL CENTER | Age: 55
End: 2020-01-31
Attending: HOSPITALIST
Payer: COMMERCIAL

## 2020-02-04 ENCOUNTER — HOSPITAL ENCOUNTER (OUTPATIENT)
Dept: LAB | Facility: MEDICAL CENTER | Age: 55
End: 2020-02-04
Attending: FAMILY MEDICINE
Payer: COMMERCIAL

## 2020-02-04 ENCOUNTER — OFFICE VISIT (OUTPATIENT)
Dept: MEDICAL GROUP | Facility: PHYSICIAN GROUP | Age: 55
End: 2020-02-04
Payer: COMMERCIAL

## 2020-02-04 VITALS
HEART RATE: 68 BPM | DIASTOLIC BLOOD PRESSURE: 90 MMHG | BODY MASS INDEX: 48.82 KG/M2 | HEIGHT: 65 IN | OXYGEN SATURATION: 96 % | WEIGHT: 293 LBS | SYSTOLIC BLOOD PRESSURE: 136 MMHG | TEMPERATURE: 98.8 F

## 2020-02-04 DIAGNOSIS — D64.9 ANEMIA, UNSPECIFIED TYPE: ICD-10-CM

## 2020-02-04 DIAGNOSIS — Z23 NEED FOR VACCINATION: ICD-10-CM

## 2020-02-04 DIAGNOSIS — N92.1 MENORRHAGIA WITH IRREGULAR CYCLE: ICD-10-CM

## 2020-02-04 DIAGNOSIS — I10 ESSENTIAL HYPERTENSION: ICD-10-CM

## 2020-02-04 DIAGNOSIS — I89.0 LYMPHEDEMA: ICD-10-CM

## 2020-02-04 DIAGNOSIS — K21.9 GASTROESOPHAGEAL REFLUX DISEASE, ESOPHAGITIS PRESENCE NOT SPECIFIED: ICD-10-CM

## 2020-02-04 PROBLEM — L03.115 CELLULITIS AND ABSCESS OF RIGHT LEG: Status: RESOLVED | Noted: 2019-12-09 | Resolved: 2020-02-04

## 2020-02-04 PROBLEM — L02.415 CELLULITIS AND ABSCESS OF RIGHT LEG: Status: RESOLVED | Noted: 2019-12-09 | Resolved: 2020-02-04

## 2020-02-04 LAB
ERYTHROCYTE [DISTWIDTH] IN BLOOD BY AUTOMATED COUNT: 53.2 FL (ref 35.9–50)
FERRITIN SERPL-MCNC: 40.9 NG/ML (ref 10–291)
HCT VFR BLD AUTO: 44.7 % (ref 37–47)
HGB BLD-MCNC: 13.6 G/DL (ref 12–16)
IRON SATN MFR SERPL: 14 % (ref 15–55)
IRON SERPL-MCNC: 58 UG/DL (ref 40–170)
MCH RBC QN AUTO: 28.2 PG (ref 27–33)
MCHC RBC AUTO-ENTMCNC: 30.4 G/DL (ref 33.6–35)
MCV RBC AUTO: 92.5 FL (ref 81.4–97.8)
PLATELET # BLD AUTO: 304 K/UL (ref 164–446)
PMV BLD AUTO: 11.1 FL (ref 9–12.9)
RBC # BLD AUTO: 4.83 M/UL (ref 4.2–5.4)
TIBC SERPL-MCNC: 417 UG/DL (ref 250–450)
WBC # BLD AUTO: 7 K/UL (ref 4.8–10.8)

## 2020-02-04 PROCEDURE — 82728 ASSAY OF FERRITIN: CPT

## 2020-02-04 PROCEDURE — 83540 ASSAY OF IRON: CPT

## 2020-02-04 PROCEDURE — 83550 IRON BINDING TEST: CPT

## 2020-02-04 PROCEDURE — 90715 TDAP VACCINE 7 YRS/> IM: CPT | Performed by: FAMILY MEDICINE

## 2020-02-04 PROCEDURE — 90471 IMMUNIZATION ADMIN: CPT | Performed by: FAMILY MEDICINE

## 2020-02-04 PROCEDURE — 85027 COMPLETE CBC AUTOMATED: CPT

## 2020-02-04 PROCEDURE — 36415 COLL VENOUS BLD VENIPUNCTURE: CPT

## 2020-02-04 PROCEDURE — 99214 OFFICE O/P EST MOD 30 MIN: CPT | Mod: 25 | Performed by: FAMILY MEDICINE

## 2020-02-04 RX ORDER — AMLODIPINE BESYLATE 10 MG/1
10 TABLET ORAL DAILY
Qty: 90 TAB | Refills: 3 | Status: SHIPPED | OUTPATIENT
Start: 2020-02-04 | End: 2021-03-15 | Stop reason: SDUPTHER

## 2020-02-04 RX ORDER — OMEPRAZOLE 20 MG/1
20 CAPSULE, DELAYED RELEASE ORAL DAILY
Qty: 90 CAP | Refills: 3 | Status: ON HOLD | OUTPATIENT
Start: 2020-02-04 | End: 2021-03-18

## 2020-02-04 RX ORDER — HYDROCHLOROTHIAZIDE 12.5 MG/1
12.5 TABLET ORAL DAILY
Qty: 90 TAB | Refills: 3 | Status: SHIPPED | OUTPATIENT
Start: 2020-02-04 | End: 2021-03-15 | Stop reason: SDUPTHER

## 2020-02-04 RX ORDER — CETIRIZINE HYDROCHLORIDE 10 MG/1
10 TABLET ORAL DAILY
Qty: 90 TAB | Refills: 3 | Status: ON HOLD | OUTPATIENT
Start: 2020-02-04 | End: 2021-03-18

## 2020-02-04 RX ORDER — LISINOPRIL 20 MG/1
20 TABLET ORAL DAILY
Qty: 90 TAB | Refills: 3 | Status: SHIPPED | OUTPATIENT
Start: 2020-02-04 | End: 2021-03-15 | Stop reason: SDUPTHER

## 2020-02-04 NOTE — PROGRESS NOTES
cc: Mood      Subjective:     An Rainey is a 54 y.o. female presenting for the following:     Anxiety/low mood: patient has been working on  herself from her family's problems.  Her mood overall is much improved from our last visit.  Much less daily worry, less crying.  Denies any suicidal or homicidal ideation.  She has been taking Lexapro 10 mg without side effect.    Edema-patient has been wearing the compression stockings. No recurrence of symptoms of cellulitis.  She was on hydrochlorothiazide in the past for blood pressure.  This did slightly help with her swelling as well.    HTN: patient is out of lisinopril today.  She has been taking amlodipine.  Also, she had been on hydrochlorothiazide in the past and denies side effect with this. Patient denies any chest pain, shortness of breath, palpitations, swelling, or lightheadedness.    GERD: patient has been taking zantac or pepcid at least daily. She does sometimes still have symptoms of acid reflux.  She believes she was on Prilosec in the past and does remember this working well for her.  She denies any melena, vomiting, blood in stool, shortness of breath.    Anemia-patient still does have heavy menses.  She has been borderline anemic and this has been improved with her vitamins.  She was slightly anemic on her last hospitalization for cellulitis but she was also given fluids while in the hospital.  She denies any shortness of breath, fatigue.      Review of systems:  All others reviewed and are negative.       Current Outpatient Medications:   •  amLODIPine (NORVASC) 10 MG Tab, Take 1 Tab by mouth every day., Disp: 90 Tab, Rfl: 3  •  cetirizine (ZYRTEC) 10 MG Tab, Take 1 Tab by mouth every day., Disp: 90 Tab, Rfl: 3  •  omeprazole (PRILOSEC) 20 MG delayed-release capsule, Take 1 Cap by mouth every day., Disp: 90 Cap, Rfl: 3  •  lisinopril (PRINIVIL) 20 MG Tab, Take 1 Tab by mouth every day., Disp: 90 Tab, Rfl: 3  •  hydroCHLOROthiazide  "(HYDRODIURIL) 12.5 MG tablet, Take 1 Tab by mouth every day., Disp: 90 Tab, Rfl: 3  •  calcium carbonate (OS-SPIKE 500) 500 MG Tab, Take 500 mg by mouth every day., Disp: , Rfl:   •  vitamin e (VITAMIN E) 400 UNIT Cap, Take 400 Units by mouth every day., Disp: , Rfl:   •  GARLIC PO, Take 1 Tab by mouth every day., Disp: , Rfl:   •  escitalopram (LEXAPRO) 10 MG Tab, Take 1 Tab by mouth every day., Disp: 90 Tab, Rfl: 1  •  ascorbic acid (ASCORBIC ACID) 500 MG Tab, Take 1,000 mg by mouth every day., Disp: , Rfl:   •  B Complex Vitamins (VITAMIN B COMPLEX) Tab, Take 1 Tab by mouth every day., Disp: , Rfl:   •  vitamin D (CHOLECALCIFEROL) 1000 UNIT Tab, Take 1,000 Units by mouth every day., Disp: , Rfl:   •  therapeutic multivitamin-minerals (THERAGRAN-M) Tab, Take 1 Tab by mouth every day., Disp: , Rfl:   •  aspirin EC (ECOTRIN) 325 MG Tablet Delayed Response, Take 325 mg by mouth every day., Disp: , Rfl:   •  fluticasone (FLONASE) 50 MCG/ACT nasal spray, Spray 1 Spray in nose every day., Disp: , Rfl:   •  Omega-3 Fatty Acids (FISH OIL) 1000 MG Cap capsule, Take 1,000 mg by mouth every day., Disp: , Rfl:     Allergies, past medical history, past surgical history, family history, social history reviewed and updated    Objective:     Vitals: /90 (BP Location: Right arm, Patient Position: Sitting, BP Cuff Size: Adult)   Pulse 68   Temp 37.1 °C (98.8 °F) (Temporal)   Ht 1.651 m (5' 5\")   Wt (!) 153.8 kg (339 lb)   SpO2 96%   BMI 56.41 kg/m²   General: Alert, pleasant, NAD  HEENT: Normocephalic.   EOMI, no icterus or pallor.    Heart: Regular rate   Respiratory: Normal respiratory effort.   Skin: Warm, dry, no rashes in exposed areas.  Psych:  Affect is normal, judgement is good, grooming is appropriate.    Assessment/Plan:     An was seen today for follow-up and medication refill.    Diagnoses and all orders for this visit:    Lymphedema: Chronic problem controlled with compression stockings.    Essential " hypertension: Poorly controlled chronic problem.  Will restart lisinopril, as patient has been out of this.  Also, will add on low-dose hydrochlorothiazide as patient has taken this in the past and did not feel well on it.  Warned of common side effects of this medication today.  -     amLODIPine (NORVASC) 10 MG Tab; Take 1 Tab by mouth every day.  -     lisinopril (PRINIVIL) 20 MG Tab; Take 1 Tab by mouth every day.  -     hydroCHLOROthiazide (HYDRODIURIL) 12.5 MG tablet; Take 1 Tab by mouth every day.    Gastroesophageal reflux disease, esophagitis presence not specified: Poorly controlled chronic problem.  Will switch from Pepcid to PPI, omeprazole.  Patient does take a B complex vitamin daily and she was told to continue this today.  -     omeprazole (PRILOSEC) 20 MG delayed-release capsule; Take 1 Cap by mouth every day.    Anemia, unspecified type/Menorrhagia with irregular cycle: Patient does have heavy menses still and has been anemic due to this.  She also is somewhat concerned about pregnancy even though she understands it is very unlikely at this age.  She would be interested in an ablation or hysterectomy.  -     FERRITIN; Future  -     IRON/TOTAL IRON BIND; Future  -     CBC WITHOUT DIFFERENTIAL; Future  -     REFERRAL TO GYNECOLOGY    Need for vaccination Patient due for vaccination.  Patient warned of possible side effect including pain, reaction at injection site, fatigue, low-grade fever.  Also, patient warned of uncommon severe reactions including allergic reaction/anaphylaxis.   -     Tdap Vaccine =>8YO IM    Other orders  -     cetirizine (ZYRTEC) 10 MG Tab; Take 1 Tab by mouth every day.      Patient believes she had a Pap smear in the last few years.  We will request records.    Patient given phone number to schedule mammogram today.    Return in about 3 months (around 5/4/2020), or if symptoms worsen or fail to improve.

## 2020-02-04 NOTE — LETTER
[facility]  Jayshree Armstrong M.D.  [address] [facility]  [place] [address]  Fax: [phone]   Authorization for Release/Disclosure of   Protected Health Information   Name: CESARIO BOSS : 1965 SSN: [id number]   Address: [address] [place] [address]  [place] [address] Phone:    [phone] (home)    I authorize the entity listed below to release/disclose the PHI below to:   [facility]/Jayshree Armstrong M.D. and Jayshree Armstrong M.D.   Provider or Entity Name:     Address   City, State, Zip   Phone:      Fax:     Reason for request: continuity of care   Information to be released:    [  ] LAST COLONOSCOPY,  including any PATH REPORT and follow-up  [  ] LAST FIT/COLOGUARD RESULT [  ] LAST DEXA  [  ] LAST MAMMOGRAM  [  ] LAST PAP  [  ] LAST LABS [  ] RETINA EXAM REPORT  [  ] IMMUNIZATION RECORDS  [  ] Release all info      [  ] Check here and initial the line next to each item to release ALL health information INCLUDING  _____ Care and treatment for drug and / or alcohol abuse  _____ HIV testing, infection status, or AIDS  _____ Genetic Testing    DATES OF SERVICE OR TIME PERIOD TO BE DISCLOSED: _____________  I understand and acknowledge that:  * This Authorization may be revoked at any time by you in writing, except if your health information has already been used or disclosed.  * Your health information that will be used or disclosed as a result of you signing this authorization could be re-disclosed by the recipient. If this occurs, your re-disclosed health information may no longer be protected by State or Federal laws.  * You may refuse to sign this Authorization. Your refusal will not affect your ability to obtain treatment.  * This Authorization becomes effective upon signing and will  on (date) __________.      If no date is indicated, this Authorization will  one (1) year from the signature date.    Name: Cesario Boss    Signature:   Date:     2020       PLEASE FAX REQUESTED RECORDS  BACK TO: [phone]

## 2020-02-04 NOTE — LETTER
[facility]  Jayshree Armstrong M.D.  [address] [facility] [address]  [place] [address]  Fax: [phone]   Authorization for Release/Disclosure of   Protected Health Information   Name: CESARIO BOSS : 1965 SSN: [id number]   Address: [address] [place] [address]  [place] [address] Phone:    [phone] (home)    I authorize the entity listed below to release/disclose the PHI below to:   [facility]/Jayshree Armstrong M.D. and Jayshree Armstrong M.D.   Provider or Entity Name:     Address   City, State, Zip   Phone:      Fax:     Reason for request: continuity of care   Information to be released:    [  ] LAST COLONOSCOPY,  including any PATH REPORT and follow-up  [  ] LAST FIT/COLOGUARD RESULT [  ] LAST DEXA  [  ] LAST MAMMOGRAM  [  ] LAST PAP  [  ] LAST LABS [  ] RETINA EXAM REPORT  [  ] IMMUNIZATION RECORDS  [  ] Release all info      [  ] Check here and initial the line next to each item to release ALL health information INCLUDING  _____ Care and treatment for drug and / or alcohol abuse  _____ HIV testing, infection status, or AIDS  _____ Genetic Testing    DATES OF SERVICE OR TIME PERIOD TO BE DISCLOSED: _____________  I understand and acknowledge that:  * This Authorization may be revoked at any time by you in writing, except if your health information has already been used or disclosed.  * Your health information that will be used or disclosed as a result of you signing this authorization could be re-disclosed by the recipient. If this occurs, your re-disclosed health information may no longer be protected by State or Federal laws.  * You may refuse to sign this Authorization. Your refusal will not affect your ability to obtain treatment.  * This Authorization becomes effective upon signing and will  on (date) __________.      If no date is indicated, this Authorization will  one (1) year from the signature date.    Name: Cesario Boss    Signature:   Date:     2020       PLEASE FAX REQUESTED RECORDS  BACK TO: [phone]

## 2020-07-19 DIAGNOSIS — F33.41 RECURRENT MAJOR DEPRESSIVE DISORDER, IN PARTIAL REMISSION (HCC): ICD-10-CM

## 2020-07-22 RX ORDER — ESCITALOPRAM OXALATE 10 MG/1
TABLET ORAL
Qty: 90 TAB | Refills: 1 | Status: ON HOLD | OUTPATIENT
Start: 2020-07-22 | End: 2021-03-18

## 2021-03-15 DIAGNOSIS — I10 ESSENTIAL HYPERTENSION: ICD-10-CM

## 2021-03-16 RX ORDER — AMLODIPINE BESYLATE 10 MG/1
10 TABLET ORAL DAILY
Qty: 90 TABLET | Refills: 0 | Status: ON HOLD | OUTPATIENT
Start: 2021-03-16 | End: 2021-03-18 | Stop reason: SDUPTHER

## 2021-03-16 RX ORDER — LISINOPRIL 20 MG/1
20 TABLET ORAL DAILY
Qty: 90 TABLET | Refills: 0 | Status: ON HOLD | OUTPATIENT
Start: 2021-03-16 | End: 2021-03-18 | Stop reason: SDUPTHER

## 2021-03-16 RX ORDER — HYDROCHLOROTHIAZIDE 12.5 MG/1
12.5 TABLET ORAL DAILY
Qty: 90 TABLET | Refills: 0 | Status: ON HOLD | OUTPATIENT
Start: 2021-03-16 | End: 2021-03-18

## 2021-03-17 ENCOUNTER — HOSPITAL ENCOUNTER (OUTPATIENT)
Facility: MEDICAL CENTER | Age: 56
End: 2021-03-18
Attending: EMERGENCY MEDICINE | Admitting: INTERNAL MEDICINE
Payer: COMMERCIAL

## 2021-03-17 ENCOUNTER — APPOINTMENT (OUTPATIENT)
Dept: RADIOLOGY | Facility: MEDICAL CENTER | Age: 56
End: 2021-03-17
Attending: EMERGENCY MEDICINE
Payer: COMMERCIAL

## 2021-03-17 ENCOUNTER — APPOINTMENT (OUTPATIENT)
Dept: RADIOLOGY | Facility: MEDICAL CENTER | Age: 56
End: 2021-03-17
Attending: EMERGENCY MEDICINE

## 2021-03-17 DIAGNOSIS — I10 ESSENTIAL HYPERTENSION: ICD-10-CM

## 2021-03-17 LAB
ALBUMIN SERPL BCP-MCNC: 4.2 G/DL (ref 3.2–4.9)
ALBUMIN/GLOB SERPL: 1.4 G/DL
ALP SERPL-CCNC: 81 U/L (ref 30–99)
ALT SERPL-CCNC: 21 U/L (ref 2–50)
ANION GAP SERPL CALC-SCNC: 11 MMOL/L (ref 7–16)
AST SERPL-CCNC: 21 U/L (ref 12–45)
BASOPHILS # BLD AUTO: 0.4 % (ref 0–1.8)
BASOPHILS # BLD: 0.04 K/UL (ref 0–0.12)
BILIRUB SERPL-MCNC: 0.4 MG/DL (ref 0.1–1.5)
BUN SERPL-MCNC: 12 MG/DL (ref 8–22)
CALCIUM SERPL-MCNC: 9.2 MG/DL (ref 8.4–10.2)
CHLORIDE SERPL-SCNC: 105 MMOL/L (ref 96–112)
CO2 SERPL-SCNC: 23 MMOL/L (ref 20–33)
CREAT SERPL-MCNC: 0.67 MG/DL (ref 0.5–1.4)
EKG IMPRESSION: NORMAL
EOSINOPHIL # BLD AUTO: 0.27 K/UL (ref 0–0.51)
EOSINOPHIL NFR BLD: 3 % (ref 0–6.9)
ERYTHROCYTE [DISTWIDTH] IN BLOOD BY AUTOMATED COUNT: 49.5 FL (ref 35.9–50)
GLOBULIN SER CALC-MCNC: 3 G/DL (ref 1.9–3.5)
GLUCOSE SERPL-MCNC: 99 MG/DL (ref 65–99)
HCT VFR BLD AUTO: 39.6 % (ref 37–47)
HGB BLD-MCNC: 12.4 G/DL (ref 12–16)
IMM GRANULOCYTES # BLD AUTO: 0.03 K/UL (ref 0–0.11)
IMM GRANULOCYTES NFR BLD AUTO: 0.3 % (ref 0–0.9)
LYMPHOCYTES # BLD AUTO: 2.79 K/UL (ref 1–4.8)
LYMPHOCYTES NFR BLD: 30.5 % (ref 22–41)
MCH RBC QN AUTO: 27.7 PG (ref 27–33)
MCHC RBC AUTO-ENTMCNC: 31.3 G/DL (ref 33.6–35)
MCV RBC AUTO: 88.4 FL (ref 81.4–97.8)
MONOCYTES # BLD AUTO: 0.63 K/UL (ref 0–0.85)
MONOCYTES NFR BLD AUTO: 6.9 % (ref 0–13.4)
NEUTROPHILS # BLD AUTO: 5.38 K/UL (ref 2–7.15)
NEUTROPHILS NFR BLD: 58.9 % (ref 44–72)
NRBC # BLD AUTO: 0 K/UL
NRBC BLD-RTO: 0 /100 WBC
PLATELET # BLD AUTO: 282 K/UL (ref 164–446)
PMV BLD AUTO: 10.2 FL (ref 9–12.9)
POTASSIUM SERPL-SCNC: 3.7 MMOL/L (ref 3.6–5.5)
PROT SERPL-MCNC: 7.2 G/DL (ref 6–8.2)
RBC # BLD AUTO: 4.48 M/UL (ref 4.2–5.4)
SARS-COV+SARS-COV-2 AG RESP QL IA.RAPID: NOTDETECTED
SODIUM SERPL-SCNC: 139 MMOL/L (ref 135–145)
SPECIMEN SOURCE: NORMAL
TROPONIN T SERPL-MCNC: 7 NG/L (ref 6–19)
TROPONIN T SERPL-MCNC: 8 NG/L (ref 6–19)
WBC # BLD AUTO: 9.1 K/UL (ref 4.8–10.8)

## 2021-03-17 PROCEDURE — 96374 THER/PROPH/DIAG INJ IV PUSH: CPT

## 2021-03-17 PROCEDURE — 93005 ELECTROCARDIOGRAM TRACING: CPT

## 2021-03-17 PROCEDURE — 71045 X-RAY EXAM CHEST 1 VIEW: CPT

## 2021-03-17 PROCEDURE — 87426 SARSCOV CORONAVIRUS AG IA: CPT

## 2021-03-17 PROCEDURE — 93005 ELECTROCARDIOGRAM TRACING: CPT | Performed by: EMERGENCY MEDICINE

## 2021-03-17 PROCEDURE — 85025 COMPLETE CBC W/AUTO DIFF WBC: CPT

## 2021-03-17 PROCEDURE — 36415 COLL VENOUS BLD VENIPUNCTURE: CPT

## 2021-03-17 PROCEDURE — 84484 ASSAY OF TROPONIN QUANT: CPT

## 2021-03-17 PROCEDURE — C9803 HOPD COVID-19 SPEC COLLECT: HCPCS | Performed by: EMERGENCY MEDICINE

## 2021-03-17 PROCEDURE — 99285 EMERGENCY DEPT VISIT HI MDM: CPT

## 2021-03-17 PROCEDURE — 80053 COMPREHEN METABOLIC PANEL: CPT

## 2021-03-17 PROCEDURE — U0003 INFECTIOUS AGENT DETECTION BY NUCLEIC ACID (DNA OR RNA); SEVERE ACUTE RESPIRATORY SYNDROME CORONAVIRUS 2 (SARS-COV-2) (CORONAVIRUS DISEASE [COVID-19]), AMPLIFIED PROBE TECHNIQUE, MAKING USE OF HIGH THROUGHPUT TECHNOLOGIES AS DESCRIBED BY CMS-2020-01-R: HCPCS

## 2021-03-17 PROCEDURE — U0005 INFEC AGEN DETEC AMPLI PROBE: HCPCS

## 2021-03-17 RX ORDER — HYDRALAZINE HYDROCHLORIDE 20 MG/ML
10 INJECTION INTRAMUSCULAR; INTRAVENOUS ONCE
Status: DISCONTINUED | OUTPATIENT
Start: 2021-03-17 | End: 2021-03-18 | Stop reason: HOSPADM

## 2021-03-17 ASSESSMENT — PAIN DESCRIPTION - PAIN TYPE: TYPE: ACUTE PAIN

## 2021-03-17 ASSESSMENT — FIBROSIS 4 INDEX: FIB4 SCORE: 0.72

## 2021-03-18 ENCOUNTER — APPOINTMENT (OUTPATIENT)
Dept: CARDIOLOGY | Facility: MEDICAL CENTER | Age: 56
End: 2021-03-18
Attending: INTERNAL MEDICINE
Payer: COMMERCIAL

## 2021-03-18 ENCOUNTER — PATIENT OUTREACH (OUTPATIENT)
Dept: HEALTH INFORMATION MANAGEMENT | Facility: OTHER | Age: 56
End: 2021-03-18

## 2021-03-18 ENCOUNTER — APPOINTMENT (OUTPATIENT)
Dept: RADIOLOGY | Facility: MEDICAL CENTER | Age: 56
End: 2021-03-18
Attending: EMERGENCY MEDICINE
Payer: COMMERCIAL

## 2021-03-18 VITALS
RESPIRATION RATE: 20 BRPM | SYSTOLIC BLOOD PRESSURE: 126 MMHG | TEMPERATURE: 97.6 F | WEIGHT: 293 LBS | OXYGEN SATURATION: 95 % | BODY MASS INDEX: 48.82 KG/M2 | HEART RATE: 49 BPM | DIASTOLIC BLOOD PRESSURE: 69 MMHG | HEIGHT: 65 IN

## 2021-03-18 PROBLEM — I16.0 HYPERTENSIVE URGENCY: Status: ACTIVE | Noted: 2021-03-18

## 2021-03-18 PROBLEM — I16.0 HYPERTENSIVE URGENCY: Status: RESOLVED | Noted: 2021-03-18 | Resolved: 2021-03-18

## 2021-03-18 PROBLEM — F41.9 ANXIETY: Status: ACTIVE | Noted: 2021-03-18

## 2021-03-18 LAB
LV EJECT FRACT  99904: 65
LV EJECT FRACT MOD 4C 99902: 74.46
SARS-COV-2 RNA RESP QL NAA+PROBE: NOTDETECTED
SPECIMEN SOURCE: NORMAL
TROPONIN T SERPL-MCNC: 7 NG/L (ref 6–19)

## 2021-03-18 PROCEDURE — 93306 TTE W/DOPPLER COMPLETE: CPT | Mod: 26 | Performed by: INTERNAL MEDICINE

## 2021-03-18 PROCEDURE — 70450 CT HEAD/BRAIN W/O DYE: CPT

## 2021-03-18 PROCEDURE — 700102 HCHG RX REV CODE 250 W/ 637 OVERRIDE(OP): Performed by: INTERNAL MEDICINE

## 2021-03-18 PROCEDURE — 700102 HCHG RX REV CODE 250 W/ 637 OVERRIDE(OP)

## 2021-03-18 PROCEDURE — 93306 TTE W/DOPPLER COMPLETE: CPT

## 2021-03-18 PROCEDURE — 99236 HOSP IP/OBS SAME DATE HI 85: CPT | Performed by: INTERNAL MEDICINE

## 2021-03-18 PROCEDURE — A9270 NON-COVERED ITEM OR SERVICE: HCPCS

## 2021-03-18 PROCEDURE — A9270 NON-COVERED ITEM OR SERVICE: HCPCS | Performed by: INTERNAL MEDICINE

## 2021-03-18 PROCEDURE — 96372 THER/PROPH/DIAG INJ SC/IM: CPT

## 2021-03-18 PROCEDURE — 84484 ASSAY OF TROPONIN QUANT: CPT

## 2021-03-18 PROCEDURE — 700111 HCHG RX REV CODE 636 W/ 250 OVERRIDE (IP): Performed by: EMERGENCY MEDICINE

## 2021-03-18 PROCEDURE — G0378 HOSPITAL OBSERVATION PER HR: HCPCS

## 2021-03-18 PROCEDURE — A9270 NON-COVERED ITEM OR SERVICE: HCPCS | Performed by: EMERGENCY MEDICINE

## 2021-03-18 PROCEDURE — 90471 IMMUNIZATION ADMIN: CPT

## 2021-03-18 PROCEDURE — 90686 IIV4 VACC NO PRSV 0.5 ML IM: CPT | Performed by: INTERNAL MEDICINE

## 2021-03-18 PROCEDURE — 700111 HCHG RX REV CODE 636 W/ 250 OVERRIDE (IP): Performed by: INTERNAL MEDICINE

## 2021-03-18 PROCEDURE — 700102 HCHG RX REV CODE 250 W/ 637 OVERRIDE(OP): Performed by: EMERGENCY MEDICINE

## 2021-03-18 PROCEDURE — 700117 HCHG RX CONTRAST REV CODE 255: Performed by: INTERNAL MEDICINE

## 2021-03-18 RX ORDER — LISINOPRIL 20 MG/1
20 TABLET ORAL DAILY
Status: DISCONTINUED | OUTPATIENT
Start: 2021-03-19 | End: 2021-03-18 | Stop reason: HOSPADM

## 2021-03-18 RX ORDER — HYDROCHLOROTHIAZIDE 12.5 MG/1
12.5 TABLET ORAL DAILY
Qty: 30 TABLET | Refills: 0 | Status: SHIPPED | OUTPATIENT
Start: 2021-03-19 | End: 2021-04-18

## 2021-03-18 RX ORDER — AMOXICILLIN 250 MG
2 CAPSULE ORAL 2 TIMES DAILY
Status: DISCONTINUED | OUTPATIENT
Start: 2021-03-18 | End: 2021-03-18 | Stop reason: HOSPADM

## 2021-03-18 RX ORDER — BISACODYL 10 MG
10 SUPPOSITORY, RECTAL RECTAL
Status: DISCONTINUED | OUTPATIENT
Start: 2021-03-18 | End: 2021-03-18 | Stop reason: HOSPADM

## 2021-03-18 RX ORDER — OMEPRAZOLE 20 MG/1
20 CAPSULE, DELAYED RELEASE ORAL DAILY
Status: DISCONTINUED | OUTPATIENT
Start: 2021-03-18 | End: 2021-03-18 | Stop reason: HOSPADM

## 2021-03-18 RX ORDER — AMLODIPINE BESYLATE 10 MG/1
10 TABLET ORAL DAILY
Qty: 30 TABLET | Refills: 0 | Status: SHIPPED | OUTPATIENT
Start: 2021-03-18 | End: 2021-04-17

## 2021-03-18 RX ORDER — OXYCODONE HYDROCHLORIDE 5 MG/1
5 TABLET ORAL
Status: DISCONTINUED | OUTPATIENT
Start: 2021-03-18 | End: 2021-03-18 | Stop reason: HOSPADM

## 2021-03-18 RX ORDER — LISINOPRIL 20 MG/1
20 TABLET ORAL DAILY
Qty: 90 TABLET | Refills: 0 | Status: SHIPPED | OUTPATIENT
Start: 2021-03-18 | End: 2021-05-18 | Stop reason: SDUPTHER

## 2021-03-18 RX ORDER — ACETAMINOPHEN 325 MG/1
650 TABLET ORAL EVERY 6 HOURS PRN
Status: DISCONTINUED | OUTPATIENT
Start: 2021-03-18 | End: 2021-03-18 | Stop reason: HOSPADM

## 2021-03-18 RX ORDER — AMLODIPINE BESYLATE 5 MG/1
10 TABLET ORAL ONCE
Status: COMPLETED | OUTPATIENT
Start: 2021-03-18 | End: 2021-03-18

## 2021-03-18 RX ORDER — LORAZEPAM 1 MG/1
1 TABLET ORAL ONCE
Status: DISCONTINUED | OUTPATIENT
Start: 2021-03-18 | End: 2021-03-18 | Stop reason: HOSPADM

## 2021-03-18 RX ORDER — LISINOPRIL 20 MG/1
20 TABLET ORAL DAILY
Status: DISCONTINUED | OUTPATIENT
Start: 2021-03-18 | End: 2021-03-18

## 2021-03-18 RX ORDER — HYDROCHLOROTHIAZIDE 25 MG/1
12.5 TABLET ORAL ONCE
Status: COMPLETED | OUTPATIENT
Start: 2021-03-18 | End: 2021-03-18

## 2021-03-18 RX ORDER — OXYCODONE HYDROCHLORIDE 10 MG/1
10 TABLET ORAL
Status: DISCONTINUED | OUTPATIENT
Start: 2021-03-18 | End: 2021-03-18 | Stop reason: HOSPADM

## 2021-03-18 RX ORDER — AMLODIPINE BESYLATE 5 MG/1
10 TABLET ORAL DAILY
Status: DISCONTINUED | OUTPATIENT
Start: 2021-03-18 | End: 2021-03-18

## 2021-03-18 RX ORDER — ONDANSETRON 2 MG/ML
4 INJECTION INTRAMUSCULAR; INTRAVENOUS EVERY 4 HOURS PRN
Status: DISCONTINUED | OUTPATIENT
Start: 2021-03-18 | End: 2021-03-18 | Stop reason: HOSPADM

## 2021-03-18 RX ORDER — LABETALOL HYDROCHLORIDE 5 MG/ML
10 INJECTION, SOLUTION INTRAVENOUS EVERY 4 HOURS PRN
Status: DISCONTINUED | OUTPATIENT
Start: 2021-03-18 | End: 2021-03-18 | Stop reason: HOSPADM

## 2021-03-18 RX ORDER — HYDROCHLOROTHIAZIDE 25 MG/1
12.5 TABLET ORAL DAILY
Status: DISCONTINUED | OUTPATIENT
Start: 2021-03-18 | End: 2021-03-18

## 2021-03-18 RX ORDER — LORAZEPAM 2 MG/ML
1 INJECTION INTRAMUSCULAR ONCE
Status: COMPLETED | OUTPATIENT
Start: 2021-03-18 | End: 2021-03-18

## 2021-03-18 RX ORDER — AMLODIPINE BESYLATE 5 MG/1
10 TABLET ORAL DAILY
Status: DISCONTINUED | OUTPATIENT
Start: 2021-03-19 | End: 2021-03-18 | Stop reason: HOSPADM

## 2021-03-18 RX ORDER — LISINOPRIL 20 MG/1
20 TABLET ORAL ONCE
Status: COMPLETED | OUTPATIENT
Start: 2021-03-18 | End: 2021-03-18

## 2021-03-18 RX ORDER — PROMETHAZINE HYDROCHLORIDE 25 MG/1
12.5-25 SUPPOSITORY RECTAL EVERY 4 HOURS PRN
Status: DISCONTINUED | OUTPATIENT
Start: 2021-03-18 | End: 2021-03-18 | Stop reason: HOSPADM

## 2021-03-18 RX ORDER — ONDANSETRON 4 MG/1
4 TABLET, ORALLY DISINTEGRATING ORAL EVERY 4 HOURS PRN
Status: DISCONTINUED | OUTPATIENT
Start: 2021-03-18 | End: 2021-03-18 | Stop reason: HOSPADM

## 2021-03-18 RX ORDER — POLYETHYLENE GLYCOL 3350 17 G/17G
1 POWDER, FOR SOLUTION ORAL
Status: DISCONTINUED | OUTPATIENT
Start: 2021-03-18 | End: 2021-03-18 | Stop reason: HOSPADM

## 2021-03-18 RX ORDER — PROCHLORPERAZINE EDISYLATE 5 MG/ML
5-10 INJECTION INTRAMUSCULAR; INTRAVENOUS EVERY 4 HOURS PRN
Status: DISCONTINUED | OUTPATIENT
Start: 2021-03-18 | End: 2021-03-18 | Stop reason: HOSPADM

## 2021-03-18 RX ORDER — MORPHINE SULFATE 4 MG/ML
4 INJECTION, SOLUTION INTRAMUSCULAR; INTRAVENOUS
Status: DISCONTINUED | OUTPATIENT
Start: 2021-03-18 | End: 2021-03-18 | Stop reason: HOSPADM

## 2021-03-18 RX ORDER — PROMETHAZINE HYDROCHLORIDE 25 MG/1
12.5-25 TABLET ORAL EVERY 4 HOURS PRN
Status: DISCONTINUED | OUTPATIENT
Start: 2021-03-18 | End: 2021-03-18 | Stop reason: HOSPADM

## 2021-03-18 RX ORDER — HYDROCHLOROTHIAZIDE 25 MG/1
12.5 TABLET ORAL DAILY
Status: DISCONTINUED | OUTPATIENT
Start: 2021-03-19 | End: 2021-03-18 | Stop reason: HOSPADM

## 2021-03-18 RX ORDER — LORAZEPAM 2 MG/ML
0.5 INJECTION INTRAMUSCULAR EVERY 4 HOURS PRN
Status: DISCONTINUED | OUTPATIENT
Start: 2021-03-18 | End: 2021-03-18 | Stop reason: HOSPADM

## 2021-03-18 RX ADMIN — ENOXAPARIN SODIUM 40 MG: 40 INJECTION SUBCUTANEOUS at 05:21

## 2021-03-18 RX ADMIN — INFLUENZA A VIRUS A/GUANGDONG-MAONAN/SWL1536/2019 CNIC-1909 (H1N1) ANTIGEN (FORMALDEHYDE INACTIVATED), INFLUENZA A VIRUS A/HONG KONG/2671/2019 (H3N2) ANTIGEN (FORMALDEHYDE INACTIVATED), INFLUENZA B VIRUS B/PHUKET/3073/2013 ANTIGEN (FORMALDEHYDE INACTIVATED), AND INFLUENZA B VIRUS B/WASHINGTON/02/2019 ANTIGEN (FORMALDEHYDE INACTIVATED) 0.5 ML: 15; 15; 15; 15 INJECTION, SUSPENSION INTRAMUSCULAR at 11:13

## 2021-03-18 RX ADMIN — LORAZEPAM 1 MG: 2 INJECTION INTRAMUSCULAR; INTRAVENOUS at 00:51

## 2021-03-18 RX ADMIN — HUMAN ALBUMIN MICROSPHERES AND PERFLUTREN 3 ML: 10; .22 INJECTION, SOLUTION INTRAVENOUS at 09:50

## 2021-03-18 RX ADMIN — ASPIRIN 325 MG: 325 TABLET, COATED ORAL at 05:20

## 2021-03-18 RX ADMIN — HYDROCHLOROTHIAZIDE 12.5 MG: 25 TABLET ORAL at 01:39

## 2021-03-18 RX ADMIN — LISINOPRIL 20 MG: 20 TABLET ORAL at 01:40

## 2021-03-18 RX ADMIN — AMLODIPINE BESYLATE 10 MG: 5 TABLET ORAL at 01:40

## 2021-03-18 RX ADMIN — OMEPRAZOLE 20 MG: 20 CAPSULE, DELAYED RELEASE ORAL at 05:20

## 2021-03-18 SDOH — ECONOMIC STABILITY: INCOME INSECURITY: HOW HARD IS IT FOR YOU TO PAY FOR THE VERY BASICS LIKE FOOD, HOUSING, MEDICAL CARE, AND HEATING?: NOT HARD AT ALL

## 2021-03-18 SDOH — ECONOMIC STABILITY: FOOD INSECURITY: WITHIN THE PAST 12 MONTHS, YOU WORRIED THAT YOUR FOOD WOULD RUN OUT BEFORE YOU GOT MONEY TO BUY MORE.: NEVER TRUE

## 2021-03-18 SDOH — ECONOMIC STABILITY: FOOD INSECURITY: WITHIN THE PAST 12 MONTHS, THE FOOD YOU BOUGHT JUST DIDN'T LAST AND YOU DIDN'T HAVE MONEY TO GET MORE.: NEVER TRUE

## 2021-03-18 SDOH — ECONOMIC STABILITY: TRANSPORTATION INSECURITY
IN THE PAST 12 MONTHS, HAS THE LACK OF TRANSPORTATION KEPT YOU FROM MEDICAL APPOINTMENTS OR FROM GETTING MEDICATIONS?: NO

## 2021-03-18 SDOH — ECONOMIC STABILITY: TRANSPORTATION INSECURITY
IN THE PAST 12 MONTHS, HAS LACK OF TRANSPORTATION KEPT YOU FROM MEETINGS, WORK, OR FROM GETTING THINGS NEEDED FOR DAILY LIVING?: NO

## 2021-03-18 ASSESSMENT — ENCOUNTER SYMPTOMS
DIARRHEA: 0
SPUTUM PRODUCTION: 0
CONSTIPATION: 0
LOSS OF CONSCIOUSNESS: 0
FEVER: 0
PALPITATIONS: 0
NERVOUS/ANXIOUS: 1
HEADACHES: 1
DEPRESSION: 0
ABDOMINAL PAIN: 0
CHILLS: 0
NAUSEA: 0
VOMITING: 0
DIZZINESS: 1
FALLS: 0
COUGH: 0
STRIDOR: 0
WEAKNESS: 0
SHORTNESS OF BREATH: 1
MYALGIAS: 0
TINGLING: 0

## 2021-03-18 ASSESSMENT — LIFESTYLE VARIABLES
EVER FELT BAD OR GUILTY ABOUT YOUR DRINKING: NO
CONSUMPTION TOTAL: NEGATIVE
ON A TYPICAL DAY WHEN YOU DRINK ALCOHOL HOW MANY DRINKS DO YOU HAVE: 0
EVER HAD A DRINK FIRST THING IN THE MORNING TO STEADY YOUR NERVES TO GET RID OF A HANGOVER: NO
TOTAL SCORE: 0
TOTAL SCORE: 0
HAVE PEOPLE ANNOYED YOU BY CRITICIZING YOUR DRINKING: NO
ALCOHOL_USE: YES
TOTAL SCORE: 0
HAVE YOU EVER FELT YOU SHOULD CUT DOWN ON YOUR DRINKING: NO
AVERAGE NUMBER OF DAYS PER WEEK YOU HAVE A DRINK CONTAINING ALCOHOL: 0
HOW MANY TIMES IN THE PAST YEAR HAVE YOU HAD 5 OR MORE DRINKS IN A DAY: 0

## 2021-03-18 ASSESSMENT — COGNITIVE AND FUNCTIONAL STATUS - GENERAL
SUGGESTED CMS G CODE MODIFIER DAILY ACTIVITY: CH
DAILY ACTIVITIY SCORE: 24
MOBILITY SCORE: 24
SUGGESTED CMS G CODE MODIFIER MOBILITY: CH

## 2021-03-18 ASSESSMENT — PAIN DESCRIPTION - PAIN TYPE: TYPE: ACUTE PAIN

## 2021-03-18 ASSESSMENT — FIBROSIS 4 INDEX: FIB4 SCORE: 0.86

## 2021-03-18 ASSESSMENT — PATIENT HEALTH QUESTIONNAIRE - PHQ9
1. LITTLE INTEREST OR PLEASURE IN DOING THINGS: NOT AT ALL
SUM OF ALL RESPONSES TO PHQ9 QUESTIONS 1 AND 2: 0
2. FEELING DOWN, DEPRESSED, IRRITABLE, OR HOPELESS: NOT AT ALL

## 2021-03-18 NOTE — ED TRIAGE NOTES
Pt. Rounded on and updated to wait time. Pt. Asked to alert triage RN to any new concerns or changes. Pt. Alert/awake in NAD at this time.

## 2021-03-18 NOTE — PROGRESS NOTES
Pt arrives to unit from ER via gur[place]. Ambulated from gurney to bed. PT A&Ox4. Tele monitor applied, vitals taken. History, allergies and med rec reviewed and admission profile completed.     Pt oriented to unit and POC discussed, including medications, labs, and echo. Welcome folder provided and discussed. Communication board filled out. Pt instructed to call light usage and encouraged to call for any questions, needs, or concerns and prior to getting out of bed. Fall precautions in place. Pt agrees with the plan and declines any needs at this time. Bed locked and low.

## 2021-03-18 NOTE — PROGRESS NOTES
Telemetry Shift Summary    Rhythm: SB  HR: 50s  Ectopy: r-pvc    Measurements for strip printed 3/18/2021 at 1109  HR 51  0.16 / 0.10 / 0.42    Normal Values  Rhythm: SR  HR:   Measurements: 0.12-0.20 / 0.06-0.10 / 0.30-0.52

## 2021-03-18 NOTE — ED PROVIDER NOTES
"ED Provider Note    CHIEF COMPLAINT  Chief Complaint   Patient presents with   • Chest Pain     Reports L sided CP with radiation to L jaw.    • Dizziness     Reports \"hard to keep my balance\".   • Ear Pain     L ear more than R.    • Headache     HPI  Patient is a 55-year-old female with a past medical history of hypertension, angina who presents emergency room for several complaints.  She says all day for the last several days she has had this intermittent dizziness which she reports was associated with headaches, ear sensations and a pressure and intermittent ear pain on the left side.  She checked her blood pressure and noticed that it was substantially high and she has not been on her blood pressure medications for more than 3 months.  She ran out and has been unable to see her practitioner since she lost her job.  She went out for dinner tonight at approximately 6:30 PM and had sudden onset left central chest pressure sensation that started while she was getting up and walking.  This had some slight abatement but then came back in waves.  She had some slight diaphoresis and nausea at the time for one episode but this is has subsided as well.    Currently having only mild headache, has no symptoms on these pain sensations have gone down to the left side of her jaw.  She has a hard time telling if this is coming from her headache or if this is coming from her central chest pain.    PPE Note: I personally donned full PPE for all patient encounters during this visit, including being clean-shaven with an N95 respirator mask, gloves, and goggles.     REVIEW OF SYSTEMS  Constitutional: No fevers, chills, or recent illness.  Skin: No rashes or diaphoresis.  Eyes: No change in vision, no discharge.  ENT: Bilateral ear pain, no discharge, no rhinorrhea or nasal congestion, no ST or difficulty swallowing.  Respiratory: No SOB. No coughing or hemoptysis. No Wheezing.  Cardiac: as above, no palpitations, edema. No PND or " orthopnea.  GI: No Abdominal Pain; diarrhea, constipation. No blood in stool.  : No dysuria. No D/C. No frequency or urgency. No hesitancy.  MSK: No pain in joints or muscles. No calf pain or swelling.  Neuro: No paresthesias. No focal weakness.  Endocrine: No polyuria or polydipsia. No heat or cold intolerance.  Heme: No easy bruising. No history of bleeding disorders or anemia.    PAST MEDICAL HISTORY   has a past medical history of HTN (hypertension) (4/27/2019) and Lymph edema.    SOCIAL HISTORY  Social History     Tobacco Use   • Smoking status: Never Smoker   • Smokeless tobacco: Never Used   Substance and Sexual Activity   • Alcohol use: Yes     Comment: occ   • Drug use: No   • Sexual activity: Not on file       SURGICAL HISTORY  patient denies any surgical history    CURRENT MEDICATIONS  Home Medications     Reviewed by Mildred Dunn R.N. (Registered Nurse) on 03/17/21 at 2323  Med List Status: Complete   Medication Last Dose Status   amLODIPine (NORVASC) 10 MG Tab 3/10/2021 Active   ascorbic acid (ASCORBIC ACID) 500 MG Tab 3/17/2021 Active   aspirin EC (ECOTRIN) 325 MG Tablet Delayed Response 3/17/2021 Active   B Complex Vitamins (VITAMIN B COMPLEX) Tab 3/17/2021 Active   calcium carbonate (OS-SPIKE 500) 500 MG Tab  Active   cetirizine (ZYRTEC) 10 MG Tab 3/17/2021 Active   escitalopram (LEXAPRO) 10 MG Tab  Active   fluticasone (FLONASE) 50 MCG/ACT nasal spray 3/16/2021 Active   GARLIC PO  Active   hydroCHLOROthiazide (HYDRODIURIL) 12.5 MG tablet 3/10/2021 Active   lisinopril (PRINIVIL) 20 MG Tab 3/10/2021 Active   Omega-3 Fatty Acids (FISH OIL) 1000 MG Cap capsule 3/17/2021 Active   omeprazole (PRILOSEC) 20 MG delayed-release capsule 3/17/2021 Active   therapeutic multivitamin-minerals (THERAGRAN-M) Tab 3/17/2021 Active   vitamin D (CHOLECALCIFEROL) 1000 UNIT Tab 3/17/2021 Active   vitamin e (VITAMIN E) 400 UNIT Cap  Active              ALLERGIES  No Known Allergies    PHYSICAL EXAM  VITAL SIGNS: BP  "(!) 191/83   Pulse (!) 50   Temp 36.2 °C (97.1 °F) (Temporal)   Resp 18   Ht 1.651 m (5' 5\")   Wt (!) 165 kg (364 lb 10.3 oz)   SpO2 95%   BMI 60.68 kg/m²   Pulse ox interpretation: I interpret this pulse ox as normal.  Genl: F sitting in gurney uncomfortably, speaking clearly, appears in mild distress   Head: NC/AT   ENT: Mucous membranes moist, posterior pharynx clear, uvula midline, nares patent bilaterally   Eyes: Normal sclera, pupils equal round reactive to light  Neck: Supple, FROM, no LAD appreciated  Pulmonary: Lungs are clear to auscultation bilaterally  Chest: No TTP  CV:  RRR, no murmur appreciated, pulses 2+ in both upper and lower extremities,  Abdomen: soft, NT/ND; no rebound/guarding, no masses palpated, no HSM  : no CVA or suprapubic tenderness  Musculoskeletal: Pain free ROM of the neck. Moving upper and lower extremities and spontaneous in coordinated fashion  Neuro: A&Ox4 (person, place, time, situation), speech fluent, gait not assessed, no focal deficits appreciated, No cerebellar signs. Sensation is grossly intact in the distal upper and lower extremities.  5/5 strength in  and dorsiflexion/plantar flexion of the ankles  Psych: Patient has an appropriate affect and behavior  Skin: No rash or lesions.  No pallor or jaundice.  No cyanosis.  Warm and dry.     DIAGNOSTIC STUDIES / PROCEDURES    EKG  As interpreted by me at 1909: This is a sinus rhythm at a rate of 74, normal RI and QTC, QRS is slightly widened at 219 ms, appears unchanged from prior EKG dated 4/27/2019     LABS  Labs Reviewed   CBC WITH DIFFERENTIAL - Abnormal; Notable for the following components:       Result Value    MCHC 31.3 (*)     All other components within normal limits    Narrative:     Release to patient->Immediate   COMP METABOLIC PANEL    Narrative:     Release to patient->Immediate   TROPONIN    Narrative:     Release to patient->Immediate   ESTIMATED GFR    Narrative:     Release to patient->Immediate "   TROPONIN    Narrative:     Release to patient->Immediate   SARS-COV ANTIGEN JIMMIE    Narrative:     Have you been in close contact with a person who is suspected  or known to be positive for COVID-19 within the last 30 days  (e.g. last seen that person < 30 days ago)->No   SARS-COV-2, PCR (IN-HOUSE)    Narrative:     Have you been in close contact with a person who is suspected  or known to be positive for COVID-19 within the last 30 days  (e.g. last seen that person < 30 days ago)->No     RADIOLOGY  CT-HEAD W/O   Final Result         1.  No acute intracranial abnormality.      DX-CHEST-PORTABLE (1 VIEW)   Final Result         1.  Pulmonary vascular congestion and/or mild pulmonary edema   2.  Cardiomegaly   3.  Atherosclerosis        COURSE & MEDICAL DECISION MAKING  Pertinent Labs & Imaging studies reviewed. (See chart for details)    DDX: Hypertensive intracranial bleed, hypertensive emergency, hypertensive encephalopathy, ACS, Pneumothorax, Aortic dissection, Pneumonia, Myocarditis, Anxiety    MDM    Initial evaluation at 2240:  Patient presents with several chief complaints.  My primary concern is her noncompliance with her hypertension medications, the development of persistent hypertension and headaches with the possibility of small intracranial bleeds.  Patient is consented for emergent CT scan though she was very anxious and required minor sedative prior to going.  Additionally with her developing chest discomfort I am concerned about this hypertensive urgency/emergency and the possibility of endorgan dysfunction regarding her cardiovascular status.  EKG is without acute ischemia, troponin is not acutely elevated and delta troponin is pending.  Pain is primarily substernal and has elements of pressure with radiation to the jaw.  While she has had stress testing in 2019, I believe her current clinical status warrants reevaluation.    Blood pressure was acutely controlled with hydralazine and bridged with  reinitiation of her home medications.  Chest x-ray does show some slight pulmonary vascular congestion cardiomegaly.  She has some chronic edema is difficult to fully appreciate due to her habitus.  With no acute hypoxia I did not initiate diuretics at this time.  This is discussed with the hospitalist who is coming to evaluate the patient for potential admission.  Currently there is no clinical signs of PE, PERC score is not used that the patient does not meet age criteria however pain is not truly pleuritic and likely to be cardiovascular as opposed to pulmonary in origin.  CT scan resulted no acute intracranial bleeds.  ?  HEART SCORE:4    FINAL IMPRESSION  Chest pain  Hypertensive emergency  Cephalalgia  Pulmonary edema    Electronically signed by: Ruben Epstein M.D., 3/17/2021 10:40 PM

## 2021-03-18 NOTE — PROGRESS NOTES
Orders for pt discharge received. Pt d/c to home. Discharge instructions given to pt, IV x 1 removed, tele removed. Pt verbalized understanding of d/c instructions, all questions answered. Pt off unit via wheelchair with this RN and Nurse Apprentice. Pt to follow up with PCP within 1-2 weeks for BP medication renewal and discussion of echocardiogram results.

## 2021-03-18 NOTE — PROGRESS NOTES
Telemetry Shift Summary     Rhythm: SB/SR  HR: 52-60  Ectopy: rPVC, rBigem, touched down to 45    Measurements: 0.20/0.08/0.46    Normal Values  Rhythm: SR  HR:   Measurements: 0.12-0.20/0.08-0.10/0.30-0.52

## 2021-03-18 NOTE — ASSESSMENT & PLAN NOTE
-Significant worsening due to losing insurance and running out of her antihypertensives  -Restart home amlodipine, lisinopril and hydrochlorothiazide  -Place as needed labetalol  -Adjust as needed

## 2021-03-18 NOTE — ASSESSMENT & PLAN NOTE
-Ativan as needed  -Patient no longer has insurance, cost of medications and hospitalization and is also increasing her anxiety

## 2021-03-18 NOTE — H&P
"Hospital Medicine History & Physical Note    Date of Service  3/18/2021    Primary Care Physician  No primary care provider on file.    Consultants  None    Code Status  Full Code    Chief Complaint  Chief Complaint   Patient presents with   • Chest Pain     Reports L sided CP with radiation to L jaw.    • Dizziness     Reports \"hard to keep my balance\".   • Ear Pain     L ear more than R.    • Headache       History of Presenting Illness  55 y.o. female who presented 3/17/2021 with chest pain.  Patient states she was in her usual state of health however she did run out of her blood pressure meds 1 week ago.  Patient was trying to get more however she is also lost her insurance and was told she would need to see a provider before getting new medications.  She states 3 days ago she began feeling less well than normal however she cannot give me any specific symptoms.  She states she is chronically short of breath and this did not change.  She states she did began having some dizziness, occasional headaches but nothing severe.  Tonight while she was at dinner, she noted left-sided chest pain that was a cramping-like sensation without radiation, severe.  Patient states it was intermittent until she arrived here.  I did discuss the case including labs and imaging with the ER physician.    Review of Systems  Review of Systems   Constitutional: Negative for chills, fever and malaise/fatigue.   HENT: Negative for congestion.    Respiratory: Positive for shortness of breath (chronic ). Negative for cough, sputum production and stridor.    Cardiovascular: Positive for chest pain. Negative for palpitations and leg swelling.   Gastrointestinal: Negative for abdominal pain, constipation, diarrhea, nausea and vomiting.   Genitourinary: Negative for dysuria and urgency.   Musculoskeletal: Negative for falls and myalgias.   Neurological: Positive for dizziness and headaches. Negative for tingling, loss of consciousness and " weakness.   Psychiatric/Behavioral: Negative for depression and suicidal ideas. The patient is nervous/anxious.    All other systems reviewed and are negative.      Past Medical History   has a past medical history of HTN (hypertension) (4/27/2019) and Lymph edema.    Surgical History  None    Family History  Coronary artery disease    Social History   reports that she has never smoked. She has never used smokeless tobacco. She reports current alcohol use. She reports that she does not use drugs.    Allergies  No Known Allergies    Medications  Prior to Admission Medications   Prescriptions Last Dose Informant Patient Reported? Taking?   B Complex Vitamins (VITAMIN B COMPLEX) Tab 3/17/2021 at am Patient Yes No   Sig: Take 1 Tab by mouth every day.   GARLIC PO   Yes No   Sig: Take 1 Tab by mouth every day.   Omega-3 Fatty Acids (FISH OIL) 1000 MG Cap capsule 3/17/2021 at am Patient Yes No   Sig: Take 1,000 mg by mouth every day.   amLODIPine (NORVASC) 10 MG Tab 3/10/2021  No No   Sig: Take 1 tablet by mouth every day.   ascorbic acid (ASCORBIC ACID) 500 MG Tab 3/17/2021 at am Patient Yes No   Sig: Take 1,000 mg by mouth every day.   aspirin EC (ECOTRIN) 325 MG Tablet Delayed Response 3/17/2021 at am Patient Yes No   Sig: Take 325 mg by mouth every day.   calcium carbonate (OS-SPIKE 500) 500 MG Tab   Yes No   Sig: Take 500 mg by mouth every day.   cetirizine (ZYRTEC) 10 MG Tab 3/17/2021 at am  No No   Sig: Take 1 Tab by mouth every day.   escitalopram (LEXAPRO) 10 MG Tab   No No   Sig: Take 1 tablet by mouth once daily   fluticasone (FLONASE) 50 MCG/ACT nasal spray 3/16/2021 at am Patient Yes No   Sig: Spray 1 Spray in nose every day.   hydroCHLOROthiazide (HYDRODIURIL) 12.5 MG tablet 3/10/2021  No No   Sig: Take 1 tablet by mouth every day.   lisinopril (PRINIVIL) 20 MG Tab 3/10/2021  No No   Sig: Take 1 tablet by mouth every day.   omeprazole (PRILOSEC) 20 MG delayed-release capsule 3/17/2021 at am  No No   Sig: Take  1 Cap by mouth every day.   therapeutic multivitamin-minerals (THERAGRAN-M) Tab 3/17/2021 at am Patient Yes No   Sig: Take 1 Tab by mouth every day.   vitamin D (CHOLECALCIFEROL) 1000 UNIT Tab 3/17/2021 at am Patient Yes No   Sig: Take 1,000 Units by mouth every day.   vitamin e (VITAMIN E) 400 UNIT Cap   Yes No   Sig: Take 400 Units by mouth every day.      Facility-Administered Medications: None       Physical Exam  Temp:  [36.2 °C (97.1 °F)-36.3 °C (97.3 °F)] 36.2 °C (97.1 °F)  Pulse:  [50-72] 50  Resp:  [16-22] 18  BP: (162-212)/() 191/83  SpO2:  [95 %-98 %] 95 %    Physical Exam  Vitals and nursing note reviewed.   Constitutional:       General: She is not in acute distress.     Appearance: She is well-developed. She is obese. She is not diaphoretic.   HENT:      Head: Normocephalic and atraumatic.      Right Ear: External ear normal.      Left Ear: External ear normal.      Nose: Nose normal. No congestion or rhinorrhea.      Mouth/Throat:      Mouth: Mucous membranes are moist.      Pharynx: No oropharyngeal exudate.   Eyes:      General:         Right eye: No discharge.         Left eye: No discharge.      Extraocular Movements: Extraocular movements intact.   Neck:      Trachea: No tracheal deviation.   Cardiovascular:      Rate and Rhythm: Normal rate and regular rhythm.      Heart sounds: No murmur. No friction rub. No gallop.    Pulmonary:      Effort: Pulmonary effort is normal. No respiratory distress.      Breath sounds: Normal breath sounds. No stridor. No wheezing or rales.   Chest:      Chest wall: No tenderness.   Abdominal:      General: Bowel sounds are normal. There is no distension.      Palpations: Abdomen is soft.      Tenderness: There is no abdominal tenderness.   Musculoskeletal:         General: No tenderness. Normal range of motion.      Cervical back: Normal range of motion and neck supple.      Right lower leg: Edema present.      Left lower leg: Edema present.    Lymphadenopathy:      Cervical: No cervical adenopathy.   Skin:     General: Skin is warm and dry.      Coloration: Skin is not jaundiced.      Findings: No erythema or rash.   Neurological:      General: No focal deficit present.      Mental Status: She is alert and oriented to person, place, and time.      Cranial Nerves: No cranial nerve deficit.   Psychiatric:         Mood and Affect: Mood is anxious.         Behavior: Behavior normal.         Thought Content: Thought content normal.         Judgment: Judgment normal.         Laboratory:  Recent Labs     03/17/21 1916   WBC 9.1   RBC 4.48   HEMOGLOBIN 12.4   HEMATOCRIT 39.6   MCV 88.4   MCH 27.7   MCHC 31.3*   RDW 49.5   PLATELETCT 282   MPV 10.2     Recent Labs     03/17/21 1916   SODIUM 139   POTASSIUM 3.7   CHLORIDE 105   CO2 23   GLUCOSE 99   BUN 12   CREATININE 0.67   CALCIUM 9.2     Recent Labs     03/17/21 1916   ALTSGPT 21   ASTSGOT 21   ALKPHOSPHAT 81   TBILIRUBIN 0.4   GLUCOSE 99         No results for input(s): NTPROBNP in the last 72 hours.      Recent Labs     03/17/21 1916 03/17/21  2312   TROPONINT 7 8       Imaging:  CT-HEAD W/O   Final Result         1.  No acute intracranial abnormality.      DX-CHEST-PORTABLE (1 VIEW)   Final Result         1.  Pulmonary vascular congestion and/or mild pulmonary edema   2.  Cardiomegaly   3.  Atherosclerosis            Assessment/Plan:  I anticipate this patient is appropriate for observation status at this time.    Chest pain- (present on admission)  Assessment & Plan  -I think this is all due to significant hypertension  -Restart her home amlodipine, lisinopril and hydrochlorothiazide  -Patient did have a negative stress test in April 2019, at this point in time I do not think we need a repeat stress test  -I will obtain an echocardiogram and trend troponin  -Monitor patient on telemetry  -She does have chronic lower extremity edema, radiologist read of pulmonary vascular congestion and/or mild  pulmonary edema however with her body habitus this is difficult to ascertain, she states that shortness of breath and edema are chronic  -Hold off on Lasix at this time however if her ejection fraction is reduced this could be a consideration or if her symptoms worsen    Anxiety- (present on admission)  Assessment & Plan  -Ativan as needed  -Patient no longer has insurance, cost of medications and hospitalization and is also increasing her anxiety    Hypertensive urgency- (present on admission)  Assessment & Plan  -Significant worsening due to losing insurance and running out of her antihypertensives  -Restart home amlodipine, lisinopril and hydrochlorothiazide  -Place as needed labetalol  -Adjust as needed    Morbid obesity (HCC)- (present on admission)  Assessment & Plan  -Chronic, does need diet and exercise adjustment at discharge

## 2021-03-18 NOTE — DISCHARGE INSTRUCTIONS
Discharge Instructions    Discharged to home by car with self. Discharged via wheelchair, hospital escort: Yes.  Special equipment needed: Not Applicable    Be sure to schedule a follow-up appointment with your primary care doctor or any specialists as instructed.     Discharge Plan:   Diet Plan: Discussed  Activity Level: Discussed  Confirmed Follow up Appointment: Patient to Call and Schedule Appointment  Confirmed Symptoms Management: Discussed  Medication Reconciliation Updated: Yes  Influenza Vaccine Indication: Indicated: 9 to 64 years of age  Influenza Vaccine Given - only chart on this line when given: Influenza Vaccine Given (See MAR)    I understand that a diet low in cholesterol, fat, and sodium is recommended for good health. Unless I have been given specific instructions below for another diet, I accept this instruction as my diet prescription.   Other diet: As tolerated    Special Instructions: None    · Is patient discharged on Warfarin / Coumadin?   No     Depression / Suicide Risk    As you are discharged from this [facility] facility, it is important to learn how to keep safe from harming yourself.    Recognize the warning signs:  · Abrupt changes in personality, positive or negative- including increase in energy   · Giving away possessions  · Change in eating patterns- significant weight changes-  positive or negative  · Change in sleeping patterns- unable to sleep or sleeping all the time   · Unwillingness or inability to communicate  · Depression  · Unusual sadness, discouragement and loneliness  · Talk of wanting to die  · Neglect of personal appearance   · Rebelliousness- reckless behavior  · Withdrawal from people/activities they love  · Confusion- inability to concentrate     If you or a loved one observes any of these behaviors or has concerns about self-harm, here's what you can do:  · Talk about it- your feelings and reasons for harming yourself  · Remove any means that you might use  to hurt yourself (examples: pills, rope, extension cords, firearm)  · Get professional help from the community (Mental Health, Substance Abuse, psychological counseling)  · Do not be alone:Call your Safe Contact- someone whom you trust who will be there for you.  · Call your local CRISIS HOTLINE 901-1094 or 181-617-0137  · Call your local Children's Mobile Crisis Response Team [place] (826) 647-0845 or www.[employer]  · Call the toll free National Suicide Prevention Hotlines   · National Suicide Prevention Lifeline 304-015-HFCB (2668)  · National Hope Line Network 800-SUICIDE (974-2882)    Influenza Virus Vaccine injection  What is this medicine?  INFLUENZA VIRUS VACCINE (in [facility] SE[facility]) helps to reduce the risk of getting influenza also known as the flu. The vaccine only helps protect you against some strains of the flu.  This medicine may be used for other purposes; ask your health care provider or pharmacist if you have questions.  COMMON BRAND NAME(S): Afluria, Afluria Quadrivalent, Agriflu, Alfuria, FLUAD, Fluarix, Fluarix Quadrivalent, Flublok, Flublok Quadrivalent, FLUCELVAX, Flulaval, Fluvirin, Fluzone, Fluzone High-Dose, Fluzone Intradermal  What should I tell my health care provider before I take this medicine?  They need to know if you have any of these conditions:  · bleeding disorder like hemophilia  · fever or infection  · Guillain-[place] syndrome or other neurological problems  · immune system problems  · infection with the human immunodeficiency virus (HIV) or AIDS  · low blood platelet counts  · multiple sclerosis  · an unusual or allergic reaction to influenza virus vaccine, latex, other medicines, foods, dyes, or preservatives. Different brands of vaccines contain different allergens. Some may contain latex or eggs. Talk to your doctor about your allergies to make sure that you get the right vaccine.  · pregnant or trying to get pregnant  · breast-feeding  How should  I use this medicine?  This vaccine is for injection into a muscle or under the skin. It is given by a health care professional.  A copy of Vaccine Information Statements will be given before each vaccination. Read this sheet carefully each time. The sheet may change frequently.  Talk to your healthcare provider to see which vaccines are right for you. Some vaccines should not be used in all age groups.  Overdosage: If you think you have taken too much of this medicine contact a poison control center or emergency room at once.  NOTE: This medicine is only for you. Do not share this medicine with others.  What if I miss a dose?  This does not apply.  What may interact with this medicine?  · chemotherapy or radiation therapy  · medicines that lower your immune system like etanercept, anakinra, infliximab, and adalimumab  · medicines that treat or prevent blood clots like warfarin  · phenytoin  · steroid medicines like prednisone or cortisone  · theophylline  · vaccines  This list may not describe all possible interactions. Give your health care provider a list of all the medicines, herbs, non-prescription drugs, or dietary supplements you use. Also tell them if you smoke, drink alcohol, or use illegal drugs. Some items may interact with your medicine.  What should I watch for while using this medicine?  Report any side effects that do not go away within 3 days to your doctor or health care professional. Call your health care provider if any unusual symptoms occur within 6 weeks of receiving this vaccine.  You may still catch the flu, but the illness is not usually as bad. You cannot get the flu from the vaccine. The vaccine will not protect against colds or other illnesses that may cause fever. The vaccine is needed every year.  What side effects may I notice from receiving this medicine?  Side effects that you should report to your doctor or health care professional as soon as possible:  · allergic reactions like skin  rash, itching or hives, swelling of the face, lips, or tongue  Side effects that usually do not require medical attention (report to your doctor or health care professional if they continue or are bothersome):  · fever  · headache  · muscle aches and pains  · pain, tenderness, redness, or swelling at the injection site  · tiredness  This list may not describe all possible side effects. Call your doctor for medical advice about side effects. You may report side effects to FDA at 5-181-LXT-1610.  Where should I keep my medicine?  The vaccine will be given by a health care professional in a clinic, pharmacy, doctor's office, or other health care setting. You will not be given vaccine doses to store at home.  NOTE: This sheet is a summary. It may not cover all possible information. If you have questions about this medicine, talk to your doctor, pharmacist, or health care provider.  © 2020 Elsevier/Gold Standard (2019-11-12 08:45:43)      Hypertension, Adult  Hypertension is another name for high blood pressure. High blood pressure forces your heart to work harder to pump blood. This can cause problems over time.  There are two numbers in a blood pressure reading. There is a top number (systolic) over a bottom number (diastolic). It is best to have a blood pressure that is below 120/80. Healthy choices can help lower your blood pressure, or you may need medicine to help lower it.  What are the causes?  The cause of this condition is not known. Some conditions may be related to high blood pressure.  What increases the risk?  · Smoking.  · Having type 2 diabetes mellitus, high cholesterol, or both.  · Not getting enough exercise or physical activity.  · Being overweight.  · Having too much fat, sugar, calories, or salt (sodium) in your diet.  · Drinking too much alcohol.  · Having long-term (chronic) kidney disease.  · Having a family history of high blood pressure.  · Age. Risk increases with age.  · Race. You may be at  higher risk if you are .  · Gender. Men are at higher risk than women before age 45. After age 65, women are at higher risk than men.  · Having obstructive sleep apnea.  · Stress.  What are the signs or symptoms?  · High blood pressure may not cause symptoms. Very high blood pressure (hypertensive crisis) may cause:  ? Headache.  ? Feelings of worry or nervousness (anxiety).  ? Shortness of breath.  ? Nosebleed.  ? A feeling of being sick to your stomach (nausea).  ? Throwing up (vomiting).  ? Changes in how you see.  ? Very bad chest pain.  ? Seizures.  How is this treated?  · This condition is treated by making healthy lifestyle changes, such as:  ? Eating healthy foods.  ? Exercising more.  ? Drinking less alcohol.  · Your health care provider may prescribe medicine if lifestyle changes are not enough to get your blood pressure under control, and if:  ? Your top number is above 130.  ? Your bottom number is above 80.  · Your personal target blood pressure may vary.  Follow these instructions at home:  Eating and drinking    · If told, follow the DASH eating plan. To follow this plan:  ? Fill one half of your plate at each meal with fruits and vegetables.  ? Fill one fourth of your plate at each meal with whole grains. Whole grains include whole-wheat pasta, brown rice, and whole-grain bread.  ? Eat or drink low-fat dairy products, such as skim milk or low-fat yogurt.  ? Fill one fourth of your plate at each meal with low-fat (lean) proteins. Low-fat proteins include fish, chicken without skin, eggs, beans, and tofu.  ? Avoid fatty meat, cured and processed meat, or chicken with skin.  ? Avoid pre-made or processed food.  · Eat less than 1,500 mg of salt each day.  · Do not drink alcohol if:  ? Your doctor tells you not to drink.  ? You are pregnant, may be pregnant, or are planning to become pregnant.  · If you drink alcohol:  ? Limit how much you use to:  § 0-1 drink a day for women.  § 0-2  drinks a day for men.  ? Be aware of how much alcohol is in your drink. In the [place] one drink equals one 12 oz bottle of beer (355 mL), one 5 oz glass of wine (148 mL), or one 1½ oz glass of hard liquor (44 mL).  Lifestyle    · Work with your doctor to stay at a healthy weight or to lose weight. Ask your doctor what the best weight is for you.  · Get at least 30 minutes of exercise most days of the week. This may include walking, swimming, or biking.  · Get at least 30 minutes of exercise that strengthens your muscles (resistance exercise) at least 3 days a week. This may include lifting weights or doing Pilates.  · Do not use any products that contain nicotine or tobacco, such as cigarettes, e-cigarettes, and chewing tobacco. If you need help quitting, ask your doctor.  · Check your blood pressure at home as told by your doctor.  · Keep all follow-up visits as told by your doctor. This is important.  Medicines  · Take over-the-counter and prescription medicines only as told by your doctor. Follow directions carefully.  · Do not skip doses of blood pressure medicine. The medicine does not work as well if you skip doses. Skipping doses also puts you at risk for problems.  · Ask your doctor about side effects or reactions to medicines that you should watch for.  Contact a doctor if you:  · Think you are having a reaction to the medicine you are taking.  · Have headaches that keep coming back (recurring).  · Feel dizzy.  · Have swelling in your ankles.  · Have trouble with your vision.  Get help right away if you:  · Get a very bad headache.  · Start to feel mixed up (confused).  · Feel weak or numb.  · Feel faint.  · Have very bad pain in your:  ? Chest.  ? Belly (abdomen).  · Throw up more than once.  · Have trouble breathing.  Summary  · Hypertension is another name for high blood pressure.  · High blood pressure forces your heart to work harder to pump blood.  · For most people, a normal blood pressure is less  than 120/80.  · Making healthy choices can help lower blood pressure. If your blood pressure does not get lower with healthy choices, you may need to take medicine.  This information is not intended to replace advice given to you by your health care provider. Make sure you discuss any questions you have with your health care provider.  Document Released: 06/05/2009 Document Revised: 08/28/2019 Document Reviewed: 08/28/2019  Elsevier Patient Education © 2020 Elsevier Inc.

## 2021-03-18 NOTE — CARE PLAN
Problem: Communication  Goal: The ability to communicate needs accurately and effectively will improve  Outcome: PROGRESSING AS EXPECTED  Note: Patient uses call light appropriately, communicates all concerns and needs.      Problem: Knowledge Deficit  Goal: Knowledge of disease process/condition, treatment plan, diagnostic tests, and medications will improve  Outcome: PROGRESSING AS EXPECTED  Note: Discussed plan of care with patient including meds, labs, and echocardiogram. Patient agrees with plan and verbalizes understanding.

## 2021-03-18 NOTE — PROGRESS NOTES
3/18/21-   CHRISTIANO Lopez called pt post d/c for intro to [facility] services. Completed SDOH screening and outpatient assessment. Pt states established with PCP and would like to make her own follow up visit when she obtains new insurance from her employment Pt declined assistance with scheduling, [facility] services at this time. Pt mentions good support from boyfriend and daughter. No medical equipment used at home. Pt is confident in ability to manage care post d/c. No issues keeping appointments or financial barriers to care. Pt denies need for resources such as food, transportation or housing. [facility] contact info left with pt and encouraged to reach out if needed. Expressed to the pt the importance of having follow up visit scheduled for medication management. She understood but still wants to wait until insurance kicks in before scheduling.     Community Health Worker Intake  • Social determinates of health intake completed.   • Identified barriers to none.   • Contact information provided to An Rainey. Yes   • Has PCP appointment scheduled for. Pt will self schedule per request.   • Scheduled Food Delivery/Home Visit/Outpatient Visit: Declined   • Accepted/Declined Med's-To-Beds. N/a  • Inpatient/Outpatient assessment completed. Outpatient   • Did the patient receive medications post discharge: Yes    Plan:  D/c pt from [facility] services as all needs met.

## 2021-03-18 NOTE — DISCHARGE SUMMARY
"Discharge Summary    CHIEF COMPLAINT ON ADMISSION  Chief Complaint   Patient presents with   • Chest Pain     Reports L sided CP with radiation to L jaw.    • Dizziness     Reports \"hard to keep my balance\".   • Ear Pain     L ear more than R.    • Headache       Reason for Admission  Chest Pain, Head Pressure     Admission Date  3/17/2021    CODE STATUS  Full Code    HPI & HOSPITAL COURSE  Per notes, \" 55 y.o. female who presented 3/17/2021 with chest pain.  Patient states she was in her usual state of health however she did run out of her blood pressure meds 1 week ago.  Patient was trying to get more however she is also lost her insurance and was told she would need to see a provider before getting new medications.  She states 3 days ago she began feeling less well than normal however she cannot give me any specific symptoms.  She states she is chronically short of breath and this did not change.  She states she did began having some dizziness, occasional headaches but nothing severe.  Tonight while she was at dinner, she noted left-sided chest pain that was a cramping-like sensation without radiation, severe.  Patient states it was intermittent until she arrived here.\"     The patient was admitted for hypertensive urgency and monitored overnight. Home blood pressure medications were restarted and blood pressure returned to parameters within normal limits.  On day of discharge patient had complete resolution of chest pain, blood pressure had normalized.  I did discuss the importance of following up and establishing care with a primary care physician.  Patient states she is currently in between insurances and I referred her on to [facility] and recommended she follow-up with them within 1 to 2 weeks.  I did refill her blood pressure medications.    Therefore, she is discharged in good and stable condition to home with close outpatient follow-up.    The patient recovered much more quickly than " anticipated on admission.    Discharge Date  3/18/2021    FOLLOW UP ITEMS POST DISCHARGE  Follow-up with PCP    DISCHARGE DIAGNOSES  Active Problems:    Morbid obesity (HCC) POA: Yes    Anxiety POA: Yes  Resolved Problems:    Chest pain POA: Yes    Hypertensive urgency POA: Yes      FOLLOW UP  No future appointments.  [address] [id number] [phone]  Schedule an appointment as soon as possible for a visit in 1 week  For blood pressure management       MEDICATIONS ON DISCHARGE     Medication List      CONTINUE taking these medications      Instructions   amLODIPine 10 MG Tabs  Commonly known as: NORVASC   Doctor's comments: Establish with new PCP  Take 1 tablet by mouth every day for 30 days.  Dose: 10 mg     ascorbic acid 500 MG Tabs  Commonly known as: ascorbic acid   Take 1,000 mg by mouth every day.  Dose: 1,000 mg     aspirin  MG Tbec  Commonly known as: ECOTRIN   Take 325 mg by mouth every day.  Dose: 325 mg     fish oil 1000 MG Caps capsule   Take 1,000 mg by mouth every day.  Dose: 1,000 mg     fluticasone 50 MCG/ACT nasal spray  Commonly known as: FLONASE   Spray 1 Spray in nose every day.  Dose: 1 Spray     hydroCHLOROthiazide 12.5 MG tablet  Start taking on: March 19, 2021  Commonly known as: HYDRODIURIL   Take 1 tablet by mouth every day for 30 days.  Dose: 12.5 mg     lisinopril 20 MG Tabs  Commonly known as: PRINIVIL   Doctor's comments: Establish with new PCP  Take 1 tablet by mouth every day.  Dose: 20 mg     therapeutic multivitamin-minerals Tabs   Take 1 Tab by mouth every day.  Dose: 1 tablet     Vitamin B Complex Tabs   Take 1 Tab by mouth every day.  Dose: 1 tablet     vitamin D 1000 Unit (25 mcg) Tabs  Commonly known as: cholecalciferol   Take 1,000 Units by mouth every day.  Dose: 1,000 Units        STOP taking these medications    calcium carbonate 500 MG Tabs  Commonly known as: OS-SPIKE 500     cetirizine 10 MG Tabs  Commonly known  as: ZYRTEC     escitalopram 10 MG Tabs  Commonly known as: Lexapro     GARLIC PO     omeprazole 20 MG delayed-release capsule  Commonly known as: PRILOSEC     vitamin e 400 UNIT Caps  Commonly known as: VITAMIN E            Allergies  No Known Allergies    DIET  Orders Placed This Encounter   Procedures   • Diet Order Diet: Regular     Standing Status:   Standing     Number of Occurrences:   1     Order Specific Question:   Diet:     Answer:   Regular [1]       ACTIVITY  As tolerated.  Weight bearing as tolerated    CONSULTATIONS  None    PROCEDURES  None    LABORATORY  Lab Results   Component Value Date    SODIUM 139 03/17/2021    POTASSIUM 3.7 03/17/2021    CHLORIDE 105 03/17/2021    CO2 23 03/17/2021    GLUCOSE 99 03/17/2021    BUN 12 03/17/2021    CREATININE 0.67 03/17/2021        Lab Results   Component Value Date    WBC 9.1 03/17/2021    HEMOGLOBIN 12.4 03/17/2021    HEMATOCRIT 39.6 03/17/2021    PLATELETCT 282 03/17/2021        Total time of the discharge process exceeds 31 minutes.

## 2021-03-18 NOTE — PROGRESS NOTES
2 RN skin check complete.   Devices in place tele.  Skin assessed under devices intact.  Confirmed pressure ulcers found on n/a.  New potential pressure ulcers noted on n/a. Wound consult placed n/a.  The following interventions in place patient turns self side to side,pillows in use for support/positioning.     Skin WNL, c/d/i

## 2021-03-18 NOTE — ASSESSMENT & PLAN NOTE
-I think this is all due to significant hypertension  -Restart her home amlodipine, lisinopril and hydrochlorothiazide  -Patient did have a negative stress test in April 2019, at this point in time I do not think we need a repeat stress test  -I will obtain an echocardiogram and trend troponin  -Monitor patient on telemetry  -She does have chronic lower extremity edema, radiologist read of pulmonary vascular congestion and/or mild pulmonary edema however with her body habitus this is difficult to ascertain, she states that shortness of breath and edema are chronic  -Hold off on Lasix at this time however if her ejection fraction is reduced this could be a consideration or if her symptoms worsen

## 2021-05-11 ENCOUNTER — PATIENT OUTREACH (OUTPATIENT)
Dept: MEDICAL GROUP | Facility: MEDICAL CENTER | Age: 56
End: 2021-05-11

## 2021-05-11 NOTE — PROGRESS NOTES
NEW PATIENT VISIT PRE-VISIT PLANNING    1.  [facility] Patient is checked in Patient Demographics?Yes    2.  Immunizations were updated in Epic using Reconcile Outside Information activity? Yes         3.  Is this appointment scheduled as a Hospital Follow-Up? No    4.  Patient is due for the following Health Maintenance Topics:   Health Maintenance Due   Topic Date Due   • COVID-19 Vaccine (1) Never done   • PAP SMEAR  Never done   • COLONOSCOPY  Never done   • IMM ZOSTER VACCINES (1 of 2) Never done   • MAMMOGRAM  04/20/2019       5.  Reviewed/Updated the following:  · Preferred Pharmacy? Yes  · Preferred Lab? Yes  · Preferred Communication? Yes  · Allergies? Yes  · Medications? YES. Was Abstract Encounter opened and chart updated? YES  · Social History? Yes  · Family History (document living status of immediate family members and if + hx of  cancer, diabetes, hypertension, hyperlipidemia, heart attack, stroke) No    6.  Updated Care Team?       •   DME Company (gait device, O2, CPAP, etc.) N\A       •   Other Specialists (eye doctor, derm, GYN, cardiology, endo, etc): N\A    7.  AHA (Puls[employer]) form printed for Provider? N/A       Patient NOT contacted for New Patient Pre-Visit Planning, NU2U patient.   -----------------------------------------------------------------------------------------------  This pre-Visit Planning note has been created for the Provider and Medical Assistant to review prior to the patient's office appointment.   Patient is NOT REQUIRED to follow-up on this note.

## 2021-05-18 ENCOUNTER — OFFICE VISIT (OUTPATIENT)
Dept: MEDICAL GROUP | Facility: MEDICAL CENTER | Age: 56
End: 2021-05-18
Payer: COMMERCIAL

## 2021-05-18 VITALS
BODY MASS INDEX: 48.82 KG/M2 | TEMPERATURE: 98.4 F | HEART RATE: 66 BPM | SYSTOLIC BLOOD PRESSURE: 132 MMHG | OXYGEN SATURATION: 98 % | WEIGHT: 293 LBS | DIASTOLIC BLOOD PRESSURE: 72 MMHG | HEIGHT: 65 IN

## 2021-05-18 DIAGNOSIS — B35.1 ONYCHOMYCOSIS OF TOENAIL: ICD-10-CM

## 2021-05-18 DIAGNOSIS — I89.0 LYMPHEDEMA: ICD-10-CM

## 2021-05-18 DIAGNOSIS — F33.41 RECURRENT MAJOR DEPRESSIVE DISORDER, IN PARTIAL REMISSION (HCC): ICD-10-CM

## 2021-05-18 DIAGNOSIS — E66.01 MORBID OBESITY WITH BMI OF 50.0-59.9, ADULT (HCC): ICD-10-CM

## 2021-05-18 DIAGNOSIS — I10 ESSENTIAL HYPERTENSION: ICD-10-CM

## 2021-05-18 DIAGNOSIS — H93.11 TINNITUS OF RIGHT EAR: ICD-10-CM

## 2021-05-18 DIAGNOSIS — E66.01 MORBID OBESITY (HCC): ICD-10-CM

## 2021-05-18 DIAGNOSIS — Z12.31 ENCOUNTER FOR SCREENING MAMMOGRAM FOR BREAST CANCER: ICD-10-CM

## 2021-05-18 DIAGNOSIS — F41.9 ANXIETY: ICD-10-CM

## 2021-05-18 DIAGNOSIS — Z12.11 SCREENING FOR COLON CANCER: ICD-10-CM

## 2021-05-18 PROBLEM — N92.1 MENORRHAGIA WITH IRREGULAR CYCLE: Status: RESOLVED | Noted: 2020-02-04 | Resolved: 2021-05-18

## 2021-05-18 PROCEDURE — 99214 OFFICE O/P EST MOD 30 MIN: CPT | Performed by: PHYSICIAN ASSISTANT

## 2021-05-18 RX ORDER — ESCITALOPRAM OXALATE 10 MG/1
10 TABLET ORAL DAILY
COMMUNITY
End: 2021-06-22

## 2021-05-18 RX ORDER — CETIRIZINE HYDROCHLORIDE 10 MG/1
10 TABLET ORAL DAILY
COMMUNITY
End: 2022-12-06

## 2021-05-18 RX ORDER — HYDROCHLOROTHIAZIDE 12.5 MG/1
12.5 TABLET ORAL DAILY
COMMUNITY
End: 2021-06-22

## 2021-05-18 RX ORDER — AMLODIPINE BESYLATE 10 MG/1
10 TABLET ORAL DAILY
COMMUNITY
End: 2021-05-18 | Stop reason: SDUPTHER

## 2021-05-18 RX ORDER — LISINOPRIL 20 MG/1
20 TABLET ORAL DAILY
Qty: 90 TABLET | Refills: 3 | Status: SHIPPED | OUTPATIENT
Start: 2021-05-18 | End: 2022-06-14

## 2021-05-18 RX ORDER — AMLODIPINE BESYLATE 10 MG/1
10 TABLET ORAL DAILY
Qty: 90 TABLET | Refills: 3 | Status: SHIPPED | OUTPATIENT
Start: 2021-05-18 | End: 2022-06-14

## 2021-05-18 ASSESSMENT — ANXIETY QUESTIONNAIRES
GAD7 TOTAL SCORE: 5
3. WORRYING TOO MUCH ABOUT DIFFERENT THINGS: SEVERAL DAYS
2. NOT BEING ABLE TO STOP OR CONTROL WORRYING: SEVERAL DAYS
1. FEELING NERVOUS, ANXIOUS, OR ON EDGE: SEVERAL DAYS
4. TROUBLE RELAXING: SEVERAL DAYS
5. BEING SO RESTLESS THAT IT IS HARD TO SIT STILL: NOT AT ALL
6. BECOMING EASILY ANNOYED OR IRRITABLE: NOT AT ALL
7. FEELING AFRAID AS IF SOMETHING AWFUL MIGHT HAPPEN: SEVERAL DAYS

## 2021-05-18 ASSESSMENT — PATIENT HEALTH QUESTIONNAIRE - PHQ9
CLINICAL INTERPRETATION OF PHQ2 SCORE: 1
5. POOR APPETITE OR OVEREATING: 1 - SEVERAL DAYS
SUM OF ALL RESPONSES TO PHQ QUESTIONS 1-9: 4

## 2021-05-18 ASSESSMENT — FIBROSIS 4 INDEX: FIB4 SCORE: 0.86

## 2021-05-18 NOTE — ASSESSMENT & PLAN NOTE
Chronic: Patient saw vascular medicine years ago and had ultrasounds that showed patent vessels.  Has struggled with lymphedema.  Compression stockings help.  Standing or having feet in dependent position worsens..  No ulcers or breaks in the skin

## 2021-05-18 NOTE — ASSESSMENT & PLAN NOTE
Chronic: Stable.  Patient has been taking lisinopril 20 mg daily, hydrochlorothiazide 12.5 mg daily and amlodipine 10 mg daily.

## 2021-05-18 NOTE — PROGRESS NOTES
Subjective:   An Rainey is a 55 y.o. female here today for establishing care    Depression  Chronic: Has been on Lexapro 10 mg in the past which helped but patient finds she does not need it on a regular basis and takes it intermittently    HTN (hypertension)  Chronic: Stable.  Patient has been taking lisinopril 20 mg daily, hydrochlorothiazide 12.5 mg daily and amlodipine 10 mg daily.    Lymphedema  Chronic: Patient saw vascular medicine years ago and had ultrasounds that showed patent vessels.  Has struggled with lymphedema.  Compression stockings help.  Standing or having feet in dependent position worsens..  No ulcers or breaks in the skin    Anxiety  Chronic: staying busy helps. Daughter is a stress for patient. Does not want to take meds    Morbid obesity (HCC)  Chronic: has lost 8lbs. Watching what she eats. Does not want intervention with health improvement through [facility]    Tinnitus of right ear  Chronic: Denies loud noise exposure, worse on the right.  Better when she lays on the right side because she cannot hear it.         Current medicines (including changes today)  Current Outpatient Medications   Medication Sig Dispense Refill   • hydroCHLOROthiazide (HYDRODIURIL) 12.5 MG tablet Take 12.5 mg by mouth every day. Indications: High Blood Pressure Disorder     • escitalopram (LEXAPRO) 10 MG Tab Take 10 mg by mouth every day.     • cetirizine (ZYRTEC) 10 MG Tab Take 10 mg by mouth every day. Indications: Hayfever     • amLODIPine (NORVASC) 10 MG Tab Take 1 tablet by mouth every day. Indications: High Blood Pressure Disorder 90 tablet 3   • lisinopril (PRINIVIL) 20 MG Tab Take 1 tablet by mouth every day. 90 tablet 3   • B Complex Vitamins (VITAMIN B COMPLEX) Tab Take 1 Tab by mouth every day.     • vitamin D (CHOLECALCIFEROL) 1000 UNIT Tab Take 1,000 Units by mouth every day.     • therapeutic multivitamin-minerals (THERAGRAN-M) Tab Take 1 Tab by mouth every day.     • aspirin EC (ECOTRIN) 325  "MG Tablet Delayed Response Take 325 mg by mouth every day.     • fluticasone (FLONASE) 50 MCG/ACT nasal spray Spray 1 Spray in nose every day.     • Omega-3 Fatty Acids (FISH OIL) 1000 MG Cap capsule Take 1,000 mg by mouth every day.       No current facility-administered medications for this visit.     She  has a past medical history of Allergy, GERD (gastroesophageal reflux disease), HTN (hypertension) (4/27/2019), Hypertension, and Lymph edema. She also has no past medical history of Asthma, Congestive heart failure (HCC), Liver disease, Stroke (HCC), or Type II or unspecified type diabetes mellitus without mention of complication, not stated as uncontrolled.  Patient has no known allergies.     Social History and Family History were reviewed and updated.    ROS   No headaches, chest pain, no shortness of breath, abdominal pain, nausea, or vomiting.  All other systems were reviewed and are negative or noted as positive in the HPI.       Objective:     /72 (BP Location: Right arm, Patient Position: Sitting)   Pulse 66   Temp 36.9 °C (98.4 °F) (Temporal)   Ht 1.651 m (5' 5\")   Wt (!) 158 kg (349 lb)   SpO2 98%  Body mass index is 58.08 kg/m².     Physical Exam:  Constitutional: ANO x3, no acute distress, well-nourished, well-hydrated   Skin: Warm, dry, good turgor, no rashes in visible areas.  HEENT: Is normocephalic atraumatic, good PERRLA, extraocular movements intact, TMs and oropharynx are clear with good dentition   Neck: Soft and supple, trachea midline, no masses.  No thyroid megaly or cervical lymphadenopathy noted  Cardiovascular: Regular rate and rhythm.  Normal S1 and 2, no murmurs, rubs or gallops.  No edema noted  Lungs: Clear to auscultation bilaterally.  No wheezes, rales or rhonchi.  Good inspiratory and expiratory breath sounds  Abdomen: Soft, non-tender, no masses.  No hepatosplenomegaly.  Negative Nova's  Psych: Alert and oriented x3, normal affect and mood.  Neuro: Cranial nerves II " through XII were assessed and intact.  Normal gait, normal cerebellar function noted  Musculoskeletal: Full range of motion, good strength against resistance    Clinical Course/Lab Analysis:        Assessment and Plan:   The following treatment plan was discussed.  Signs and symptoms for which to return were discussed with patient at length.  Patient verbalized understanding.    1. Encounter for screening mammogram for breast cancer  - MA-SCREENING MAMMO BILAT W/CAD; Future    2. Recurrent major depressive disorder, in partial remission (HCC)  Chronic: Stable  Take escitalopram as directed.  Of note patient does not take it daily    3. Essential hypertension  Chronic stable  - amLODIPine (NORVASC) 10 MG Tab; Take 1 tablet by mouth every day. Indications: High Blood Pressure Disorder  Dispense: 90 tablet; Refill: 3  - lisinopril (PRINIVIL) 20 MG Tab; Take 1 tablet by mouth every day.  Dispense: 90 tablet; Refill: 3  Hold off refilling HCTZ 12.5 mg as patient has not been taking it for a month.  We will have her come back in 1 month to recheck blood pressure and if elevated will reinitiate    4. Screening for colon cancer  - MARIMAR (FIT DNA)    5. Morbid obesity with BMI of 50.0-59.9, adult (HCC)  Chronic: Stable  - Patient identified as having weight management issue.  Appropriate orders and counseling given.  Recommend at length my recommendation for [facility] health improvement program.  Patient defers    6. Lymphedema  Chronic: Stable  Must lose weight.  Wear compression stockings    7. Anxiety  Patient currently doing well.  LINUS-7 Questionnaire    Feeling nervous, anxious, or on edge: Several days  Not being able to sop or control worrying: Several days  Worrying too much about different things: Several days  Trouble relaxing: Several days  Being so restless that it's hard to sit still: Not at all  Becoming easily annoyed or irritable: Not at all  Feeling afraid as if something awful might happen: Several  days  Total: 5    Interpretation of LINUS 7 Total Score   Score Severity :  0-4 No Anxiety   5-9 Mild Anxiety  10-14 Moderate Anxiety  15-21 Severe Anxiety    8. Morbid obesity (HCC)  Chronic: See above    9. Tinnitus of right ear  New: Unstable  Ear exam was unremarkable.  Discussed recommendation to avoid caffeine, decrease salt intake and decrease ibuprofen as these are known triggers for tinnitus.  Also recommend use of white noise to distract offered ENT referral.  Patient deferred    10. Onychomycosis of toenail  Tonic: Unstable  - REFERRAL TO PODIATRY  Pollo my recommendation to do a toe clipping and check liver function and may need Lamisil.  Patient would like to see podiatry      Other orders  - hydroCHLOROthiazide (HYDRODIURIL) 12.5 MG tablet; Take 12.5 mg by mouth every day. Indications: High Blood Pressure Disorder  - escitalopram (LEXAPRO) 10 MG Tab; Take 10 mg by mouth every day.  - cetirizine (ZYRTEC) 10 MG Tab; Take 10 mg by mouth every day. Indications: Hayfever         Followup: 1 month    Please note that this dictation was created using voice recognition software. I have made every reasonable attempt to correct obvious errors, but I expect that there are errors of grammar and possibly content that I did not discover before finalizing the note.

## 2021-05-18 NOTE — ASSESSMENT & PLAN NOTE
Chronic: has lost 8lbs. Watching what she eats. Does not want intervention with health improvement through [facility]

## 2021-05-18 NOTE — ASSESSMENT & PLAN NOTE
Chronic: Denies loud noise exposure, worse on the right.  Better when she lays on the right side because she cannot hear it.

## 2021-05-18 NOTE — ASSESSMENT & PLAN NOTE
Chronic: Has been on Lexapro 10 mg in the past which helped but patient finds she does not need it on a regular basis and takes it intermittently

## 2021-06-22 ENCOUNTER — OFFICE VISIT (OUTPATIENT)
Dept: MEDICAL GROUP | Facility: MEDICAL CENTER | Age: 56
End: 2021-06-22
Payer: COMMERCIAL

## 2021-06-22 VITALS
SYSTOLIC BLOOD PRESSURE: 140 MMHG | BODY MASS INDEX: 48.82 KG/M2 | HEART RATE: 82 BPM | WEIGHT: 293 LBS | TEMPERATURE: 97.2 F | DIASTOLIC BLOOD PRESSURE: 92 MMHG | HEIGHT: 65 IN | OXYGEN SATURATION: 93 %

## 2021-06-22 DIAGNOSIS — F33.41 RECURRENT MAJOR DEPRESSIVE DISORDER, IN PARTIAL REMISSION (HCC): ICD-10-CM

## 2021-06-22 DIAGNOSIS — E66.01 MORBID OBESITY (HCC): ICD-10-CM

## 2021-06-22 DIAGNOSIS — I89.0 LYMPHEDEMA: ICD-10-CM

## 2021-06-22 DIAGNOSIS — F41.9 ANXIETY: ICD-10-CM

## 2021-06-22 DIAGNOSIS — I10 ESSENTIAL HYPERTENSION: ICD-10-CM

## 2021-06-22 PROCEDURE — 99214 OFFICE O/P EST MOD 30 MIN: CPT | Performed by: PHYSICIAN ASSISTANT

## 2021-06-22 ASSESSMENT — FIBROSIS 4 INDEX: FIB4 SCORE: 0.86

## 2021-06-22 NOTE — ASSESSMENT & PLAN NOTE
Chronic condition.  Patient has lost 12 pounds through diet alone.  Is eating when she is hungry rather than bored.  Is also eating more for breakfast and less calorie consumption as the day progresses.

## 2021-06-22 NOTE — PROGRESS NOTES
Subjective:   An Rainey is a 55 y.o. female here today for follow    Depression  Stopped lexapro. Feels better without medication. Stressors have resolved in life    HTN (hypertension)  Chronic: Stable.  Patient has been taking lisinopril 20 mg daily, hydrochlorothiazide 12.5 mg daily and amlodipine 10 mg daily.    Morbid obesity (HCC)  Chronic condition.  Patient has lost 12 pounds through diet alone.  Is eating when she is hungry rather than bored.  Is also eating more for breakfast and less calorie consumption as the day progresses.    Lymphedema  Chronic: Patient saw vascular medicine years ago and had ultrasounds that showed patent vessels.  Has struggled with lymphedema.  Compression stockings help.  Standing or having feet in dependent position worsens..  No ulcers or breaks in the skin    Fluid has improved with loss of weight over the last month.  Not as much redness.  Has not been wearing compression stockings due to heat and they are uncomfortable.  But notes she will wear them when needed    Anxiety  Improved now that patient is not living with daugther and her          Current medicines (including changes today)  Current Outpatient Medications   Medication Sig Dispense Refill   • hydroCHLOROthiazide (HYDRODIURIL) 12.5 MG tablet Take 12.5 mg by mouth every day. Indications: High Blood Pressure Disorder     • cetirizine (ZYRTEC) 10 MG Tab Take 10 mg by mouth every day. Indications: Hayfever     • amLODIPine (NORVASC) 10 MG Tab Take 1 tablet by mouth every day. Indications: High Blood Pressure Disorder 90 tablet 3   • lisinopril (PRINIVIL) 20 MG Tab Take 1 tablet by mouth every day. 90 tablet 3   • B Complex Vitamins (VITAMIN B COMPLEX) Tab Take 1 Tab by mouth every day.     • vitamin D (CHOLECALCIFEROL) 1000 UNIT Tab Take 1,000 Units by mouth every day.     • therapeutic multivitamin-minerals (THERAGRAN-M) Tab Take 1 Tab by mouth every day.     • aspirin EC (ECOTRIN) 325 MG Tablet  "Delayed Response Take 325 mg by mouth every day.     • fluticasone (FLONASE) 50 MCG/ACT nasal spray Spray 1 Spray in nose every day.     • Omega-3 Fatty Acids (FISH OIL) 1000 MG Cap capsule Take 1,000 mg by mouth every day.       No current facility-administered medications for this visit.     She  has a past medical history of Allergy, GERD (gastroesophageal reflux disease), HTN (hypertension) (4/27/2019), Hypertension, and Lymph edema. She also has no past medical history of Asthma, Congestive heart failure (HCC), Liver disease, Stroke (HCC), or Type II or unspecified type diabetes mellitus without mention of complication, not stated as uncontrolled.  Patient has no known allergies.     Social History and Family History were reviewed and updated.    ROS   No headaches, chest pain, no shortness of breath, abdominal pain, nausea, or vomiting.  All other systems were reviewed and are negative or noted as positive in the HPI.       Objective:     /92 (BP Location: Right arm, Patient Position: Sitting)   Pulse 82   Temp 36.2 °C (97.2 °F) (Temporal)   Ht 1.651 m (5' 5\")   Wt (!) 153 kg (337 lb)   SpO2 93%  Body mass index is 56.08 kg/m².     Physical Exam:  Constitutional: ANO x3, no acute distress, well-nourished, well-hydrated   Skin: Warm, dry, good turgor, no rashes in visible areas.  HEENT: Is normocephalic atraumatic, good PERRLA, extraocular movements intact, TMs and oropharynx are clear with good dentition   Neck: Soft and supple, trachea midline, no masses.  No thyroid megaly or cervical lymphadenopathy noted  Cardiovascular: Regular rate and rhythm.  Normal S1 and 2, no murmurs, rubs or gallops.  No edema noted  Lungs: Clear to auscultation bilaterally.  No wheezes, rales or rhonchi.  Good inspiratory and expiratory breath sounds  Abdomen: Soft, non-tender, no masses.  No hepatosplenomegaly.  Negative Nova's  Psych: Alert and oriented x3, normal affect and mood.  Neuro: Cranial nerves II through " XII were assessed and intact.  Normal gait, normal cerebellar function noted  Musculoskeletal: Full range of motion, good strength against resistance    Clinical Course/Lab Analysis:    None today    Assessment and Plan:   The following treatment plan was discussed.  Signs and symptoms for which to return were discussed with patient at length.  Patient verbalized understanding.    1. Recurrent major depressive disorder, in partial remission (HCC)  Chronic: In remission.  Patient is not taking any medications.  Feels better off of SSRI.  Avoiding stressors such as daughter and daughter's .  Has been losing weight which has helped mood    2. Essential hypertension  Chronic: Slightly elevated today.  Is taking lisinopril 20 mg and amlodipine 10 mg daily.  Stopped hydrochlorothiazide and would like to do a trial without it.  This is reasonable but must continue to lose weight.  If blood pressure elevated next visit will likely reinitiate     3. Morbid obesity (HCC)  This is a chronic condition.  Patient though has lost 13 pounds in a month with diet alone.  Has been eating when hungry and not bored.  Has also been eating more for breakfast and less throughout the day.  Pollo the importance of adding exercise to her routine starting by walking 30 minutes a day and drinking only water to avoid soda and other sugary drinks    4. Lymphedema  This is a chronic condition that has improved with some weight loss.  Check for any nonhealing ulcers.  Use cream and compression stockings and drink water    5. Anxiety  This is a chronic condition that is exacerbated by stressors and has improved by patient moving out of living situation with daughter and daughter's .  Pollo coping mechanisms at length    My total time spent caring for the patient on the day of the encounter was 40  minutes.   This does not include time spent on separately billable procedures/tests.    Followup: 1 month for Pap smear please get Cologuard  done    Please note that this dictation was created using voice recognition software. I have made every reasonable attempt to correct obvious errors, but I expect that there are errors of grammar and possibly content that I did not discover before finalizing the note.

## 2021-07-18 ENCOUNTER — HOSPITAL ENCOUNTER (EMERGENCY)
Facility: MEDICAL CENTER | Age: 56
End: 2021-07-18
Attending: EMERGENCY MEDICINE
Payer: COMMERCIAL

## 2021-07-18 VITALS
HEIGHT: 65 IN | HEART RATE: 54 BPM | OXYGEN SATURATION: 94 % | DIASTOLIC BLOOD PRESSURE: 63 MMHG | WEIGHT: 293 LBS | SYSTOLIC BLOOD PRESSURE: 121 MMHG | TEMPERATURE: 98.7 F | RESPIRATION RATE: 19 BRPM | BODY MASS INDEX: 48.82 KG/M2

## 2021-07-18 DIAGNOSIS — S39.012A STRAIN OF LUMBAR REGION, INITIAL ENCOUNTER: ICD-10-CM

## 2021-07-18 PROCEDURE — 99283 EMERGENCY DEPT VISIT LOW MDM: CPT

## 2021-07-18 PROCEDURE — 96372 THER/PROPH/DIAG INJ SC/IM: CPT

## 2021-07-18 PROCEDURE — A9270 NON-COVERED ITEM OR SERVICE: HCPCS | Performed by: EMERGENCY MEDICINE

## 2021-07-18 PROCEDURE — 700111 HCHG RX REV CODE 636 W/ 250 OVERRIDE (IP): Performed by: EMERGENCY MEDICINE

## 2021-07-18 PROCEDURE — 700102 HCHG RX REV CODE 250 W/ 637 OVERRIDE(OP): Performed by: EMERGENCY MEDICINE

## 2021-07-18 RX ORDER — DIAZEPAM 5 MG/1
5 TABLET ORAL ONCE
Status: COMPLETED | OUTPATIENT
Start: 2021-07-18 | End: 2021-07-18

## 2021-07-18 RX ORDER — HYDROMORPHONE HYDROCHLORIDE 1 MG/ML
0.5 INJECTION, SOLUTION INTRAMUSCULAR; INTRAVENOUS; SUBCUTANEOUS ONCE
Status: COMPLETED | OUTPATIENT
Start: 2021-07-18 | End: 2021-07-18

## 2021-07-18 RX ORDER — CYCLOBENZAPRINE HCL 5 MG
5 TABLET ORAL 3 TIMES DAILY PRN
Qty: 30 TABLET | Refills: 0 | Status: ON HOLD | OUTPATIENT
Start: 2021-07-18 | End: 2022-10-11 | Stop reason: SDUPTHER

## 2021-07-18 RX ADMIN — DIAZEPAM 5 MG: 5 TABLET ORAL at 05:09

## 2021-07-18 RX ADMIN — HYDROMORPHONE HYDROCHLORIDE 0.5 MG: 1 INJECTION, SOLUTION INTRAMUSCULAR; INTRAVENOUS; SUBCUTANEOUS at 06:01

## 2021-07-18 ASSESSMENT — PAIN DESCRIPTION - PAIN TYPE: TYPE: ACUTE PAIN

## 2021-07-18 ASSESSMENT — FIBROSIS 4 INDEX: FIB4 SCORE: 0.86

## 2021-07-18 ASSESSMENT — PAIN DESCRIPTION - DESCRIPTORS: DESCRIPTORS: SHOOTING

## 2021-07-18 NOTE — ED PROVIDER NOTES
"ED Provider Note    CHIEF COMPLAINT  Chief Complaint   Patient presents with   • Low Back Pain     started 1500 yesterday; left side; waist level-denies trauma or heavy lifting       HPI  An Rainey is a 55 y.o. female who presents with a chief complaint of low back pain that started yesterday after she bent over and felt a strain on the left side of her back.  The pain is radiating down to her left hip.  It is constant.  She was able to complete her daily activities but the pain worsened tonight and made it difficult for her to sleep which prompted her to come to the ER for evaluation.  She has been trying Aleve without any improvement.  She does have chronic low back pain but is not managed by a physician for this.  She denies any saddle anesthesia, weakness or numbness of her lower extremities, fecal or urinary incontinence, urinary retention, constipation, fevers, IV drug use, recent back injections, or trauma.    REVIEW OF SYSTEMS  See HPI for further details.  Low back pain.  All other systems are negative.     PAST MEDICAL HISTORY   has a past medical history of Allergy, GERD (gastroesophageal reflux disease), HTN (hypertension) (4/27/2019), Hypertension, and Lymph edema.    SOCIAL HISTORY  Social History     Tobacco Use   • Smoking status: Never Smoker   • Smokeless tobacco: Never Used   Vaping Use   • Vaping Use: Never used   Substance and Sexual Activity   • Alcohol use: Yes     Comment: occ   • Drug use: No   • Sexual activity: Not on file       SURGICAL HISTORY  patient denies any surgical history    CURRENT MEDICATIONS  Home Medications    **Home medications have not yet been reviewed for this encounter**         ALLERGIES  No Known Allergies    PHYSICAL EXAM  VITAL SIGNS: /85   Pulse 68   Temp 37.1 °C (98.7 °F) (Temporal)   Resp 19   Ht 1.651 m (5' 5\")   Wt (!) 155 kg (341 lb 11.2 oz)   LMP 04/01/2019 (Approximate)   SpO2 96%   BMI 56.86 kg/m²    Pulse ox interpretation: I " interpret this pulse ox as normal.  Constitutional: Alert in no apparent distress.  HENT: No signs of trauma, Bilateral external ears normal, Nose normal.  Moist mucous membranes.  Eyes: Pupils are equal and reactive, Conjunctiva normal, Non-icteric.   Neck: Normal range of motion, No tenderness, Supple, No stridor.   Cardiovascular: Warm and well perfused.  Thorax & Lungs: No respiratory distress.   Skin: Warm, Dry, No erythema, No rash.   Back: Tenderness along the left lumbar paraspinal musculature that extends towards the bony spine.  There are no lesions or vesicles and no erythema.  No fluctuance or obvious external trauma.  Extremities: Bilateral lower extremity edema.  Musculoskeletal: No major deformities noted.   Neurologic: Alert, Normal motor function, Normal sensory function, No focal deficits noted.   Psychiatric: Affect normal, Judgment normal, Mood normal.     DIAGNOSTIC STUDIES / PROCEDURES    COURSE & MEDICAL DECISION MAKING  Pertinent Labs & Imaging studies reviewed. (See chart for details)  This is a amarjit 55-year-old female who is here with low back pain that started yesterday after bending over.  Arrives afebrile with normal vital signs and is very well-appearing.  She has no red flag symptoms to suggest epidural abscess or hematoma.  No fever, IV drug use, recent epidural injections.  No saddle anesthesia, lower extremity weakness or numbness, fecal/urinary incontinence.  She has no significant bony tenderness.  I have a low suspicion for osteomyelitis, septic facets, or discitis.  No fevers.  She does have tenderness over the left low back paraspinal musculature and notes that it is extending into the hip area.  The radicular nature of the pain suggests a disc compression or possible muscle spasm.  We will trial Valium.    Patient reevaluated at bedside. Valium unfortunately has been unhelpful. She was given a dose of IM dilaudid. I feel additional workup today would be of low clinical value  as she has no other symptoms beyond the back discomfort. I think this is unlikely to represent pyelonephritis, aortic dissection, other infectious etiology. She has used flexeril in the past with improvement in her back pain so I have provided her with a prescription for this. She is ambulatory with a stable gait. Safe for d/c with close outpatient management. I did recommend follow up with a spine/pain management physician. She was discharged in good and stable condition with strict return precautions.    The patient will not drink alcohol nor drive with prescribed medications. The patient will return for worsening symptoms and is stable at the time of discharge. The patient verbalizes understanding and will comply.    FINAL IMPRESSION  1. Strain of lumbar region, initial encounter           Electronically signed by: Ed Vizcaino M.D., 7/18/2021 4:50 AM

## 2021-07-18 NOTE — ED TRIAGE NOTES
"Pt here with c/o    Chief Complaint   Patient presents with   • Low Back Pain     started 1500 yesterday; left side; waist level-denies trauma or heavy lifting       /85   Pulse 68   Temp 37.1 °C (98.7 °F) (Temporal)   Resp 19   Ht 1.651 m (5' 5\")   Wt (!) 155 kg (341 lb 11.2 oz)   LMP 04/01/2019 (Approximate)   SpO2 96%   BMI 56.86 kg/m² pt   "

## 2021-07-18 NOTE — ED NOTES
Pt states pain is getting better.  Pt discharged with instructions and script.  Pt also instructed to not drink or drive while taking flexeril. Pt verbalized understanding of discharge instructions.  Pt ambulated out of ED; boyfriend is driving her home.

## 2021-07-18 NOTE — DISCHARGE INSTRUCTIONS
You were seen in the ER for low back pain. Thankfully, your physical exam is reassuring. You are safe for discharge. I have given you a prescription for Flexeril which is a muscle relaxer, please take it as directed. It will be important to follow-up with your primary care physician as well as a neurosurgeon or back pain specialist. I did give you the contact information for [place] [facility] as above, please call their office tomorrow to make an appointment. Return immediately to the ER with any new or worsening symptoms. Good luck, I hope you feel better soon!

## 2021-07-19 ENCOUNTER — HOSPITAL ENCOUNTER (OUTPATIENT)
Dept: RADIOLOGY | Facility: MEDICAL CENTER | Age: 56
End: 2021-07-19
Attending: PHYSICIAN ASSISTANT
Payer: COMMERCIAL

## 2021-07-19 DIAGNOSIS — Z12.31 ENCOUNTER FOR SCREENING MAMMOGRAM FOR BREAST CANCER: ICD-10-CM

## 2021-07-19 PROCEDURE — 77063 BREAST TOMOSYNTHESIS BI: CPT

## 2021-07-27 ENCOUNTER — HOSPITAL ENCOUNTER (OUTPATIENT)
Facility: MEDICAL CENTER | Age: 56
End: 2021-07-27
Attending: PHYSICIAN ASSISTANT
Payer: COMMERCIAL

## 2021-07-27 ENCOUNTER — OFFICE VISIT (OUTPATIENT)
Dept: MEDICAL GROUP | Facility: MEDICAL CENTER | Age: 56
End: 2021-07-27
Payer: COMMERCIAL

## 2021-07-27 VITALS
HEART RATE: 88 BPM | BODY MASS INDEX: 48.82 KG/M2 | TEMPERATURE: 96.4 F | SYSTOLIC BLOOD PRESSURE: 120 MMHG | WEIGHT: 293 LBS | HEIGHT: 65 IN | DIASTOLIC BLOOD PRESSURE: 76 MMHG | OXYGEN SATURATION: 96 %

## 2021-07-27 DIAGNOSIS — Z12.4 ENCOUNTER FOR PAPANICOLAOU SMEAR FOR CERVICAL CANCER SCREENING: ICD-10-CM

## 2021-07-27 PROCEDURE — 87624 HPV HI-RISK TYP POOLED RSLT: CPT

## 2021-07-27 PROCEDURE — 99396 PREV VISIT EST AGE 40-64: CPT | Performed by: PHYSICIAN ASSISTANT

## 2021-07-27 PROCEDURE — 88175 CYTOPATH C/V AUTO FLUID REDO: CPT

## 2021-07-27 ASSESSMENT — FIBROSIS 4 INDEX: FIB4 SCORE: 0.86

## 2021-07-28 LAB
CYTOLOGY REG CYTOL: NORMAL
HPV HR 12 DNA CVX QL NAA+PROBE: NEGATIVE
HPV16 DNA SPEC QL NAA+PROBE: NEGATIVE
HPV18 DNA SPEC QL NAA+PROBE: NEGATIVE
SPECIMEN SOURCE: NORMAL

## 2021-07-28 NOTE — ASSESSMENT & PLAN NOTE
Pleasant 55-year-old female here for Pap smear.  Has not had Pap for many years.  Is in a monogamous relationship but is not sexually active.  Denies vaginal discharge, foul odor or concern for STD.  Has had 2 pregnancies and 2 living children.  Denies irregular bleeding.  Has had menopause and does not have..  Does not report worsening moods associated with menopause or vasomotor symptoms such as insomnia, hot flashes or vaginal concerns

## 2021-07-28 NOTE — PROGRESS NOTES
Subjective:   An Rainey is a 55 y.o. female here today for pap    Encounter for Papanicolaou smear for cervical cancer screening  Pleasant 55-year-old female here for Pap smear.  Has not had Pap for many years.  Is in a monogamous relationship but is not sexually active.  Denies vaginal discharge, foul odor or concern for STD.  Has had 2 pregnancies and 2 living children.  Denies irregular bleeding.  Has had menopause and does not have..  Does not report worsening moods associated with menopause or vasomotor symptoms such as insomnia, hot flashes or vaginal concerns         Current medicines (including changes today)  Current Outpatient Medications   Medication Sig Dispense Refill   • cyclobenzaprine (FLEXERIL) 5 mg tablet Take 1 tablet by mouth 3 times a day as needed. 30 tablet 0   • cetirizine (ZYRTEC) 10 MG Tab Take 10 mg by mouth every day. Indications: Hayfever     • amLODIPine (NORVASC) 10 MG Tab Take 1 tablet by mouth every day. Indications: High Blood Pressure Disorder 90 tablet 3   • lisinopril (PRINIVIL) 20 MG Tab Take 1 tablet by mouth every day. 90 tablet 3   • B Complex Vitamins (VITAMIN B COMPLEX) Tab Take 1 Tab by mouth every day.     • vitamin D (CHOLECALCIFEROL) 1000 UNIT Tab Take 1,000 Units by mouth every day.     • therapeutic multivitamin-minerals (THERAGRAN-M) Tab Take 1 Tab by mouth every day.     • aspirin EC (ECOTRIN) 325 MG Tablet Delayed Response Take 325 mg by mouth every day.     • fluticasone (FLONASE) 50 MCG/ACT nasal spray Spray 1 Spray in nose every day.     • Omega-3 Fatty Acids (FISH OIL) 1000 MG Cap capsule Take 1,000 mg by mouth every day.       No current facility-administered medications for this visit.     She  has a past medical history of Allergy, GERD (gastroesophageal reflux disease), HTN (hypertension) (4/27/2019), Hypertension, and Lymph edema. She also has no past medical history of Asthma, Congestive heart failure (HCC), Liver disease, Stroke (HCC), or  "Type II or unspecified type diabetes mellitus without mention of complication, not stated as uncontrolled.  Patient has no known allergies.     Social History and Family History were reviewed and updated.    ROS   No headaches, chest pain, no shortness of breath, abdominal pain, nausea, or vomiting.  All other systems were reviewed and are negative or noted as positive in the HPI.       Objective:     /76   Pulse 88   Temp (!) 35.8 °C (96.4 °F) (Temporal)   Ht 1.651 m (5' 5\")   Wt (!) 152 kg (336 lb)   SpO2 96%  Body mass index is 55.91 kg/m².     Physical Exam:  Constitutional: ANO x3, no acute distress, well-nourished, well-hydrated   Skin: Warm, dry, good turgor, no rashes in visible areas.    A chaperone was used during today's exam.          Breast Exam: Performed with instruction during examination. No palpable masses. No axillary lymphadenopathy, no skin changes, no dominant masses. No nipple retraction  Pelvic Exam:  Normal external genitalia with no lesions.  Vaginal vault is normal without increased mucus or discharge. Cervix with no visible lesions. No cervical motion tenderness.  Bleeding did occur when brushing the os. uterus is normal sized with no masses. No adnexal tenderness or enlargement appreciated.  Thin Prep is obtained, vaginal swab is not obtained and specimen(s) sent to lab.        Assessment and Plan:   The following treatment plan was discussed.  Signs and symptoms for which to return were discussed with patient at length.  Patient verbalized understanding.    1. Encounter for Papanicolaou smear for cervical cancer screening  - THINPREP PAP WITH HPV; Future     Patient is up-to-date on mammogram.    Followup: 3 to 6 months for follow up with weight loss associated with morbid obesity    Please note that this dictation was created using voice recognition software. I have made every reasonable attempt to correct obvious errors, but I expect that there are errors of grammar and " possibly content that I did not discover before finalizing the note.

## 2021-08-17 DIAGNOSIS — Z20.822 OCCUPATIONAL EXPOSURE TO COVID-19 VIRUS: ICD-10-CM

## 2021-08-19 ENCOUNTER — APPOINTMENT (OUTPATIENT)
Dept: MEDICAL GROUP | Facility: MEDICAL CENTER | Age: 56
End: 2021-08-19
Payer: COMMERCIAL

## 2021-08-19 ENCOUNTER — HOSPITAL ENCOUNTER (OUTPATIENT)
Facility: MEDICAL CENTER | Age: 56
End: 2021-08-19
Attending: PHYSICIAN ASSISTANT
Payer: COMMERCIAL

## 2021-08-19 ENCOUNTER — NON-PROVIDER VISIT (OUTPATIENT)
Dept: MEDICAL GROUP | Facility: MEDICAL CENTER | Age: 56
End: 2021-08-19
Payer: COMMERCIAL

## 2021-08-19 DIAGNOSIS — Z11.52 ENCOUNTER FOR SCREENING FOR COVID-19: ICD-10-CM

## 2021-08-19 DIAGNOSIS — Z20.822 OCCUPATIONAL EXPOSURE TO COVID-19 VIRUS: ICD-10-CM

## 2021-08-19 LAB — COVID ORDER STATUS COVID19: NORMAL

## 2021-08-19 PROCEDURE — U0003 INFECTIOUS AGENT DETECTION BY NUCLEIC ACID (DNA OR RNA); SEVERE ACUTE RESPIRATORY SYNDROME CORONAVIRUS 2 (SARS-COV-2) (CORONAVIRUS DISEASE [COVID-19]), AMPLIFIED PROBE TECHNIQUE, MAKING USE OF HIGH THROUGHPUT TECHNOLOGIES AS DESCRIBED BY CMS-2020-01-R: HCPCS

## 2021-08-19 PROCEDURE — U0005 INFEC AGEN DETEC AMPLI PROBE: HCPCS

## 2021-08-19 PROCEDURE — 99000 SPECIMEN HANDLING OFFICE-LAB: CPT | Mod: CS | Performed by: PHYSICIAN ASSISTANT

## 2021-08-19 NOTE — PROGRESS NOTES
An Rainey is a 55 y.o. female here for a non-provider visit for PROTOCOL COVID-19 TESTING.    1. Is the patient a provider employed by "[employer]:  No  If the patient is employed by "[employer] STOP. Patient needs to go to [facility].    2. Does the patient have new or worsening symptoms (not explained by alternative diagnosis)? No.  If patient does NOT have any symptoms, STOP.  Patient needs to contact the health department.    3. Authorizing Provider and Cosigner field confirmed to be Dr. Benjamin Munguia: No    4. Was a charge dropped for Specimen Handling? Yes    5. Was a provider who is physically in the office at time of swabbing entered as the billing provider and service provider?  Yes

## 2021-08-20 LAB
SARS-COV-2 RNA RESP QL NAA+PROBE: NOTDETECTED
SPECIMEN SOURCE: NORMAL

## 2022-02-08 ENCOUNTER — HOSPITAL ENCOUNTER (OUTPATIENT)
Facility: MEDICAL CENTER | Age: 57
End: 2022-02-08
Attending: PHYSICIAN ASSISTANT
Payer: COMMERCIAL

## 2022-02-08 ENCOUNTER — OFFICE VISIT (OUTPATIENT)
Dept: MEDICAL GROUP | Facility: MEDICAL CENTER | Age: 57
End: 2022-02-08
Payer: COMMERCIAL

## 2022-02-08 VITALS
BODY MASS INDEX: 48.82 KG/M2 | WEIGHT: 293 LBS | HEIGHT: 65 IN | DIASTOLIC BLOOD PRESSURE: 80 MMHG | OXYGEN SATURATION: 96 % | HEART RATE: 70 BPM | SYSTOLIC BLOOD PRESSURE: 132 MMHG | TEMPERATURE: 96.8 F

## 2022-02-08 DIAGNOSIS — E66.01 MORBID OBESITY WITH BMI OF 50.0-59.9, ADULT (HCC): ICD-10-CM

## 2022-02-08 DIAGNOSIS — I10 PRIMARY HYPERTENSION: ICD-10-CM

## 2022-02-08 DIAGNOSIS — E66.01 MORBID OBESITY (HCC): ICD-10-CM

## 2022-02-08 DIAGNOSIS — L03.116 CELLULITIS OF LEFT LOWER EXTREMITY: ICD-10-CM

## 2022-02-08 PROCEDURE — 87070 CULTURE OTHR SPECIMN AEROBIC: CPT

## 2022-02-08 PROCEDURE — 87077 CULTURE AEROBIC IDENTIFY: CPT | Mod: 91

## 2022-02-08 PROCEDURE — 87205 SMEAR GRAM STAIN: CPT

## 2022-02-08 PROCEDURE — 87186 SC STD MICRODIL/AGAR DIL: CPT | Mod: 91

## 2022-02-08 PROCEDURE — 99214 OFFICE O/P EST MOD 30 MIN: CPT | Performed by: PHYSICIAN ASSISTANT

## 2022-02-08 RX ORDER — SULFAMETHOXAZOLE AND TRIMETHOPRIM 800; 160 MG/1; MG/1
TABLET ORAL
Qty: 20 TABLET | Refills: 0 | Status: SHIPPED | OUTPATIENT
Start: 2022-02-08 | End: 2022-10-10

## 2022-02-08 ASSESSMENT — FIBROSIS 4 INDEX: FIB4 SCORE: 0.88

## 2022-02-08 NOTE — ASSESSMENT & PLAN NOTE
Patient would like referral for weight loss management. Possible gastric sleeve discussion. Does not exercise because of bilateral knee pain

## 2022-02-08 NOTE — ASSESSMENT & PLAN NOTE
Patient has had difficulty with exercise because of bilateral knee pain.  Would like to consider surgical options for weight loss.  Is currently at 342 pounds.  Does try to avoid excess calories by not eating after 6 PM at night.  Has maintained weight for many years.  Has struggled to lose weight for 6 years

## 2022-02-08 NOTE — PROGRESS NOTES
Subjective:   An Rainey is a 56 y.o. female here today for     Morbid obesity (HCC)  Patient would like referral for weight loss management. Possible gastric sleeve discussion. Does not exercise because of bilateral knee pain    HTN (hypertension)  Patient is stable and doing well taking amlodipine 10 mg daily and lisinopril 20 mg daily    Morbid obesity with BMI of 50.0-59.9, adult (HCC)  Patient has had difficulty with exercise because of bilateral knee pain.  Would like to consider surgical options for weight loss.  Is currently at 342 pounds.  Does try to avoid excess calories by not eating after 6 PM at night.  Has maintained weight for many years.  Has struggled to lose weight for 6 years    Cellulitis of left lower extremity  Patient has had longstanding discoloration and swelling of left lower extremity.  Small nonhealing wound.  Has seen vascular surgery and was told she had patent vessels but needed to wear compression stockings secondary to lymphedema         Current medicines (including changes today)  Current Outpatient Medications   Medication Sig Dispense Refill   • sulfamethoxazole-trimethoprim (BACTRIM DS) 800-160 MG tablet Take one pill twice a day for ten days 20 Tablet 0   • cyclobenzaprine (FLEXERIL) 5 mg tablet Take 1 tablet by mouth 3 times a day as needed. 30 tablet 0   • cetirizine (ZYRTEC) 10 MG Tab Take 10 mg by mouth every day. Indications: Hayfever     • amLODIPine (NORVASC) 10 MG Tab Take 1 tablet by mouth every day. Indications: High Blood Pressure Disorder 90 tablet 3   • lisinopril (PRINIVIL) 20 MG Tab Take 1 tablet by mouth every day. 90 tablet 3   • B Complex Vitamins (VITAMIN B COMPLEX) Tab Take 1 Tab by mouth every day.     • vitamin D (CHOLECALCIFEROL) 1000 UNIT Tab Take 1,000 Units by mouth every day.     • therapeutic multivitamin-minerals (THERAGRAN-M) Tab Take 1 Tab by mouth every day.     • aspirin EC (ECOTRIN) 325 MG Tablet Delayed Response Take 325 mg by mouth  "every day.     • fluticasone (FLONASE) 50 MCG/ACT nasal spray Spray 1 Spray in nose every day.     • Omega-3 Fatty Acids (FISH OIL) 1000 MG Cap capsule Take 1,000 mg by mouth every day.       No current facility-administered medications for this visit.     She  has a past medical history of Allergy, GERD (gastroesophageal reflux disease), HTN (hypertension) (4/27/2019), Hypertension, and Lymph edema. She also has no past medical history of Asthma, Congestive heart failure (HCC), Liver disease, Stroke (HCC), or Type II or unspecified type diabetes mellitus without mention of complication, not stated as uncontrolled.  Patient has no known allergies.     Social History and Family History were reviewed and updated.    ROS   No headaches, chest pain, no shortness of breath, abdominal pain, nausea, or vomiting.  All other systems were reviewed and are negative or noted as positive in the HPI.       Objective:     /80   Pulse 70   Temp 36 °C (96.8 °F) (Temporal)   Ht 1.651 m (5' 5\")   Wt (!) 155 kg (342 lb)   SpO2 96%  Body mass index is 56.91 kg/m².     Physical Exam:  Constitutional: ANO x3, no acute distress, well-nourished, well-hydrated   Skin: Lower extremity patient has 3 small papules losing serosanguineous fluid.  No surrounding fluctuance.  They are 2 to 3 mm in diameter  HEENT: Is normocephalic atraumatic, good PERRLA, extraocular movements intact, TMs and oropharynx are clear with good dentition   Neck: Soft and supple, trachea midline, no masses.  No thyroid megaly or cervical lymphadenopathy noted  Cardiovascular: Regular rate and rhythm.  Normal S1 and 2, no murmurs, rubs or gallops.  No edema noted  Lungs: Clear to auscultation bilaterally.  No wheezes, rales or rhonchi.  Good inspiratory and expiratory breath sounds  Abdomen: Soft, non-tender, no masses.  No hepatosplenomegaly.  Negative Nova's  Psych: Alert and oriented x3, normal affect and mood.  Neuro: Cranial nerves II through XII were " assessed and intact.  Normal gait, normal cerebellar function noted  Musculoskeletal: Full range of motion, good strength against resistance    Clinical Course/Lab Analysis:        Assessment and Plan:   The following treatment plan was discussed.  Signs and symptoms for which to return were discussed with patient at length.  Patient verbalized understanding.    1. Cellulitis of left lower extremity  New and unstable  For compression stockings given lymphedema history  - sulfamethoxazole-trimethoprim (BACTRIM DS) 800-160 MG tablet; Take one pill twice a day for ten days  Dispense: 20 Tablet; Refill: 0  - CULTURE WOUND W/ GRAM STAIN; Future    2. Morbid obesity (HCC)  - Referral to Bariatric Surgery    3. Primary hypertension  Chronic: Stable  Continue amlodipine 10 mg daily  Continue lisinopril 20 mg daily    4. Morbid obesity with BMI of 50.0-59.9, adult (HCC)  Chronic and unstable  Referral to Dr. Goodwin weight management      Followup:    Please note that this dictation was created using voice recognition software. I have made every reasonable attempt to correct obvious errors, but I expect that there are errors of grammar and possibly content that I did not discover before finalizing the note.

## 2022-02-08 NOTE — ASSESSMENT & PLAN NOTE
Patient has had longstanding discoloration and swelling of left lower extremity.  Small nonhealing wound.  Has seen vascular surgery and was told she had patent vessels but needed to wear compression stockings secondary to lymphedema

## 2022-02-09 LAB
GRAM STN SPEC: NORMAL
SIGNIFICANT IND 70042: NORMAL
SITE SITE: NORMAL
SOURCE SOURCE: NORMAL

## 2022-02-12 LAB
BACTERIA WND AEROBE CULT: ABNORMAL
GRAM STN SPEC: ABNORMAL
SIGNIFICANT IND 70042: ABNORMAL
SITE SITE: ABNORMAL
SOURCE SOURCE: ABNORMAL

## 2022-10-10 ENCOUNTER — APPOINTMENT (OUTPATIENT)
Dept: CARDIOLOGY | Facility: MEDICAL CENTER | Age: 57
DRG: 309 | End: 2022-10-10
Attending: HOSPITALIST
Payer: COMMERCIAL

## 2022-10-10 ENCOUNTER — APPOINTMENT (OUTPATIENT)
Dept: RADIOLOGY | Facility: MEDICAL CENTER | Age: 57
DRG: 309 | End: 2022-10-10
Attending: EMERGENCY MEDICINE
Payer: COMMERCIAL

## 2022-10-10 ENCOUNTER — HOSPITAL ENCOUNTER (INPATIENT)
Facility: MEDICAL CENTER | Age: 57
LOS: 1 days | DRG: 309 | End: 2022-10-11
Attending: EMERGENCY MEDICINE | Admitting: HOSPITALIST
Payer: COMMERCIAL

## 2022-10-10 DIAGNOSIS — R07.9 CHEST PAIN, UNSPECIFIED TYPE: ICD-10-CM

## 2022-10-10 DIAGNOSIS — I48.91 ATRIAL FIBRILLATION WITH RAPID VENTRICULAR RESPONSE (HCC): ICD-10-CM

## 2022-10-10 DIAGNOSIS — F41.9 ANXIETY: ICD-10-CM

## 2022-10-10 DIAGNOSIS — I10 ESSENTIAL HYPERTENSION: ICD-10-CM

## 2022-10-10 LAB
ALBUMIN SERPL BCP-MCNC: 4.4 G/DL (ref 3.2–4.9)
ALBUMIN/GLOB SERPL: 1.5 G/DL
ALP SERPL-CCNC: 74 U/L (ref 30–99)
ALT SERPL-CCNC: 20 U/L (ref 2–50)
ANION GAP SERPL CALC-SCNC: 10 MMOL/L (ref 7–16)
AST SERPL-CCNC: 22 U/L (ref 12–45)
BASOPHILS # BLD AUTO: 0.5 % (ref 0–1.8)
BASOPHILS # BLD: 0.04 K/UL (ref 0–0.12)
BILIRUB SERPL-MCNC: 0.4 MG/DL (ref 0.1–1.5)
BUN SERPL-MCNC: 11 MG/DL (ref 8–22)
CALCIUM SERPL-MCNC: 9.2 MG/DL (ref 8.4–10.2)
CHLORIDE SERPL-SCNC: 102 MMOL/L (ref 96–112)
CO2 SERPL-SCNC: 25 MMOL/L (ref 20–33)
CREAT SERPL-MCNC: 0.69 MG/DL (ref 0.5–1.4)
EKG IMPRESSION: NORMAL
EOSINOPHIL # BLD AUTO: 0.11 K/UL (ref 0–0.51)
EOSINOPHIL NFR BLD: 1.4 % (ref 0–6.9)
ERYTHROCYTE [DISTWIDTH] IN BLOOD BY AUTOMATED COUNT: 49.6 FL (ref 35.9–50)
GFR SERPLBLD CREATININE-BSD FMLA CKD-EPI: 102 ML/MIN/1.73 M 2
GLOBULIN SER CALC-MCNC: 2.9 G/DL (ref 1.9–3.5)
GLUCOSE SERPL-MCNC: 106 MG/DL (ref 65–99)
HCT VFR BLD AUTO: 42.4 % (ref 37–47)
HGB BLD-MCNC: 13.3 G/DL (ref 12–16)
IMM GRANULOCYTES # BLD AUTO: 0.01 K/UL (ref 0–0.11)
IMM GRANULOCYTES NFR BLD AUTO: 0.1 % (ref 0–0.9)
LV EJECT FRACT  99904: 55
LV EJECT FRACT MOD 4C 99902: 49.96
LYMPHOCYTES # BLD AUTO: 2.44 K/UL (ref 1–4.8)
LYMPHOCYTES NFR BLD: 30.2 % (ref 22–41)
MAGNESIUM SERPL-MCNC: 1.9 MG/DL (ref 1.5–2.5)
MCH RBC QN AUTO: 27.4 PG (ref 27–33)
MCHC RBC AUTO-ENTMCNC: 31.4 G/DL (ref 33.6–35)
MCV RBC AUTO: 87.4 FL (ref 81.4–97.8)
MONOCYTES # BLD AUTO: 0.55 K/UL (ref 0–0.85)
MONOCYTES NFR BLD AUTO: 6.8 % (ref 0–13.4)
NEUTROPHILS # BLD AUTO: 4.92 K/UL (ref 2–7.15)
NEUTROPHILS NFR BLD: 61 % (ref 44–72)
NRBC # BLD AUTO: 0 K/UL
NRBC BLD-RTO: 0 /100 WBC
NT-PROBNP SERPL IA-MCNC: 930 PG/ML (ref 0–125)
PLATELET # BLD AUTO: 355 K/UL (ref 164–446)
PMV BLD AUTO: 10.5 FL (ref 9–12.9)
POTASSIUM SERPL-SCNC: 4.1 MMOL/L (ref 3.6–5.5)
PROT SERPL-MCNC: 7.3 G/DL (ref 6–8.2)
RBC # BLD AUTO: 4.85 M/UL (ref 4.2–5.4)
SODIUM SERPL-SCNC: 137 MMOL/L (ref 135–145)
TROPONIN T SERPL-MCNC: 8 NG/L (ref 6–19)
TSH SERPL DL<=0.005 MIU/L-ACNC: 1.26 UIU/ML (ref 0.38–5.33)
WBC # BLD AUTO: 8.1 K/UL (ref 4.8–10.8)

## 2022-10-10 PROCEDURE — 99223 1ST HOSP IP/OBS HIGH 75: CPT | Performed by: HOSPITALIST

## 2022-10-10 PROCEDURE — 93306 TTE W/DOPPLER COMPLETE: CPT | Mod: 26 | Performed by: INTERNAL MEDICINE

## 2022-10-10 PROCEDURE — 99285 EMERGENCY DEPT VISIT HI MDM: CPT

## 2022-10-10 PROCEDURE — 80053 COMPREHEN METABOLIC PANEL: CPT

## 2022-10-10 PROCEDURE — 700117 HCHG RX CONTRAST REV CODE 255: Performed by: HOSPITALIST

## 2022-10-10 PROCEDURE — 93306 TTE W/DOPPLER COMPLETE: CPT

## 2022-10-10 PROCEDURE — 83880 ASSAY OF NATRIURETIC PEPTIDE: CPT

## 2022-10-10 PROCEDURE — 700102 HCHG RX REV CODE 250 W/ 637 OVERRIDE(OP): Performed by: HOSPITALIST

## 2022-10-10 PROCEDURE — 93005 ELECTROCARDIOGRAM TRACING: CPT

## 2022-10-10 PROCEDURE — 770020 HCHG ROOM/CARE - TELE (206)

## 2022-10-10 PROCEDURE — 93005 ELECTROCARDIOGRAM TRACING: CPT | Performed by: EMERGENCY MEDICINE

## 2022-10-10 PROCEDURE — 96374 THER/PROPH/DIAG INJ IV PUSH: CPT

## 2022-10-10 PROCEDURE — 700111 HCHG RX REV CODE 636 W/ 250 OVERRIDE (IP): Performed by: HOSPITALIST

## 2022-10-10 PROCEDURE — 84484 ASSAY OF TROPONIN QUANT: CPT

## 2022-10-10 PROCEDURE — 83735 ASSAY OF MAGNESIUM: CPT

## 2022-10-10 PROCEDURE — 36415 COLL VENOUS BLD VENIPUNCTURE: CPT

## 2022-10-10 PROCEDURE — 85025 COMPLETE CBC W/AUTO DIFF WBC: CPT

## 2022-10-10 PROCEDURE — 90686 IIV4 VACC NO PRSV 0.5 ML IM: CPT | Performed by: HOSPITALIST

## 2022-10-10 PROCEDURE — 700101 HCHG RX REV CODE 250: Performed by: EMERGENCY MEDICINE

## 2022-10-10 PROCEDURE — 3E02340 INTRODUCTION OF INFLUENZA VACCINE INTO MUSCLE, PERCUTANEOUS APPROACH: ICD-10-PCS | Performed by: HOSPITALIST

## 2022-10-10 PROCEDURE — 71045 X-RAY EXAM CHEST 1 VIEW: CPT

## 2022-10-10 PROCEDURE — 90471 IMMUNIZATION ADMIN: CPT

## 2022-10-10 PROCEDURE — 84443 ASSAY THYROID STIM HORMONE: CPT

## 2022-10-10 PROCEDURE — A9270 NON-COVERED ITEM OR SERVICE: HCPCS | Performed by: HOSPITALIST

## 2022-10-10 RX ORDER — POLYETHYLENE GLYCOL 3350 17 G/17G
1 POWDER, FOR SOLUTION ORAL
Status: DISCONTINUED | OUTPATIENT
Start: 2022-10-10 | End: 2022-10-11 | Stop reason: HOSPADM

## 2022-10-10 RX ORDER — ACETAMINOPHEN 325 MG/1
650 TABLET ORAL EVERY 6 HOURS PRN
Status: DISCONTINUED | OUTPATIENT
Start: 2022-10-10 | End: 2022-10-11 | Stop reason: HOSPADM

## 2022-10-10 RX ORDER — PROCHLORPERAZINE EDISYLATE 5 MG/ML
5-10 INJECTION INTRAMUSCULAR; INTRAVENOUS EVERY 4 HOURS PRN
Status: DISCONTINUED | OUTPATIENT
Start: 2022-10-10 | End: 2022-10-11 | Stop reason: HOSPADM

## 2022-10-10 RX ORDER — BISACODYL 10 MG
10 SUPPOSITORY, RECTAL RECTAL
Status: DISCONTINUED | OUTPATIENT
Start: 2022-10-10 | End: 2022-10-11 | Stop reason: HOSPADM

## 2022-10-10 RX ORDER — ONDANSETRON 2 MG/ML
4 INJECTION INTRAMUSCULAR; INTRAVENOUS EVERY 4 HOURS PRN
Status: DISCONTINUED | OUTPATIENT
Start: 2022-10-10 | End: 2022-10-11 | Stop reason: HOSPADM

## 2022-10-10 RX ORDER — AMOXICILLIN 250 MG
2 CAPSULE ORAL 2 TIMES DAILY
Status: DISCONTINUED | OUTPATIENT
Start: 2022-10-10 | End: 2022-10-11 | Stop reason: HOSPADM

## 2022-10-10 RX ORDER — PROMETHAZINE HYDROCHLORIDE 25 MG/1
12.5-25 SUPPOSITORY RECTAL EVERY 4 HOURS PRN
Status: DISCONTINUED | OUTPATIENT
Start: 2022-10-10 | End: 2022-10-11 | Stop reason: HOSPADM

## 2022-10-10 RX ORDER — CYCLOBENZAPRINE HCL 10 MG
5 TABLET ORAL 3 TIMES DAILY PRN
Status: DISCONTINUED | OUTPATIENT
Start: 2022-10-10 | End: 2022-10-11 | Stop reason: HOSPADM

## 2022-10-10 RX ORDER — PROMETHAZINE HYDROCHLORIDE 25 MG/1
12.5-25 TABLET ORAL EVERY 4 HOURS PRN
Status: DISCONTINUED | OUTPATIENT
Start: 2022-10-10 | End: 2022-10-11 | Stop reason: HOSPADM

## 2022-10-10 RX ORDER — ONDANSETRON 4 MG/1
4 TABLET, ORALLY DISINTEGRATING ORAL EVERY 4 HOURS PRN
Status: DISCONTINUED | OUTPATIENT
Start: 2022-10-10 | End: 2022-10-11 | Stop reason: HOSPADM

## 2022-10-10 RX ORDER — METOPROLOL TARTRATE 1 MG/ML
5 INJECTION, SOLUTION INTRAVENOUS ONCE
Status: COMPLETED | OUTPATIENT
Start: 2022-10-10 | End: 2022-10-10

## 2022-10-10 RX ORDER — ALBUTEROL SULFATE 90 UG/1
2 AEROSOL, METERED RESPIRATORY (INHALATION) EVERY 6 HOURS PRN
COMMUNITY
End: 2023-06-01 | Stop reason: SDUPTHER

## 2022-10-10 RX ADMIN — INFLUENZA A VIRUS A/VICTORIA/2570/2019 IVR-215 (H1N1) ANTIGEN (FORMALDEHYDE INACTIVATED), INFLUENZA A VIRUS A/DARWIN/9/2021 SAN-010 (H3N2) ANTIGEN (FORMALDEHYDE INACTIVATED), INFLUENZA B VIRUS B/PHUKET/3073/2013 ANTIGEN (FORMALDEHYDE INACTIVATED), AND INFLUENZA B VIRUS B/MICHIGAN/01/2021 ANTIGEN (FORMALDEHYDE INACTIVATED) 0.5 ML: 15; 15; 15; 15 INJECTION, SUSPENSION INTRAMUSCULAR at 17:06

## 2022-10-10 RX ADMIN — APIXABAN 5 MG: 5 TABLET, FILM COATED ORAL at 17:06

## 2022-10-10 RX ADMIN — METOPROLOL TARTRATE 25 MG: 25 TABLET, FILM COATED ORAL at 15:23

## 2022-10-10 RX ADMIN — HUMAN ALBUMIN MICROSPHERES AND PERFLUTREN 3 ML: 10; .22 INJECTION, SOLUTION INTRAVENOUS at 16:11

## 2022-10-10 RX ADMIN — METOPROLOL TARTRATE 5 MG: 1 INJECTION, SOLUTION INTRAVENOUS at 14:09

## 2022-10-10 ASSESSMENT — ENCOUNTER SYMPTOMS
NECK PAIN: 0
FOCAL WEAKNESS: 0
VOMITING: 0
SHORTNESS OF BREATH: 1
COUGH: 0
PALPITATIONS: 1
BLURRED VISION: 0
SEIZURES: 0
HEADACHES: 0
CHILLS: 0
SORE THROAT: 0
ABDOMINAL PAIN: 0
EYE REDNESS: 0
FEVER: 0
BACK PAIN: 0

## 2022-10-10 ASSESSMENT — CHA2DS2 SCORE
CHF OR LEFT VENTRICULAR DYSFUNCTION: NO
AGE 65 TO 74: NO
VASCULAR DISEASE: YES
SEX: FEMALE
DIABETES: NO
AGE 75 OR GREATER: NO
CHA2DS2 VASC SCORE: 3
HYPERTENSION: YES
PRIOR STROKE OR TIA OR THROMBOEMBOLISM: NO

## 2022-10-10 ASSESSMENT — COGNITIVE AND FUNCTIONAL STATUS - GENERAL
DAILY ACTIVITIY SCORE: 24
MOBILITY SCORE: 24
SUGGESTED CMS G CODE MODIFIER DAILY ACTIVITY: CH
SUGGESTED CMS G CODE MODIFIER MOBILITY: CH

## 2022-10-10 ASSESSMENT — LIFESTYLE VARIABLES
CONSUMPTION TOTAL: NEGATIVE
ON A TYPICAL DAY WHEN YOU DRINK ALCOHOL HOW MANY DRINKS DO YOU HAVE: 0
AVERAGE NUMBER OF DAYS PER WEEK YOU HAVE A DRINK CONTAINING ALCOHOL: 0
HOW MANY TIMES IN THE PAST YEAR HAVE YOU HAD 5 OR MORE DRINKS IN A DAY: 0
HAVE YOU EVER FELT YOU SHOULD CUT DOWN ON YOUR DRINKING: NO
EVER FELT BAD OR GUILTY ABOUT YOUR DRINKING: NO
ALCOHOL_USE: NO
TOTAL SCORE: 0
TOTAL SCORE: 0
HAVE PEOPLE ANNOYED YOU BY CRITICIZING YOUR DRINKING: NO
EVER HAD A DRINK FIRST THING IN THE MORNING TO STEADY YOUR NERVES TO GET RID OF A HANGOVER: NO
TOTAL SCORE: 0

## 2022-10-10 ASSESSMENT — FIBROSIS 4 INDEX: FIB4 SCORE: 0.89

## 2022-10-10 ASSESSMENT — PAIN DESCRIPTION - PAIN TYPE
TYPE: ACUTE PAIN

## 2022-10-10 NOTE — PROGRESS NOTES
Telemetry Strip     Strip printed: 1521  Measurements from am strip were as follows:  Rhythm: Afib  HR: 90-100s  Measurements: -/ 0.08/ -  Ectopy: none             Normal Values  Rhythm SR  HR Range    Measurements 0.12-0.20 / 0.06-0.10  / 0.30-0.52

## 2022-10-10 NOTE — ED TRIAGE NOTES
"Chief Complaint   Patient presents with    Irregular Heart Beat     Irregular heart beat started 2 weeks ago, was seen at [facility] who dx her w/ afib and possilble MI, CHF.      Shortness of Breath    Chest Pain     Pressure, dull ache pain that starts in left upper chest radiates to shoulder and back.       BP (!) 149/93   Pulse 89   Temp 36.4 °C (97.6 °F) (Temporal)   Resp 18   Ht 1.651 m (5' 5\")   Wt (!) 157 kg (346 lb)   LMP 04/01/2019 (Approximate)   SpO2 95%   BMI 57.58 kg/m²     "

## 2022-10-10 NOTE — ED NOTES
Pt hooked up to cardiac monitor in room. Pt denies any SOB or pain at the moment. Call light within reach.

## 2022-10-10 NOTE — H&P
"Hospital Medicine History & Physical Note    Date of Service  10/10/2022    Primary Care Physician  Monika Dejesus P.A.-C.    Consultants  none    Code Status  Full Code    Chief Complaint  Chief Complaint   Patient presents with    Irregular Heart Beat     Irregular heart beat started 2 weeks ago, was seen at [facility] who dx her w/ afib and possilble MI, CHF.      Shortness of Breath    Chest Pain     Pressure, dull ache pain that starts in left upper chest radiates to shoulder and back.         History of Presenting Illness  An Rainey is a 57 y.o. female who presented 10/10/2022 with chest pain and palpitations.  Ms. Rainey has a past medical history of hypertension as well as chronic lymphedema that has been spirits and palpitations with chest pain for the past 2 weeks.  This has been intermittent.  She states \"my body feels panicked\".  She has been feeling dizzy and quite lightheaded when she experiences these episodes of palpitations as well as short of breath.  When she has palpitations she tends to have left sided chest pain that radiates to left shoulder and she describes it as \"like somebody squeezing\".  Ms. Rainey presented to the emergency room with these complaints here was found to be in new onset atrial fibrillation with rapid ventricular response and is required IV metoprolol 5 mg for rate control.  Her blood pressure is now down to 102 systolic therefore we will start lower dose oral metoprolol 3 times daily and adjust accordingly.  She is amenable to anticoagulation which will be initiated.  Echocardiogram and stress test have been ordered.  I discussed the plan of care with Dr. Hill    Review of Systems  Review of Systems   Constitutional:  Negative for chills and fever.   Respiratory:  Positive for shortness of breath.    Cardiovascular:  Positive for chest pain, palpitations and leg swelling.   All other systems reviewed and are negative.    Past Medical History   has a past " medical history of Allergy, GERD (gastroesophageal reflux disease), HTN (hypertension) (4/27/2019), Hypertension, and Lymph edema.no heart disease.    Surgical History  none     Family History  family history includes Cancer in her father, maternal grandmother, mother, and sister.   Her father may have had a heart attack but she is not sure    Social History   reports that she has never smoked. She has never used smokeless tobacco. She reports current alcohol use. She reports that she does not use drugs.    Allergies  No Known Allergies    Medications  Prior to Admission Medications   Prescriptions Last Dose Informant Patient Reported? Taking?   B Complex Vitamins (VITAMIN B COMPLEX) Tab 10/10/2022 at 0700 Patient Yes No   Sig: Take 1 Tab by mouth every day.   Omega-3 Fatty Acids (FISH OIL) 1000 MG Cap capsule 10/10/2022 at 0700 Patient Yes No   Sig: Take 1,000 mg by mouth every day.   albuterol 108 (90 Base) MCG/ACT Aero Soln inhalation aerosol 10/10/2022 at 1015 Patient Yes Yes   Sig: Inhale 2 Puffs every 6 hours as needed for Shortness of Breath.   amLODIPine (NORVASC) 10 MG Tab 10/10/2022 at 0700 Patient No No   Sig: TAKE 1 TABLET BY MOUTH ONCE DAILY FOR HIGH BLOOD PRESSURE   Patient taking differently: Take 10 mg by mouth every day.   aspirin EC (ECOTRIN) 325 MG Tablet Delayed Response 10/10/2022 at 0700 Patient Yes No   Sig: Take 325 mg by mouth every day.   cetirizine (ZYRTEC) 10 MG Tab 10/10/2022 at 0700 Patient Yes No   Sig: Take 10 mg by mouth every day. Indications: Hayfever   cyclobenzaprine (FLEXERIL) 5 mg tablet > 2 weeks at Unknown Patient No No   Sig: Take 1 tablet by mouth 3 times a day as needed.   Patient taking differently: Take 5 mg by mouth 3 times a day as needed for Muscle Spasms.   fluticasone (FLONASE) 50 MCG/ACT nasal spray 10/10/2022 at 0700 Patient Yes No   Sig: Spray 1 Spray in nose every day.   lisinopril (PRINIVIL) 20 MG Tab 10/10/2022 at 0700 Patient No No   Sig: Take 1 tablet by  mouth once daily   Patient taking differently: Take 20 mg by mouth every day.   therapeutic multivitamin-minerals (THERAGRAN-M) Tab > 2 days at Ran out Patient Yes No   Sig: Take 1 Tab by mouth every day.   vitamin D (CHOLECALCIFEROL) 1000 UNIT Tab 10/10/2022 at 0700 Patient Yes No   Sig: Take 1,000 Units by mouth every day.      Facility-Administered Medications: None       Physical Exam  Temp:  [36.4 °C (97.6 °F)] 36.4 °C (97.6 °F)  Pulse:  [75-89] 75  Resp:  [18-20] 20  BP: (102-149)/(52-93) 102/52  SpO2:  [93 %-95 %] 93 %  Blood Pressure: 102/52   Temperature: 36.4 °C (97.6 °F)   Pulse: 75   Respiration: 20   Pulse Oximetry: 93 %       Physical Exam  Vitals and nursing note reviewed.   Constitutional:       Appearance: She is obese. She is not toxic-appearing.   HENT:      Head: Normocephalic and atraumatic.      Mouth/Throat:      Mouth: Mucous membranes are dry.      Pharynx: Oropharynx is clear.   Eyes:      General: No scleral icterus.     Conjunctiva/sclera: Conjunctivae normal.   Cardiovascular:      Rate and Rhythm: Tachycardia present. Rhythm irregular.      Heart sounds: No murmur heard.  Pulmonary:      Effort: Pulmonary effort is normal.      Breath sounds: Normal breath sounds.   Abdominal:      General: There is no distension.      Tenderness: There is no abdominal tenderness.   Musculoskeletal:      Cervical back: Normal range of motion and neck supple.      Right lower leg: Edema present.      Left lower leg: Edema present.      Comments: Red lesion left shin   Skin:     General: Skin is warm and dry.   Neurological:      General: No focal deficit present.      Mental Status: She is alert and oriented to person, place, and time.   Psychiatric:         Mood and Affect: Mood normal.         Behavior: Behavior normal.         Thought Content: Thought content normal.         Judgment: Judgment normal.       Laboratory:  Recent Labs     10/10/22  1315   WBC 8.1   RBC 4.85   HEMOGLOBIN 13.3   HEMATOCRIT  42.4   MCV 87.4   MCH 27.4   MCHC 31.4*   RDW 49.6   PLATELETCT 355   MPV 10.5     Recent Labs     10/10/22  1315   SODIUM 137   POTASSIUM 4.1   CHLORIDE 102   CO2 25   GLUCOSE 106*   BUN 11   CREATININE 0.69   CALCIUM 9.2     Recent Labs     10/10/22  1315   ALTSGPT 20   ASTSGOT 22   ALKPHOSPHAT 74   TBILIRUBIN 0.4   GLUCOSE 106*         Recent Labs     10/10/22  1315   NTPROBNP 930*         Recent Labs     10/10/22  1315   TROPONINT 8       Imaging:  DX-CHEST-PORTABLE (1 VIEW)   Final Result         1. No acute cardiopulmonary abnormalities are identified.      EC-ECHOCARDIOGRAM COMPLETE W/O CONT    (Results Pending)   NM-CARDIAC STRESS TEST    (Results Pending)       EKG afib with rate 125 no ST nor T wave changes    Assessment/Plan:  Justification for Admission Status  I anticipate this patient will require at least two midnights for appropriate medical management, necessitating inpatient admission because initiation of rate-controlling medications with titration of the meds. Chest pain work up as well.    Patient will need a Telemetry bed on MEDICAL service .  The need is secondary to as above.    * Atrial fibrillation with rapid ventricular response (HCC)- (present on admission)  Assessment & Plan  She has required 5 mg IV metoprolol in the ER  Start scheduled metoprolol for rate control  Check TSH  Continuous tele monitoring  [id number] is at least 2 though await her echocardiogram  We discussed anticoagulation which she is amenable to.  Eliquis 5 mg twice daily will be ordered and if she requires heart catheterization in the future it will need to be held.    Pain in the chest- (present on admission)  Assessment & Plan  Left sided chest pain associated with palpitations and tachycardia in the setting of new onset atrial fibrillation  Cardiac stress test ordered for risk stratification.   Aspirin, beta blocker, lipid panel ordered.      Morbid obesity with BMI of 50.0-59.9, adult (HCC)- (present on  admission)  Assessment & Plan  BMI 57    Lymphedema- (present on admission)  Assessment & Plan  Chronic condition  She is a significant risk of DVT though will be initiated on anticoagulation.     HTN (hypertension)- (present on admission)  Assessment & Plan  Hold her home Norvasc and Lisinopril now that her BP is low after she has been given a beta blocker      VTE prophylaxis: therapeutic anticoagulation with Eliquis

## 2022-10-10 NOTE — ASSESSMENT & PLAN NOTE
Hold her home Norvasc and Lisinopril now that her BP is low after she has been given a beta blocker

## 2022-10-10 NOTE — ED PROVIDER NOTES
ED Provider Note    CHIEF COMPLAINT  Chief Complaint   Patient presents with    Irregular Heart Beat     Irregular heart beat started 2 weeks ago, was seen at [facility] who dx her w/ afib and possilble MI, CHF.      Shortness of Breath    Chest Pain     Pressure, dull ache pain that starts in left upper chest radiates to shoulder and back.         HPI  An Rainey is a 57 y.o. female with past medical history of obesity and hypertension who presents with 2-week history of irregular heartbeat, chest pain, shortness of breath.  Patient states she has noticed these symptoms intermittently over the last 2 weeks.  She describes a pressure pain to her left chest that radiates to the back and arm.  She states that the symptoms are worse with exertion.  She has had some associated mild shortness of breath, no nausea or vomiting.  She has noticed a fast irregular heartbeat possibly over the last 2 weeks however states she is may have had intermittent symptoms like this before that time as well.  No known history of atrial fibrillation.  She has chronic lower extremity swelling that is unchanged.  No history of blood clots.  The patient did take 2 doses of full-strength aspirin today prior to presenting.    REVIEW OF SYSTEMS  See HPI for further details.   Review of Systems   Constitutional:  Negative for chills and fever.   HENT:  Negative for sore throat.    Eyes:  Negative for blurred vision and redness.   Respiratory:  Positive for shortness of breath. Negative for cough.    Cardiovascular:  Positive for chest pain, palpitations and leg swelling.   Gastrointestinal:  Negative for abdominal pain and vomiting.   Genitourinary:  Negative for dysuria and urgency.   Musculoskeletal:  Negative for back pain and neck pain.   Skin:  Negative for rash.   Neurological:  Negative for focal weakness, seizures and headaches.   Psychiatric/Behavioral:  Negative for suicidal ideas.        PAST MEDICAL HISTORY   has a past  "medical history of Allergy, GERD (gastroesophageal reflux disease), HTN (hypertension) (4/27/2019), Hypertension, and Lymph edema.    SOCIAL HISTORY  Social History     Tobacco Use    Smoking status: Never    Smokeless tobacco: Never   Vaping Use    Vaping Use: Never used   Substance and Sexual Activity    Alcohol use: Yes     Comment: occ    Drug use: No    Sexual activity: Not on file       SURGICAL HISTORY  patient denies any surgical history    CURRENT MEDICATIONS  Home Medications       Reviewed by Gogo Talavera (Pharmacy Tech) on 10/10/22 at 1418  Med List Status: Complete     Medication Last Dose Status   albuterol 108 (90 Base) MCG/ACT Aero Soln inhalation aerosol 10/10/2022 Active   amLODIPine (NORVASC) 10 MG Tab 10/10/2022 Active   aspirin EC (ECOTRIN) 325 MG Tablet Delayed Response 10/10/2022 Active   B Complex Vitamins (VITAMIN B COMPLEX) Tab 10/10/2022 Active   cetirizine (ZYRTEC) 10 MG Tab 10/10/2022 Active   cyclobenzaprine (FLEXERIL) 5 mg tablet > 2 weeks Active   fluticasone (FLONASE) 50 MCG/ACT nasal spray 10/10/2022 Active   lisinopril (PRINIVIL) 20 MG Tab 10/10/2022 Active   Omega-3 Fatty Acids (FISH OIL) 1000 MG Cap capsule 10/10/2022 Active   therapeutic multivitamin-minerals (THERAGRAN-M) Tab > 2 days Active   vitamin D (CHOLECALCIFEROL) 1000 UNIT Tab 10/10/2022 Active                    ALLERGIES  No Known Allergies    PHYSICAL EXAM   VITAL SIGNS: /67   Pulse 72   Temp 36.3 °C (97.3 °F) (Oral)   Resp 18   Ht 1.651 m (5' 5\")   Wt (!) 157 kg (346 lb)   LMP 04/01/2019 (Approximate)   SpO2 94%   BMI 57.58 kg/m²      Physical Exam  Constitutional:       General: She is not in acute distress.     Comments: Obese nontoxic-appearing female   HENT:      Head: Normocephalic and atraumatic.   Eyes:      Conjunctiva/sclera: Conjunctivae normal.      Pupils: Pupils are equal, round, and reactive to light.   Cardiovascular:      Rate and Rhythm: Tachycardia present. Rhythm irregular. "      Heart sounds: Normal heart sounds.   Pulmonary:      Effort: Pulmonary effort is normal. No respiratory distress.      Breath sounds: Normal breath sounds.   Abdominal:      General: There is no distension.      Palpations: Abdomen is soft.      Tenderness: There is no abdominal tenderness.   Musculoskeletal:         General: No tenderness. Normal range of motion.      Cervical back: Normal range of motion and neck supple.      Right lower leg: Edema present.      Left lower leg: Edema present.      Comments: Bilateral symmetric lower extremity edema   Skin:     General: Skin is warm and dry.   Neurological:      Mental Status: She is alert and oriented to person, place, and time.      Comments: Moving all extremities spontaneously   Psychiatric:         Mood and Affect: Mood and affect normal.         Behavior: Behavior normal.         DIAGNOSTIC STUDIES    EKG  Results for orders placed or performed during the hospital encounter of 10/10/22   EKG   Result Value Ref Range    Report       [facility]    Test Date:  2022-10-10  Pt Name:    CESARIO BOSS                  Department: [facility]  MRN:        [id number]                      Room:  Gender:     Female                       Technician: [id number]  :        1965                   Requested By:ER TRIAGE PROTOCOL  Order #:    [id number]                    Reading MD: Radha Hill MD    Measurements  Intervals                                Axis  Rate:       125                          P:  MS:                                      QRS:        12  QRSD:       106                          T:          32  QT:         330  QTc:        476    Interpretive Statements  Atrial fibrillation  Borderline low voltage, extremity leads  No ST or T wave change  Compared to ECG 2021 19:09:02  Sinus rhythm no longer present  Electronically Signed On 10- 14:06:12 PDT by Radha Hill MD             LABS  Personally reviewed by  me  Labs Reviewed   CBC WITH DIFFERENTIAL - Abnormal; Notable for the following components:       Result Value    MCHC 31.4 (*)     All other components within normal limits    Narrative:     Biotin intake of greater than 5 mg per day may interfere with  troponin levels, causing false low values.   COMP METABOLIC PANEL - Abnormal; Notable for the following components:    Glucose 106 (*)     All other components within normal limits    Narrative:     Biotin intake of greater than 5 mg per day may interfere with  troponin levels, causing false low values.   PROBRAIN NATRIURETIC PEPTIDE, NT - Abnormal; Notable for the following components:    NT-proBNP 930 (*)     All other components within normal limits   TROPONIN    Narrative:     Biotin intake of greater than 5 mg per day may interfere with  troponin levels, causing false low values.   ESTIMATED GFR    Narrative:     Biotin intake of greater than 5 mg per day may interfere with  troponin levels, causing false low values.   MAGNESIUM   TSH WITH REFLEX TO FT4           RADIOLOGY  Personally reviewed by me  EC-ECHOCARDIOGRAM COMPLETE W/ CONT   Final Result      DX-CHEST-PORTABLE (1 VIEW)   Final Result         1. No acute cardiopulmonary abnormalities are identified.      NM-CARDIAC STRESS TEST    (Results Pending)       ED COURSE  Vitals:    10/10/22 1421 10/10/22 1503 10/10/22 1523 10/10/22 1935   BP: 102/52 112/67 112/67 106/67   Pulse: 75 (!) 54 (!) 101 72   Resp:  19  18   Temp:  37 °C (98.6 °F)  36.3 °C (97.3 °F)   TempSrc:  Oral  Oral   SpO2: 93% 96%  94%   Weight:       Height:             Medications administered:  metoprolol    Old records personally reviewed:  Patient had echocardiogram March 2021 which was normal.  Last stress test was in 2019 and normal        MEDICAL DECISION MAKING  Patient presents with 2 week history of exertional chest pain as well as intermittent palpitations. She is tachycardic on arrival with otherwise normal vitals.  EKG shows  atrial fibrillation which is new for the patient however no other signs of ischemia or arrhthymias.  Initial troponin is normal.  BNP is mildly elevated.  Chest xray without pneumonia or pulmonary edema.  Clinically doubt pulmonary embolism or aortic dissection.  Labs are otherwise reassuring without electrolyte abnormality or thyroid dysfunction. Patient was treated with one dose of IV metoprolol with improvement in rates.  She has a HEART score of 4 therefore will plan to admit for stress  test and echo.    danilo reassessment, patient is resting comfortably with improved vital signs.  No new complaints at this time.  Discussed results with patient and/or family as well as plan of care for admission and patient is agreeable.  I discussed the case with Dr. Costa, hospitalist on call who accepts admission of the patient.     CRITICAL CARE TIME  Upon my evaluation, this patient had a high probability of imminent or life-threatening deterioration due to atrial fibrillation with RVR requiring IV metoprolol which required my direct attention, intervention, and personal management.     I personally provided 34 minutes of total critical care time outside of time spent on separately billable/documented procedures. Time includes: review of laboratory data, review of radiology studies, discussion with consultants, discussion with family/patient, monitoring for potential decompensation.  Interventions were performed as documented above.           IMPRESSION  (R07.9) Chest pain, unspecified type  (I48.91) Atrial fibrillation with rapid ventricular response (HCC)    Disposition: Admit medicine, guarded condition    The patient is referred to a primary physician for blood pressure management, diabetic screening, and for all other preventative health concerns.    Current Discharge Medication List              Electronically signed by: Radha Hill M.D., 10/10/2022 2:06 PM

## 2022-10-10 NOTE — ASSESSMENT & PLAN NOTE
She has required 5 mg IV metoprolol in the ER  Start scheduled metoprolol for rate control  Check TSH  Continuous tele monitoring  [id number] is at least 2 though await her echocardiogram  We discussed anticoagulation which she is amenable to.  Eliquis 5 mg twice daily will be ordered and if she requires heart catheterization in the future it will need to be held.

## 2022-10-11 ENCOUNTER — APPOINTMENT (OUTPATIENT)
Dept: RADIOLOGY | Facility: MEDICAL CENTER | Age: 57
DRG: 309 | End: 2022-10-11
Attending: HOSPITALIST
Payer: COMMERCIAL

## 2022-10-11 ENCOUNTER — APPOINTMENT (OUTPATIENT)
Dept: RADIOLOGY | Facility: MEDICAL CENTER | Age: 57
DRG: 309 | End: 2022-10-11
Attending: INTERNAL MEDICINE
Payer: COMMERCIAL

## 2022-10-11 VITALS
TEMPERATURE: 98.2 F | HEART RATE: 77 BPM | SYSTOLIC BLOOD PRESSURE: 146 MMHG | RESPIRATION RATE: 18 BRPM | DIASTOLIC BLOOD PRESSURE: 98 MMHG | BODY MASS INDEX: 48.82 KG/M2 | HEIGHT: 65 IN | OXYGEN SATURATION: 96 % | WEIGHT: 293 LBS

## 2022-10-11 PROBLEM — R07.9 PAIN IN THE CHEST: Status: RESOLVED | Noted: 2022-10-10 | Resolved: 2022-10-11

## 2022-10-11 LAB
CHOLEST SERPL-MCNC: 167 MG/DL (ref 100–199)
D DIMER PPP IA.FEU-MCNC: 0.74 UG/ML (FEU) (ref 0–0.5)
HDLC SERPL-MCNC: 41 MG/DL
LDLC SERPL CALC-MCNC: 101 MG/DL
TRIGL SERPL-MCNC: 123 MG/DL (ref 0–149)
TROPONIN T SERPL-MCNC: 8 NG/L (ref 6–19)

## 2022-10-11 PROCEDURE — 700117 HCHG RX CONTRAST REV CODE 255: Performed by: INTERNAL MEDICINE

## 2022-10-11 PROCEDURE — 85379 FIBRIN DEGRADATION QUANT: CPT

## 2022-10-11 PROCEDURE — 84484 ASSAY OF TROPONIN QUANT: CPT

## 2022-10-11 PROCEDURE — 94760 N-INVAS EAR/PLS OXIMETRY 1: CPT

## 2022-10-11 PROCEDURE — 80061 LIPID PANEL: CPT

## 2022-10-11 PROCEDURE — 700102 HCHG RX REV CODE 250 W/ 637 OVERRIDE(OP): Performed by: HOSPITALIST

## 2022-10-11 PROCEDURE — 36415 COLL VENOUS BLD VENIPUNCTURE: CPT

## 2022-10-11 PROCEDURE — 99239 HOSP IP/OBS DSCHRG MGMT >30: CPT | Performed by: INTERNAL MEDICINE

## 2022-10-11 PROCEDURE — 71275 CT ANGIOGRAPHY CHEST: CPT

## 2022-10-11 PROCEDURE — A9270 NON-COVERED ITEM OR SERVICE: HCPCS | Performed by: HOSPITALIST

## 2022-10-11 RX ORDER — CYCLOBENZAPRINE HCL 5 MG
5 TABLET ORAL 3 TIMES DAILY PRN
Qty: 30 TABLET | Refills: 0 | Status: SHIPPED | OUTPATIENT
Start: 2022-10-11 | End: 2022-10-13

## 2022-10-11 RX ORDER — AMINOPHYLLINE 25 MG/ML
100 INJECTION, SOLUTION INTRAVENOUS
Status: DISCONTINUED | OUTPATIENT
Start: 2022-10-11 | End: 2022-10-11 | Stop reason: HOSPADM

## 2022-10-11 RX ORDER — REGADENOSON 0.08 MG/ML
0.4 INJECTION, SOLUTION INTRAVENOUS ONCE
Status: DISCONTINUED | OUTPATIENT
Start: 2022-10-11 | End: 2022-10-11 | Stop reason: HOSPADM

## 2022-10-11 RX ORDER — AMLODIPINE BESYLATE 10 MG/1
10 TABLET ORAL DAILY
Qty: 30 TABLET | Refills: 0 | Status: SHIPPED | OUTPATIENT
Start: 2022-10-11 | End: 2022-11-10

## 2022-10-11 RX ADMIN — METOPROLOL TARTRATE 25 MG: 25 TABLET, FILM COATED ORAL at 06:26

## 2022-10-11 RX ADMIN — ASPIRIN 81 MG: 81 TABLET, COATED ORAL at 06:26

## 2022-10-11 RX ADMIN — METOPROLOL TARTRATE 25 MG: 25 TABLET, FILM COATED ORAL at 14:21

## 2022-10-11 RX ADMIN — IOHEXOL 65 ML: 350 INJECTION, SOLUTION INTRAVENOUS at 16:38

## 2022-10-11 RX ADMIN — APIXABAN 5 MG: 5 TABLET, FILM COATED ORAL at 17:53

## 2022-10-11 RX ADMIN — APIXABAN 5 MG: 5 TABLET, FILM COATED ORAL at 06:26

## 2022-10-11 ASSESSMENT — PAIN DESCRIPTION - PAIN TYPE: TYPE: ACUTE PAIN

## 2022-10-11 NOTE — DISCHARGE PLANNING
Case Management Discharge Planning    Admission Date: 10/10/2022  GMLOS: 2.4  ALOS: 1    6-Clicks ADL Score: 24  6-Clicks Mobility Score: 24      Anticipated Discharge Dispo: Discharge Disposition: Discharged to home/self care (01)      Action(s) Taken: RNCM received message from Dr. Sanchez regarding coverage for Eliquis. RNCM called pharmacy and meds are covered.     Escalations Completed: None    Medically Clear: Yes    Next Steps: RNCM notified  that meds are covered. Patient is being discharged today and will  meds.     Barriers to Discharge: None    Is the patient up for discharge tomorrow: Patient is due to discharge today.

## 2022-10-11 NOTE — PROGRESS NOTES
Discharge order written. IV removed, patient tolerated. All personal belongings are in possession. AVS printed, reviewed and copy signed and placed on the chart. Patient has no further questions. [facility].Pt left unit with self via wheelchair. Staff escort  ***.

## 2022-10-11 NOTE — CARE PLAN
Problem: Knowledge Deficit - Standard  Goal: Patient and family/care givers will demonstrate understanding of plan of care, disease process/condition, diagnostic tests and medications  Outcome: Progressing     Problem: Pain - Standard  Goal: Alleviation of pain or a reduction in pain to the patient’s comfort goal  Outcome: Progressing   The patient is Stable - Low risk of patient condition declining or worsening    Shift Goals  Clinical Goals: stress test  Patient Goals: go home  Family Goals: N/A    Progress made toward(s) clinical / shift goals:  updated pt on plan of care, plan for stress test this AM. Pt denies chest pain at this time. Will continue to monitor.     Patient is not progressing towards the following goals:

## 2022-10-11 NOTE — DIETARY
NUTRITION SERVICES: BMI - Pt with BMI >40 (=Body mass index is 57.58 kg/m².), Class III obesity. Wt measured via stand up scale per flowsheets. Weight loss counseling not appropriate in acute care setting.     RECOMMEND - If appropriate at DC please refer to outpatient nutrition services for weight management.

## 2022-10-11 NOTE — DISCHARGE SUMMARY
"Discharge Summary    CHIEF COMPLAINT ON ADMISSION  Chief Complaint   Patient presents with    Irregular Heart Beat     Irregular heart beat started 2 weeks ago, was seen at [facility] who dx her w/ afib and possilble MI, CHF.      Shortness of Breath    Chest Pain     Pressure, dull ache pain that starts in left upper chest radiates to shoulder and back.         Reason for Admission  Irregular Heart Beat     Admission Date  10/10/2022    CODE STATUS  Full Code    HPI & HOSPITAL COURSE  Per notes, \"57 y.o. female who presented 10/10/2022 with chest pain and palpitations.  Ms. Rainey has a past medical history of hypertension as well as chronic lymphedema that has been spirits and palpitations with chest pain for the past 2 weeks.  This has been intermittent.  She states \"my body feels panicked\".  She has been feeling dizzy and quite lightheaded when she experiences these episodes of palpitations as well as short of breath.  When she has palpitations she tends to have left sided chest pain that radiates to left shoulder and she describes it as \"like somebody squeezing\".  Ms. Rainey presented to the emergency room with these complaints here was found to be in new onset atrial fibrillation with rapid ventricular response and is required IV metoprolol 5 mg for rate control.  Her blood pressure is now down to 102 systolic therefore we will start lower dose oral metoprolol 3 times daily and adjust accordingly.  She is amenable to anticoagulation which will be initiated.  Echocardiogram and stress test have been ordered.  I discussed the plan of care with Dr. Hill\"    Patient was admitted with palpitations.  I discussed patient's symptoms this morning with patient and she states she had palpitations, but denied any chest pain.  She was started on metoprolol and Eliquis, with good response.  Symptoms were less likely due to atrial fibrillation, which is now rate controlled.  Patient did have an echocardiogram which was " suggestive of PE, however her D-dimer was slightly elevated. She underwent a CTA out of abundance of precaution, which was negative for PE. She had significant improvement overall symptoms.  Patient had complete resolution of all symptoms, palpitations, denied any chest pain or shortness of breath on the morning of discharge.  I recommend she return to ED immediately switching experience chest pain and also follow-up with PCP for monitoring in the outpatient setting, patient was in agreement with this plan.    Therefore, she is discharged in good and stable condition to home with close outpatient follow-up.    The patient recovered much more quickly than anticipated on admission.    Discharge Date  10/11/2022    FOLLOW UP ITEMS POST DISCHARGE  FU with PCP    DISCHARGE DIAGNOSES  Principal Problem:    Atrial fibrillation with rapid ventricular response (HCC) POA: Yes  Active Problems:    HTN (hypertension) POA: Yes      Overview: 2/8/22: stable on lisinopril 20mg and amlodipine 10mg daily.    Lymphedema POA: Yes    Morbid obesity with BMI of 50.0-59.9, adult (HCC) POA: Yes  Resolved Problems:    Pain in the chest POA: Yes      FOLLOW UP  Future Appointments   Date Time Provider Department Center   10/13/2022 10:00 AM Monika Dejesus P.A.-C. [facility]     Monika Dejesus P.A.-C.  [address] [facility] [address]  [facility] [id number] [phone]    Schedule an appointment as soon as possible for a visit in 1 week(s)  follow up from hospitalization      MEDICATIONS ON DISCHARGE     Medication List        START taking these medications        Instructions   apixaban 5mg Tabs  Commonly known as: ELIQUIS   Take 1 Tablet by mouth 2 times a day for 30 days. Indications: Thromboembolism secondary to Atrial Fibrillation  Dose: 5 mg     metoprolol tartrate 25 MG Tabs  Commonly known as: LOPRESSOR   Take 1 Tablet by mouth every 8 hours for 30 days.  Dose: 25 mg            CHANGE how you take these medications         Instructions   amLODIPine 10 MG Tabs  What changed: See the new instructions.  Commonly known as: NORVASC   Take 0.5 Tablets by mouth every day for 30 days.  Dose: 5 mg            CONTINUE taking these medications        Instructions   albuterol 108 (90 Base) MCG/ACT Aers inhalation aerosol   Inhale 2 Puffs every 6 hours as needed for Shortness of Breath.  Dose: 2 Puff     aspirin  MG Tbec  Commonly known as: ECOTRIN   Take 325 mg by mouth every day.  Dose: 325 mg     cetirizine 10 MG Tabs  Commonly known as: ZYRTEC   Take 10 mg by mouth every day. Indications: Hayfever  Dose: 10 mg     cyclobenzaprine 5 mg tablet  Commonly known as: Flexeril   Take 1 Tablet by mouth 3 times a day as needed for Muscle Spasms for up to 30 days.  Dose: 5 mg     fish oil 1000 MG Caps capsule   Take 1,000 mg by mouth every day.  Dose: 1,000 mg     fluticasone 50 MCG/ACT nasal spray  Commonly known as: FLONASE   Spray 1 Spray in nose every day.  Dose: 1 Spray     lisinopril 20 MG Tabs  Commonly known as: PRINIVIL   Take 1 tablet by mouth once daily     therapeutic multivitamin-minerals Tabs   Take 1 Tab by mouth every day.  Dose: 1 Tablet     Vitamin B Complex Tabs   Take 1 Tab by mouth every day.  Dose: 1 Tablet     vitamin D 1000 Unit (25 mcg) Tabs  Commonly known as: cholecalciferol   Take 1,000 Units by mouth every day.  Dose: 1,000 Units              Allergies  No Known Allergies    DIET  Orders Placed This Encounter   Procedures    Diet Order Diet: Cardiac     Standing Status:   Standing     Number of Occurrences:   1     Order Specific Question:   Diet:     Answer:   Cardiac [6]       ACTIVITY  As tolerated.  Weight bearing as tolerated    CONSULTATIONS  None    PROCEDURES  None    LABORATORY  Lab Results   Component Value Date    SODIUM 137 10/10/2022    POTASSIUM 4.1 10/10/2022    CHLORIDE 102 10/10/2022    CO2 25 10/10/2022    GLUCOSE 106 (H) 10/10/2022    BUN 11 10/10/2022    CREATININE 0.69 10/10/2022        Lab Results    Component Value Date    WBC 8.1 10/10/2022    HEMOGLOBIN 13.3 10/10/2022    HEMATOCRIT 42.4 10/10/2022    PLATELETCT 355 10/10/2022        Total time of the discharge process exceeds 45 minutes.

## 2022-10-11 NOTE — PROGRESS NOTES
Telemetry Shift Summary    Rhythm: A fib  HR Range: 70-84  Ectopy: r-o PVC   Measurements: -/0.08/-

## 2022-10-11 NOTE — CARE PLAN
The patient is Stable - Low risk of patient condition declining or worsening    Shift Goals  Clinical Goals: stress test tomorrow  Patient Goals: rest  Family Goals: N/A    Progress made toward(s) clinical / shift goals:    Problem: Knowledge Deficit - Standard  Goal: Patient and family/care givers will demonstrate understanding of plan of care, disease process/condition, diagnostic tests and medications  Outcome: Progressing     Problem: Pain - Standard  Goal: Alleviation of pain or a reduction in pain to the patient’s comfort goal  Outcome: Progressing    Patient is not progressing towards the following goals:

## 2022-10-11 NOTE — DISCHARGE INSTRUCTIONS
Discharge Instructions    Discharged to home by car with relative. Discharged via wheelchair, hospital escort: Yes.  Special equipment needed: Not Applicable    Be sure to schedule a follow-up appointment with your primary care doctor or any specialists as instructed.     Discharge Plan:   Diet Plan: Discussed  Activity Level: Discussed  Confirmed Follow up Appointment: Patient to Call and Schedule Appointment  Confirmed Symptoms Management: Discussed  Medication Reconciliation Updated: Yes  Influenza Vaccine Indication: Indicated: 9 to 64 years of age  Influenza Vaccine Given - only chart on this line when given: Influenza Vaccine Given (See MAR)    I understand that a diet low in cholesterol, fat, and sodium is recommended for good health. Unless I have been given specific instructions below for another diet, I accept this instruction as my diet prescription.   Other diet: regular    Special Instructions: None    -Is this patient being discharged with medication to prevent blood clots?  No    Is patient discharged on Warfarin / Coumadin?   No

## 2022-10-11 NOTE — PROGRESS NOTES
Telemetry Strip     Strip printed: 109  Measurements from am strip were as follows:  Rhythm: Afib  HR: 73-90  Measurements: -/ 0.08/ -  Ectopy: o-r PVC, r coup             Normal Values  Rhythm SR  HR Range    Measurements 0.12-0.20 / 0.06-0.10  / 0.30-0.52

## 2022-10-13 ENCOUNTER — OFFICE VISIT (OUTPATIENT)
Dept: MEDICAL GROUP | Facility: MEDICAL CENTER | Age: 57
End: 2022-10-13
Payer: COMMERCIAL

## 2022-10-13 VITALS
HEART RATE: 80 BPM | WEIGHT: 293 LBS | DIASTOLIC BLOOD PRESSURE: 89 MMHG | BODY MASS INDEX: 48.82 KG/M2 | TEMPERATURE: 97.1 F | HEIGHT: 65 IN | OXYGEN SATURATION: 95 % | SYSTOLIC BLOOD PRESSURE: 136 MMHG

## 2022-10-13 DIAGNOSIS — I10 PRIMARY HYPERTENSION: ICD-10-CM

## 2022-10-13 DIAGNOSIS — Z12.31 ENCOUNTER FOR SCREENING MAMMOGRAM FOR BREAST CANCER: ICD-10-CM

## 2022-10-13 DIAGNOSIS — I48.91 ATRIAL FIBRILLATION, UNSPECIFIED TYPE (HCC): ICD-10-CM

## 2022-10-13 PROCEDURE — 99214 OFFICE O/P EST MOD 30 MIN: CPT | Performed by: PHYSICIAN ASSISTANT

## 2022-10-13 ASSESSMENT — FIBROSIS 4 INDEX: FIB4 SCORE: 0.76

## 2022-10-13 NOTE — PROGRESS NOTES
Subjective:   An Rainey is a 57 y.o. female here today for     HPI:Patient is here to discuss: Hospital visit follow-up    October 10, 2022: Patient went to the emergency room with chest pain and palpitations.  Was found to be in atrial fibrillation.  Echocardiogram showed ejection fraction of 55%.  CTA of the chest was done to rule out PE and it was negative.  Patient sent home on Eliquis 5 mg twice daily as well as metoprolol.       Current medicines (including changes today)  Current Outpatient Medications   Medication Sig Dispense Refill    amLODIPine (NORVASC) 10 MG Tab Take 1 Tablet by mouth every day for 30 days. 30 Tablet 0    apixaban (ELIQUIS) 5mg Tab Take 1 Tablet by mouth 2 times a day for 30 days. Indications: Thromboembolism secondary to Atrial Fibrillation 60 Tablet 0    metoprolol tartrate (LOPRESSOR) 25 MG Tab Take 1 Tablet by mouth every 8 hours for 30 days. 90 Tablet 0    albuterol 108 (90 Base) MCG/ACT Aero Soln inhalation aerosol Inhale 2 Puffs every 6 hours as needed for Shortness of Breath.      lisinopril (PRINIVIL) 20 MG Tab Take 1 tablet by mouth once daily (Patient taking differently: Take 20 mg by mouth every day.) 90 Tablet 1    cetirizine (ZYRTEC) 10 MG Tab Take 10 mg by mouth every day. Indications: Hayfever      vitamin D (CHOLECALCIFEROL) 1000 UNIT Tab Take 1,000 Units by mouth every day.      aspirin EC (ECOTRIN) 325 MG Tablet Delayed Response Take 325 mg by mouth every day.      fluticasone (FLONASE) 50 MCG/ACT nasal spray Spray 1 Spray in nose every day.       No current facility-administered medications for this visit.     She  has a past medical history of Allergy, GERD (gastroesophageal reflux disease), HTN (hypertension) (4/27/2019), Hypertension, and Lymph edema.    She has no past medical history of Asthma, Congestive heart failure (HCC), Liver disease, Stroke (HCC), or Type II or unspecified type diabetes mellitus without mention of complication, not stated as  "uncontrolled.  Patient has no known allergies.     Social History and Family History were reviewed and updated.    ROS   No headaches, chest pain, no shortness of breath, abdominal pain, nausea, or vomiting.  All other systems were reviewed and are negative or noted as positive in the HPI.       Objective:     /89   Pulse 80   Temp 36.2 °C (97.1 °F) (Temporal)   Ht 1.651 m (5' 5\")   Wt (!) 155 kg (342 lb 3.2 oz)   SpO2 95%  Body mass index is 56.95 kg/m².     Physical Exam:  General: Patient appears well-nourished, well-hydrated, nontoxic  HEENT, normocephalic atraumatic, PERRLA, extraocular movements intact, nares are patent and clear  Neck: No visible masses, thyromegaly or abnormalities noted  Cardiovascular.  Sitting comfortably without visible signs of edema  Lungs: No cyanosis noted, nondyspneic  Skin: Well perfused without evidence of rash or lesions  Neurological: Cranial nerves II through XII intact, normal gait  Musculoskeletal: Normal range of motion, normal strength and no deficit noted     Clinical Course/Lab Analysis:     Latest Reference Range & Units 10/10/22 13:15 10/11/22 02:13   WBC 4.8 - 10.8 K/uL 8.1    RBC 4.20 - 5.40 M/uL 4.85    Hemoglobin 12.0 - 16.0 g/dL 13.3    Hematocrit 37.0 - 47.0 % 42.4    MCV 81.4 - 97.8 fL 87.4    MCH 27.0 - 33.0 pg 27.4    MCHC 33.6 - 35.0 g/dL 31.4 (L)    RDW 35.9 - 50.0 fL 49.6    Platelet Count 164 - 446 K/uL 355    MPV 9.0 - 12.9 fL 10.5    Neutrophils-Polys 44.00 - 72.00 % 61.00    Neutrophils (Absolute) 2.00 - 7.15 K/uL 4.92    Lymphocytes 22.00 - 41.00 % 30.20    Lymphs (Absolute) 1.00 - 4.80 K/uL 2.44    Monocytes 0.00 - 13.40 % 6.80    Monos (Absolute) 0.00 - 0.85 K/uL 0.55    Eosinophils 0.00 - 6.90 % 1.40    Eos (Absolute) 0.00 - 0.51 K/uL 0.11    Basophils 0.00 - 1.80 % 0.50    Baso (Absolute) 0.00 - 0.12 K/uL 0.04    Immature Granulocytes 0.00 - 0.90 % 0.10    Immature Granulocytes (abs) 0.00 - 0.11 K/uL 0.01    Nucleated RBC /100 WBC 0.00  "   NRBC (Absolute) K/uL 0.00    Sodium 135 - 145 mmol/L 137    Potassium 3.6 - 5.5 mmol/L 4.1    Chloride 96 - 112 mmol/L 102    Co2 20 - 33 mmol/L 25    Anion Gap 7.0 - 16.0  10.0    Glucose 65 - 99 mg/dL 106 (H)    Bun 8 - 22 mg/dL 11    Creatinine 0.50 - 1.40 mg/dL 0.69    GFR (CKD-EPI) >60 mL/min/1.73 m 2 102    Calcium 8.4 - 10.2 mg/dL 9.2    AST(SGOT) 12 - 45 U/L 22    ALT(SGPT) 2 - 50 U/L 20    Alkaline Phosphatase 30 - 99 U/L 74    Total Bilirubin 0.1 - 1.5 mg/dL 0.4    Albumin 3.2 - 4.9 g/dL 4.4    Total Protein 6.0 - 8.2 g/dL 7.3    Globulin 1.9 - 3.5 g/dL 2.9    A-G Ratio g/dL 1.5    Magnesium 1.5 - 2.5 mg/dL 1.9    Cholesterol,Tot 100 - 199 mg/dL  167   Triglycerides 0 - 149 mg/dL  123   HDL >=40 mg/dL  41   LDL <100 mg/dL  101 (H)   Troponin T 6 - 19 ng/L 8 8   (L): Data is abnormally low  (H): Data is abnormally high    Assessment and Plan:   The following treatment plan was discussed.  Signs and symptoms for which to return were discussed with patient at length.  Patient verbalized understanding.    Problem List Items Addressed This Visit       HTN (hypertension)     Chronic and stable  Continue amlodipine 10 mg daily and continue lisinopril 20 mg daily.  May need to monitor given that she is now on beta-blocker because of A. fib.  We will check to see if there is episodes of hypotension so would have her decrease her amlodipine dose         Atrial fibrillation (HCC)     New and improving  Echocardiogram showed ejection fraction of 55%.  CTA of the chest was done to rule out PE and it was negative.  Patient sent home on Eliquis 5 mg twice daily as well as metoprolol.  I will put in referral to cardiology for further evaluation         Relevant Orders    REFERRAL TO CARDIOLOGY     Other Visit Diagnoses       Encounter for screening mammogram for breast cancer        Relevant Orders    MA-SCREENING MAMMO BILAT W/TOMOSYNTHESIS W/CAD               Followup: 1 month or sooner    Please note that this  dictation was created using voice recognition software. I have made every reasonable attempt to correct obvious errors, but I expect that there are errors of grammar and possibly content that I did not discover before finalizing the note.

## 2022-10-13 NOTE — ASSESSMENT & PLAN NOTE
New and improving  Echocardiogram showed ejection fraction of 55%.  CTA of the chest was done to rule out PE and it was negative.  Patient sent home on Eliquis 5 mg twice daily as well as metoprolol.  I will put in referral to cardiology for further evaluation

## 2022-10-14 NOTE — ASSESSMENT & PLAN NOTE
Chronic and stable  Continue amlodipine 10 mg daily and continue lisinopril 20 mg daily.  May need to monitor given that she is now on beta-blocker because of A. fib.  We will check to see if there is episodes of hypotension so would have her decrease her amlodipine dose

## 2022-10-18 ENCOUNTER — TELEPHONE (OUTPATIENT)
Dept: HEALTH INFORMATION MANAGEMENT | Facility: OTHER | Age: 57
End: 2022-10-18
Payer: COMMERCIAL

## 2022-10-20 ENCOUNTER — OFFICE VISIT (OUTPATIENT)
Dept: CARDIOLOGY | Facility: MEDICAL CENTER | Age: 57
End: 2022-10-20
Attending: PHYSICIAN ASSISTANT
Payer: COMMERCIAL

## 2022-10-20 VITALS
HEART RATE: 104 BPM | OXYGEN SATURATION: 98 % | BODY MASS INDEX: 48.82 KG/M2 | RESPIRATION RATE: 16 BRPM | SYSTOLIC BLOOD PRESSURE: 126 MMHG | DIASTOLIC BLOOD PRESSURE: 80 MMHG | WEIGHT: 293 LBS | HEIGHT: 65 IN

## 2022-10-20 DIAGNOSIS — E66.01 MORBID OBESITY WITH BMI OF 50.0-59.9, ADULT (HCC): ICD-10-CM

## 2022-10-20 DIAGNOSIS — I48.19 PERSISTENT ATRIAL FIBRILLATION (HCC): ICD-10-CM

## 2022-10-20 PROCEDURE — 99204 OFFICE O/P NEW MOD 45 MIN: CPT | Performed by: INTERNAL MEDICINE

## 2022-10-20 RX ORDER — M-VIT,TX,IRON,MINS/CALC/FOLIC 27MG-0.4MG
1 TABLET ORAL DAILY
COMMUNITY
End: 2022-12-06

## 2022-10-20 RX ORDER — FLECAINIDE ACETATE 50 MG/1
100 TABLET ORAL 2 TIMES DAILY
Qty: 60 TABLET | Refills: 11 | Status: SHIPPED | OUTPATIENT
Start: 2022-10-20 | End: 2023-05-03 | Stop reason: SDUPTHER

## 2022-10-20 ASSESSMENT — FIBROSIS 4 INDEX: FIB4 SCORE: 0.76

## 2022-10-21 ENCOUNTER — TELEPHONE (OUTPATIENT)
Dept: CARDIOLOGY | Facility: MEDICAL CENTER | Age: 57
End: 2022-10-21
Payer: COMMERCIAL

## 2022-10-21 NOTE — PROGRESS NOTES
Cardiology Initial Consultation Note    Date of note:    10/21/2022      Patient Name: An Rainey   YOB: 1965  MRN:              [id number]    Chief Complaint: Atrial fibrillation    An Rainey is a 57 y.o. female  patient presented today for atrial fibrillation.  She went to the emergency room feeling not good, found to have atrial fibrillation.  She was treated with metoprolol resulted in rate control.  Subsequently discharged.  She feels better than what she did but not completely normal yet.        Past Medical History:   Diagnosis Date    Allergy     GERD (gastroesophageal reflux disease)     HTN (hypertension) 4/27/2019    Hypertension     Lymph edema              Current Outpatient Medications   Medication Sig Dispense Refill    therapeutic multivitamin-minerals (THERAGRAN-M) Tab Take 1 Tablet by mouth every day.      flecainide (TAMBOCOR) 50 MG tablet Take 2 Tablets by mouth 2 times a day. 60 Tablet 11    amLODIPine (NORVASC) 10 MG Tab Take 1 Tablet by mouth every day for 30 days. 30 Tablet 0    apixaban (ELIQUIS) 5mg Tab Take 1 Tablet by mouth 2 times a day for 30 days. Indications: Thromboembolism secondary to Atrial Fibrillation 60 Tablet 0    metoprolol tartrate (LOPRESSOR) 25 MG Tab Take 1 Tablet by mouth every 8 hours for 30 days. 90 Tablet 0    albuterol 108 (90 Base) MCG/ACT Aero Soln inhalation aerosol Inhale 2 Puffs every 6 hours as needed for Shortness of Breath.      lisinopril (PRINIVIL) 20 MG Tab Take 1 tablet by mouth once daily (Patient taking differently: Take 20 mg by mouth every day.) 90 Tablet 1    cetirizine (ZYRTEC) 10 MG Tab Take 10 mg by mouth every day. Indications: Hayfever      vitamin D (CHOLECALCIFEROL) 1000 UNIT Tab Take 1,000 Units by mouth every day.      fluticasone (FLONASE) 50 MCG/ACT nasal spray Spray 1 Spray in nose every day.       No current facility-administered medications for this visit.             Physical Exam:  Ambulatory  "Vitals  /80 (BP Location: Left arm, Patient Position: Sitting, BP Cuff Size: Adult)   Pulse (!) 104   Resp 16   Ht 1.651 m (5' 5\")   Wt (!) 149 kg (329 lb)   SpO2 98%    Oxygen Therapy:  Pulse Oximetry: 98 %  BP Readings from Last 4 Encounters:   10/20/22 126/80   10/13/22 136/89   10/11/22 (!) 146/98   02/08/22 132/80       Weight/BMI: Body mass index is 54.75 kg/m².  Wt Readings from Last 4 Encounters:   10/20/22 (!) 149 kg (329 lb)   10/13/22 (!) 155 kg (342 lb 3.2 oz)   10/10/22 (!) 157 kg (346 lb)   02/08/22 (!) 155 kg (342 lb)           General: Well appearing and in no apparent distress  Neck: carotid bruits absent  Lungs: respiratory sounds  normal  Heart: irregularly irregular rhythm,  No palpable thrills on palpation, murmurs absent, no rubs,   Lowe extremity edema absent.       Echocardiogram reviewed, independently interpreted.  Hospital discharge notes reviewed.    Medical Decision Making:  Problem List Items Addressed This Visit       Morbid obesity with BMI of 50.0-59.9, adult (HCC)    Atrial fibrillation (HCC)    Relevant Medications    flecainide (TAMBOCOR) 50 MG tablet    Other Relevant Orders    CL-CARDIOVERSION     She has symptomatic atrial fibrillation.  Continue Eliquis.  We will schedule cardioversion after 4 weeks of Eliquis therapy.  Advised to start flecainide 50 mg twice daily 1 week prior to cardioversion.  Risks and benefits discussed.  Advised to see her PCP regarding evaluation for possible sleep apnea given atrial fibrillation, RV enlargement.    Return in about 3 months (around 1/20/2023).    This note was dictated using Dragon speech recognition software.    Phong WEST  Interventional cardiologist  [facility], [facility]  [address] [address]  Phone: [phone]  Fax: [phone]               "

## 2022-10-24 ENCOUNTER — TELEPHONE (OUTPATIENT)
Dept: CARDIOLOGY | Facility: MEDICAL CENTER | Age: 57
End: 2022-10-24
Payer: COMMERCIAL

## 2022-10-24 DIAGNOSIS — I10 ESSENTIAL HYPERTENSION: ICD-10-CM

## 2022-10-24 RX ORDER — LISINOPRIL 20 MG/1
20 TABLET ORAL DAILY
Qty: 90 TABLET | Refills: 1 | Status: SHIPPED | OUTPATIENT
Start: 2022-10-24 | End: 2023-04-02 | Stop reason: SDUPTHER

## 2022-10-24 NOTE — TELEPHONE ENCOUNTER
Patient is scheduled on 11-17-22 for a Cardioversion with anesthesia with . No medications to stop and patient to check in at 9:00 for an 11:00 procedure. H&P was done on 10-20-22 by Dr. Esteban. Pre admit to call patient.

## 2022-11-09 DIAGNOSIS — I48.91 ATRIAL FIBRILLATION WITH RAPID VENTRICULAR RESPONSE (HCC): ICD-10-CM

## 2022-11-09 NOTE — TELEPHONE ENCOUNTER
Received request via: Patient    Was the patient seen in the last year in this department? Yes    Does the patient have an active prescription (recently filled or refills available) for medication(s) requested? No    Does the patient have [facility] Plus and need 100 day supply (blood pressure, diabetes and cholesterol meds only)? Patient does not have SCP

## 2022-11-11 ENCOUNTER — PRE-ADMISSION TESTING (OUTPATIENT)
Dept: ADMISSIONS | Facility: MEDICAL CENTER | Age: 57
End: 2022-11-11
Attending: INTERNAL MEDICINE
Payer: COMMERCIAL

## 2022-11-11 RX ORDER — AMLODIPINE BESYLATE 10 MG/1
10 TABLET ORAL DAILY
Status: ON HOLD | COMMUNITY
End: 2022-11-17

## 2022-11-15 ENCOUNTER — PRE-ADMISSION TESTING (OUTPATIENT)
Dept: ADMISSIONS | Facility: MEDICAL CENTER | Age: 57
End: 2022-11-15
Attending: INTERNAL MEDICINE
Payer: COMMERCIAL

## 2022-11-15 DIAGNOSIS — Z01.812 PRE-OPERATIVE LABORATORY EXAMINATION: ICD-10-CM

## 2022-11-15 LAB
ANION GAP SERPL CALC-SCNC: 9 MMOL/L (ref 7–16)
BUN SERPL-MCNC: 14 MG/DL (ref 8–22)
CALCIUM SERPL-MCNC: 9.4 MG/DL (ref 8.5–10.5)
CHLORIDE SERPL-SCNC: 105 MMOL/L (ref 96–112)
CO2 SERPL-SCNC: 28 MMOL/L (ref 20–33)
CREAT SERPL-MCNC: 0.63 MG/DL (ref 0.5–1.4)
ERYTHROCYTE [DISTWIDTH] IN BLOOD BY AUTOMATED COUNT: 50.5 FL (ref 35.9–50)
GFR SERPLBLD CREATININE-BSD FMLA CKD-EPI: 105 ML/MIN/1.73 M 2
GLUCOSE SERPL-MCNC: 104 MG/DL (ref 65–99)
HCT VFR BLD AUTO: 40.2 % (ref 37–47)
HGB BLD-MCNC: 12.7 G/DL (ref 12–16)
INR PPP: 1.27 (ref 0.87–1.13)
MCH RBC QN AUTO: 28.1 PG (ref 27–33)
MCHC RBC AUTO-ENTMCNC: 31.6 G/DL (ref 33.6–35)
MCV RBC AUTO: 88.9 FL (ref 81.4–97.8)
PLATELET # BLD AUTO: 309 K/UL (ref 164–446)
PMV BLD AUTO: 10.9 FL (ref 9–12.9)
POTASSIUM SERPL-SCNC: 4.6 MMOL/L (ref 3.6–5.5)
PROTHROMBIN TIME: 15.7 SEC (ref 12–14.6)
RBC # BLD AUTO: 4.52 M/UL (ref 4.2–5.4)
SODIUM SERPL-SCNC: 142 MMOL/L (ref 135–145)
WBC # BLD AUTO: 6.4 K/UL (ref 4.8–10.8)

## 2022-11-15 PROCEDURE — 36415 COLL VENOUS BLD VENIPUNCTURE: CPT

## 2022-11-15 PROCEDURE — 80048 BASIC METABOLIC PNL TOTAL CA: CPT

## 2022-11-15 PROCEDURE — 85610 PROTHROMBIN TIME: CPT

## 2022-11-15 PROCEDURE — 85027 COMPLETE CBC AUTOMATED: CPT

## 2022-11-17 ENCOUNTER — APPOINTMENT (OUTPATIENT)
Dept: CARDIOLOGY | Facility: MEDICAL CENTER | Age: 57
End: 2022-11-17
Attending: INTERNAL MEDICINE
Payer: COMMERCIAL

## 2022-11-17 ENCOUNTER — ANESTHESIA EVENT (OUTPATIENT)
Dept: CARDIOLOGY | Facility: MEDICAL CENTER | Age: 57
End: 2022-11-17
Payer: COMMERCIAL

## 2022-11-17 ENCOUNTER — HOSPITAL ENCOUNTER (OUTPATIENT)
Facility: MEDICAL CENTER | Age: 57
End: 2022-11-17
Attending: INTERNAL MEDICINE | Admitting: INTERNAL MEDICINE
Payer: COMMERCIAL

## 2022-11-17 ENCOUNTER — ANESTHESIA (OUTPATIENT)
Dept: CARDIOLOGY | Facility: MEDICAL CENTER | Age: 57
End: 2022-11-17
Payer: COMMERCIAL

## 2022-11-17 ENCOUNTER — APPOINTMENT (OUTPATIENT)
Dept: MEDICAL GROUP | Facility: MEDICAL CENTER | Age: 57
End: 2022-11-17
Payer: COMMERCIAL

## 2022-11-17 VITALS
TEMPERATURE: 96.7 F | DIASTOLIC BLOOD PRESSURE: 79 MMHG | OXYGEN SATURATION: 94 % | HEIGHT: 65 IN | SYSTOLIC BLOOD PRESSURE: 129 MMHG | HEART RATE: 55 BPM | RESPIRATION RATE: 18 BRPM | WEIGHT: 293 LBS | BODY MASS INDEX: 48.82 KG/M2

## 2022-11-17 DIAGNOSIS — I48.19 PERSISTENT ATRIAL FIBRILLATION (HCC): ICD-10-CM

## 2022-11-17 LAB — EKG IMPRESSION: NORMAL

## 2022-11-17 PROCEDURE — 93010 ELECTROCARDIOGRAM REPORT: CPT | Performed by: INTERNAL MEDICINE

## 2022-11-17 PROCEDURE — 93005 ELECTROCARDIOGRAM TRACING: CPT | Performed by: INTERNAL MEDICINE

## 2022-11-17 RX ORDER — AMLODIPINE BESYLATE 10 MG/1
10 TABLET ORAL DAILY
COMMUNITY
End: 2022-12-20

## 2022-11-17 RX ORDER — SODIUM CHLORIDE, SODIUM LACTATE, POTASSIUM CHLORIDE, CALCIUM CHLORIDE 600; 310; 30; 20 MG/100ML; MG/100ML; MG/100ML; MG/100ML
INJECTION, SOLUTION INTRAVENOUS CONTINUOUS
Status: DISCONTINUED | OUTPATIENT
Start: 2022-11-17 | End: 2022-11-17 | Stop reason: HOSPADM

## 2022-11-17 ASSESSMENT — FIBROSIS 4 INDEX: FIB4 SCORE: 0.88

## 2022-11-17 NOTE — PROCEDURES
"Patient's pre-op EKG demonstrated sinus bradcycardia. Dr. Esteban cancelled cardioversion today. Patient states she has had intermittent chest pain that is baseline for her, but has worsened since being started on her flecainide last week. Patient described it as \"elephant on chest\" and 4/10. Dr. Esteban at bedside to assess patient and discuss. Patient states this is tolerable and per MD, patient is safe to discharge and follow up in office. Patient is to alert of any new changes. Patient and care team in agreement with plan. Belongings returned to the patient. Patient discharged home to responsible adult.  "

## 2022-11-17 NOTE — OR NURSING
Pre-op assessment complete, VSS stable, pt and family updated on POC. Call light within reach. Denies needs at this time.

## 2022-11-17 NOTE — ANESTHESIA PREPROCEDURE EVALUATION
Date/Time: 11/17/22 1100    Scheduled providers: Phong Esteban M.D.; Daniele Demarco M.D.    Procedure: CL-CARDIOVERSION    Diagnosis: Persistent atrial fibrillation (HCC) [I48.19]    Indications: See Associated Dx    Location: [facility] IMAGING - ECHOCARDIOLOGY [facility]          Relevant Problems   CARDIAC   (positive) Atrial fibrillation (HCC)   (positive) HTN (hypertension)      GI   (positive) GERD (gastroesophageal reflux disease)       Physical Exam    Airway   Mallampati: II  TM distance: >3 FB  Neck ROM: full       Cardiovascular - normal exam  Rhythm: irregular  Rate: normal  (-) murmur     Dental - normal exam           Pulmonary - normal exam  Breath sounds clear to auscultation     Abdominal    Neurological - normal exam                 Anesthesia Plan    ASA 3 (chart)       Plan - general       Airway plan will be natural airway          Induction: intravenous    Postoperative Plan: Postoperative administration of opioids is intended.    Pertinent diagnostic labs and testing reviewed    Informed Consent:    Anesthetic plan and risks discussed with patient.    Use of blood products discussed with: patient whom consented to blood products.

## 2022-11-17 NOTE — PROGRESS NOTES
Med Rec Complete per patient  Allergies Reviewed with patient  No antibiotics within the last 30 days  Patient's Preferred Pharmacy: [facility] on [address]       Patient takes Eliquis 5mg, twice dialy.

## 2022-12-06 ENCOUNTER — OFFICE VISIT (OUTPATIENT)
Dept: CARDIOLOGY | Facility: MEDICAL CENTER | Age: 57
End: 2022-12-06
Payer: COMMERCIAL

## 2022-12-06 ENCOUNTER — OFFICE VISIT (OUTPATIENT)
Dept: MEDICAL GROUP | Facility: MEDICAL CENTER | Age: 57
End: 2022-12-06
Payer: COMMERCIAL

## 2022-12-06 VITALS
HEART RATE: 76 BPM | DIASTOLIC BLOOD PRESSURE: 80 MMHG | BODY MASS INDEX: 48.82 KG/M2 | WEIGHT: 293 LBS | OXYGEN SATURATION: 93 % | HEIGHT: 65 IN | SYSTOLIC BLOOD PRESSURE: 136 MMHG | RESPIRATION RATE: 18 BRPM

## 2022-12-06 VITALS
DIASTOLIC BLOOD PRESSURE: 74 MMHG | HEIGHT: 65 IN | BODY MASS INDEX: 48.82 KG/M2 | WEIGHT: 293 LBS | TEMPERATURE: 97.7 F | HEART RATE: 77 BPM | OXYGEN SATURATION: 93 % | SYSTOLIC BLOOD PRESSURE: 110 MMHG | RESPIRATION RATE: 16 BRPM

## 2022-12-06 DIAGNOSIS — I10 ESSENTIAL HYPERTENSION: ICD-10-CM

## 2022-12-06 DIAGNOSIS — I10 PRIMARY HYPERTENSION: ICD-10-CM

## 2022-12-06 DIAGNOSIS — E66.01 MORBID OBESITY WITH BMI OF 50.0-59.9, ADULT (HCC): ICD-10-CM

## 2022-12-06 DIAGNOSIS — I48.91 ATRIAL FIBRILLATION WITH RAPID VENTRICULAR RESPONSE (HCC): ICD-10-CM

## 2022-12-06 DIAGNOSIS — I48.0 PAROXYSMAL ATRIAL FIBRILLATION (HCC): ICD-10-CM

## 2022-12-06 DIAGNOSIS — E66.01 MORBID OBESITY (HCC): ICD-10-CM

## 2022-12-06 DIAGNOSIS — Z02.89 ENCOUNTER FOR COMPLETION OF FORM WITH PATIENT: ICD-10-CM

## 2022-12-06 DIAGNOSIS — I89.0 LYMPHEDEMA: ICD-10-CM

## 2022-12-06 PROCEDURE — 99214 OFFICE O/P EST MOD 30 MIN: CPT | Performed by: NURSE PRACTITIONER

## 2022-12-06 PROCEDURE — 99214 OFFICE O/P EST MOD 30 MIN: CPT | Performed by: PHYSICIAN ASSISTANT

## 2022-12-06 ASSESSMENT — FIBROSIS 4 INDEX
FIB4 SCORE: 0.88
FIB4 SCORE: 0.88

## 2022-12-06 NOTE — PROGRESS NOTES
"      Cardiology Clinic Follow-up Note    Date of note:    12/6/2022  Primary Care Provider: Monika Dejesus P.A.-C.    Name:             An Rainey  YOB: 1965  MRN:               [id number]    CC: Follow-up on cardioversion    Primary Cardiologist: Dr. Esteban    Patient HPI:   An Rainey is a 57 y.o. female with current medical problems including PAF on Flecainide     Interim History:  Ms. Rainey was last seen in this cardiology office by Dr. Esteban on 10/20/22 with new onset symptomatic atrial fibrillation, and she was started on flecainide 50 mg twice daily 1 week prior to cardioversion which was successful did not occur as patient had already converted to sinus bradycardia.  Her cardioversion was scheduled for 11/17/2022 upon arrival she did describe experiencing chest pressure which had worsened with initiating flecainide, she did describe she felt an elephant on her chest at that time.    Afib only recurred once, since starting flecainide, episode lasted about 5 minutes.    Senthilquis expensive, $75 monthly but not interested in warfarin and INR checks.  Never followed up with bariatric surgery referral, co-pay too expensive  Starting \"[place] weight loss\" starting in January.    Patient endorses medication compliance.    She denies palpitations, chest pain, shortness of breath, dyspnea on exertion, dizziness or syncopal episodes, orthopnea, PND, lower extremity swelling, and recent weight gain.     Cardiovascular Risk Factors:  1. Smoking status: never  2. Type II Diabetes Mellitus: no No results found for: HBA1C  3. Hypertension: yes  4. Dyslipidemia: no   Cholesterol,Tot   Date Value Ref Range Status   10/11/2022 167 100 - 199 mg/dL Final     LDL   Date Value Ref Range Status   10/11/2022 101 (H) <100 mg/dL Final     HDL   Date Value Ref Range Status   10/11/2022 41 >=40 mg/dL Final     Triglycerides   Date Value Ref Range Status   10/11/2022 123 0 - 149 mg/dL Final     5. Family " history of early Coronary Artery Disease in a first degree relative (Male less than 55 years of age; Female less than 65 years of age): No  6.  Obesity and/or Metabolic Syndrome: BMI 54.75  7. Sedentary lifestyle: yes    Review of systems:  All others systems reviewed and negative except for what is outlined in the above HPI    Past Medical History:   Diagnosis Date    Allergy     Arrhythmia 11/11/2022    Afib    Breath shortness 11/11/2022    With exertion and seasonal allergies.    GERD (gastroesophageal reflux disease)     History of anemia     HTN (hypertension) 04/27/2019    Hypertension     Lymph edema     Bilateral lower extremities and feet.    Seasonal allergies     Snoring      History reviewed. No pertinent surgical history.  Family History   Problem Relation Age of Onset    Cancer Mother     Cancer Father     Cancer Sister     Cancer Maternal Grandmother      Social History     Socioeconomic History    Marital status: Other     Spouse name: Not on file    Number of children: Not on file    Years of education: Not on file    Highest education level: Not on file   Occupational History    Not on file   Tobacco Use    Smoking status: Never    Smokeless tobacco: Never   Vaping Use    Vaping Use: Never used   Substance and Sexual Activity    Alcohol use: Yes     Comment: Rarely    Drug use: No    Sexual activity: Not on file   Other Topics Concern    Not on file   Social History Narrative    Not on file     Social Determinants of Health     Financial Resource Strain: Not on file   Food Insecurity: Not on file   Transportation Needs: Not on file   Physical Activity: Not on file   Stress: Not on file   Social Connections: Not on file   Intimate Partner Violence: Not on file   Housing Stability: Not on file     No Known Allergies  Current Outpatient Medications   Medication Sig Dispense Refill    amLODIPine (NORVASC) 10 MG Tab Take 1 Tablet by mouth every day.      apixaban (ELIQUIS) 5mg Tab Take 1 Tablet by  "mouth 2 times a day for 30 days. Indications: Thromboembolism secondary to Atrial Fibrillation 60 Tablet 0    lisinopril (PRINIVIL) 20 MG Tab Take 1 Tablet by mouth every day. 90 Tablet 1    therapeutic multivitamin-minerals (THERAGRAN-M) Tab Take 1 Tablet by mouth every day.      flecainide (TAMBOCOR) 50 MG tablet Take 2 Tablets by mouth 2 times a day. 60 Tablet 11    albuterol 108 (90 Base) MCG/ACT Aero Soln inhalation aerosol Inhale 2 Puffs every 6 hours as needed for Shortness of Breath.      cetirizine (ZYRTEC) 10 MG Tab Take 1 Tablet by mouth every day. Indications: Hayfever      Cholecalciferol (VITAMIN D) 2000 UNIT Tab Take 500 Units by mouth every day.      fluticasone (FLONASE) 50 MCG/ACT nasal spray Administer 1 Spray into affected nostril(S) every day.       No current facility-administered medications for this visit.       Physical Exam:  Ambulatory Vitals  /80 (BP Location: Left arm, Patient Position: Sitting, BP Cuff Size: Large adult)   Pulse 76   Resp 18   Ht 1.651 m (5' 5\")   Wt (!) 149 kg (329 lb)   SpO2 93%    BP Readings from Last 4 Encounters:   12/06/22 136/80   11/17/22 129/79   10/20/22 126/80   10/13/22 136/89       Weight/BMI: Body mass index is 54.75 kg/m².  Wt Readings from Last 4 Encounters:   12/06/22 (!) 149 kg (329 lb)   11/17/22 (!) 161 kg (355 lb 9.6 oz)   10/20/22 (!) 149 kg (329 lb)   10/13/22 (!) 155 kg (342 lb 3.2 oz)       General: No apparent distress. Well nourished. BMI 54  Neck: No JVD. No caroid bruits, trachea midline  Lungs: CTAB. Normal effort, without crackles/rhonchi, no wheezing  Heart: RRR. Normal S1/S2, no murmur, diminished heart ones, no rub. Chronic lymphedema to bilateral lower extremity edema. 2+ radial pulses, 2+ DT pulses  Ext: No clubbing or cyanosis.  Abdomen: soft, non tender, non distended, no markus hepatomegaly.  Neurological: No focal deficits, no facial asymmetry.  Normal speech.  Psychiatric: Appropriate affect, alert and oriented x 4. "   Skin: Warm and dry, no rash.    Lab Data Review:  Lab Results   Component Value Date/Time    CHOLSTRLTOT 167 10/11/2022 02:13 AM     (H) 10/11/2022 02:13 AM    HDL 41 10/11/2022 02:13 AM    TRIGLYCERIDE 123 10/11/2022 02:13 AM       Lab Results   Component Value Date/Time    SODIUM 142 11/15/2022 08:45 AM    POTASSIUM 4.6 11/15/2022 08:45 AM    CHLORIDE 105 11/15/2022 08:45 AM    CO2 28 11/15/2022 08:45 AM    GLUCOSE 104 (H) 11/15/2022 08:45 AM    BUN 14 11/15/2022 08:45 AM    CREATININE 0.63 11/15/2022 08:45 AM     Lab Results   Component Value Date/Time    ALKPHOSPHAT 74 10/10/2022 01:15 PM    ASTSGOT 22 10/10/2022 01:15 PM    ALTSGPT 20 10/10/2022 01:15 PM    TBILIRUBIN 0.4 10/10/2022 01:15 PM      Lab Results   Component Value Date/Time    WBC 6.4 11/15/2022 08:45 AM       Cardiac Imaging and Procedures Review:      EKG 22: My Personal interpretation reveals SB 48    Echo 10/10/22:  Mild concentric left ventricular hypertrophy.  Grossly normal left ventricular systolic function.  Dilated right ventricle with akinesis of the free wall- findings are   suspicious for pulmonary embolism.     Stress Test 2019:   NUCLEAR IMAGING INTERPRETATION   No evidence of significant jeopardized viable myocardium or prior myocardial    infarction.   Normal left ventricular ejection fraction, and wall motion.   Mild dilatation of LV cavity.      Sinus rhythm.   Nondiagnostic ECG portion of regadenoson nuclear stress test.   ECG INTERPRETATION   Nondiagnostic ECG portion of regadenoson nuclear stress test.    Assessment and Clinical Decision Makin. Morbid obesity (HCC)  Referral to Pulmonary and Sleep Medicine      2. Paroxysmal atrial fibrillation (HCC)  Referral to Pulmonary and Sleep Medicine      3. Morbid obesity with BMI of 50.0-59.9, adult (HCC)        4. Primary hypertension        5. Lymphedema          The following treatment plan was discussed    Morbid obesity, BMI 54.7  Lymphedema  -Discussed  diet changes and encouraged weight loss  -Patient motivated to start weight loss program in January  -Not interested in bariatric surgery currently  -Recommend MANUEL hose to help with lymphedema as well as drastic weight loss  -elevate legs when able, consider lymphedema pump  -Referral to sleep medicine for EVERARDO assessment    PAF  -Rate improved off metoprolol now in the 70s  -Continue flecainide 50 mg twice daily  -Surveillance EKG/follow-ups  -Continue Eliquis 5 mg bid  -[id number] Score 2    HTN  -Slightly elevated in office today  -Increase antihypertension management if at next visit BP remains > goal of 130/80   -Continue amlodipine 10 mg daily and lisinopril 20 mg daily    Plan reviewed in detail with the patient, verbalizes understanding and is in agreement.  Pt is to follow up with myself in 6 months  Encouraged Pt to follow up with us over the phone or electronically using my Zectert as cardiac issues/concerns arise.      PLEASE NOTE: This dictation was created using voice recognition software. I have made every reasonable attempt to correct obvious errors, but I expect that there are errors of grammar and possibly content that I did not discover before finalizing the note.       AURELIANO Dozier.   [facility]  [phone]    Collaborating Physician: Dr Stein

## 2022-12-07 ENCOUNTER — TELEPHONE (OUTPATIENT)
Dept: RESEARCH | Facility: WORKSITE | Age: 57
End: 2022-12-07
Payer: COMMERCIAL

## 2022-12-08 DIAGNOSIS — I48.91 ATRIAL FIBRILLATION WITH RAPID VENTRICULAR RESPONSE (HCC): ICD-10-CM

## 2022-12-12 RX ORDER — APIXABAN 5 MG/1
TABLET, FILM COATED ORAL
Qty: 60 TABLET | Refills: 0 | Status: SHIPPED | OUTPATIENT
Start: 2022-12-12 | End: 2023-01-12

## 2022-12-12 NOTE — TELEPHONE ENCOUNTER
Received request via: Pharmacy    Was the patient seen in the last year in this department? Yes, LOV: 12/06/2022    Does the patient have an active prescription (recently filled or refills available) for medication(s) requested? No    Does the patient have [facility] Plus and need 100 day supply (blood pressure, diabetes and cholesterol meds only)? Patient does not have SCP

## 2022-12-19 NOTE — TELEPHONE ENCOUNTER
Received request via: Pharmacy    Was the patient seen in the last year in this department? Yes, LOV: 12/06/2022    Does the patient have an active prescription (recently filled or refills available) for medication(s) requested? No    Does the patient have [facility] Plus and need 100 day supply (blood pressure, diabetes and cholesterol meds only)? Patient does not have SCP

## 2022-12-20 RX ORDER — AMLODIPINE BESYLATE 10 MG/1
TABLET ORAL
Qty: 90 TABLET | Refills: 0 | Status: SHIPPED | OUTPATIENT
Start: 2022-12-20 | End: 2023-04-04

## 2022-12-22 DIAGNOSIS — I48.91 ATRIAL FIBRILLATION WITH RAPID VENTRICULAR RESPONSE (HCC): ICD-10-CM

## 2022-12-22 DIAGNOSIS — I10 ESSENTIAL HYPERTENSION: ICD-10-CM

## 2022-12-22 RX ORDER — FLECAINIDE ACETATE 50 MG/1
100 TABLET ORAL 2 TIMES DAILY
Qty: 60 TABLET | Refills: 11 | OUTPATIENT
Start: 2022-12-22

## 2022-12-22 RX ORDER — LISINOPRIL 20 MG/1
20 TABLET ORAL DAILY
Qty: 90 TABLET | Refills: 1 | OUTPATIENT
Start: 2022-12-22

## 2022-12-22 RX ORDER — AMLODIPINE BESYLATE 10 MG/1
TABLET ORAL
Qty: 90 TABLET | Refills: 0 | OUTPATIENT
Start: 2022-12-22

## 2022-12-22 NOTE — TELEPHONE ENCOUNTER
Pt would like to change pharmacy,       Received request via: Patient    Was the patient seen in the last year in this department? Yes    Does the patient have an active prescription (recently filled or refills available) for medication(s) requested? No    Does the patient have [facility] and need 100 day supply (blood pressure, diabetes and cholesterol meds only)? Patient does not have SCP

## 2023-01-10 DIAGNOSIS — I48.91 ATRIAL FIBRILLATION WITH RAPID VENTRICULAR RESPONSE (HCC): ICD-10-CM

## 2023-01-12 RX ORDER — APIXABAN 5 MG/1
TABLET, FILM COATED ORAL
Qty: 60 TABLET | Refills: 0 | Status: SHIPPED | OUTPATIENT
Start: 2023-01-12 | End: 2023-02-13

## 2023-01-12 NOTE — TELEPHONE ENCOUNTER
Received request via: Pharmacy    Was the patient seen in the last year in this department? Yes    Does the patient have an active prescription (recently filled or refills available) for medication(s) requested? No    Does the patient have shelter Plus and need 100 day supply (blood pressure, diabetes and cholesterol meds only)? Patient does not have SCP

## 2023-01-16 ENCOUNTER — TELEPHONE (OUTPATIENT)
Dept: HEALTH INFORMATION MANAGEMENT | Facility: OTHER | Age: 58
End: 2023-01-16
Payer: COMMERCIAL

## 2023-02-10 DIAGNOSIS — I48.91 ATRIAL FIBRILLATION WITH RAPID VENTRICULAR RESPONSE (HCC): ICD-10-CM

## 2023-02-13 RX ORDER — APIXABAN 5 MG/1
TABLET, FILM COATED ORAL
Qty: 60 TABLET | Refills: 0 | Status: SHIPPED | OUTPATIENT
Start: 2023-02-13 | End: 2023-03-13 | Stop reason: SDUPTHER

## 2023-02-23 ENCOUNTER — APPOINTMENT (OUTPATIENT)
Dept: MEDICAL GROUP | Facility: MEDICAL CENTER | Age: 58
End: 2023-02-23
Payer: COMMERCIAL

## 2023-02-27 ENCOUNTER — OFFICE VISIT (OUTPATIENT)
Dept: MEDICAL GROUP | Facility: MEDICAL CENTER | Age: 58
End: 2023-02-27
Payer: COMMERCIAL

## 2023-02-27 VITALS
SYSTOLIC BLOOD PRESSURE: 128 MMHG | BODY MASS INDEX: 48.82 KG/M2 | OXYGEN SATURATION: 91 % | TEMPERATURE: 98.3 F | WEIGHT: 293 LBS | HEART RATE: 86 BPM | HEIGHT: 65 IN | DIASTOLIC BLOOD PRESSURE: 78 MMHG

## 2023-02-27 DIAGNOSIS — Z12.31 ENCOUNTER FOR SCREENING MAMMOGRAM FOR BREAST CANCER: ICD-10-CM

## 2023-02-27 DIAGNOSIS — I48.91 ATRIAL FIBRILLATION WITH RAPID VENTRICULAR RESPONSE (HCC): ICD-10-CM

## 2023-02-27 DIAGNOSIS — B35.1 ONYCHOMYCOSIS: ICD-10-CM

## 2023-02-27 DIAGNOSIS — N95.0 POSTMENOPAUSAL BLEEDING: ICD-10-CM

## 2023-02-27 PROCEDURE — 99214 OFFICE O/P EST MOD 30 MIN: CPT | Performed by: PHYSICIAN ASSISTANT

## 2023-02-27 ASSESSMENT — FIBROSIS 4 INDEX: FIB4 SCORE: 0.88

## 2023-02-27 NOTE — PROGRESS NOTES
Subjective:     Chief Complaint   Patient presents with    Follow-Up     Has questions about her period     An Rainey is a 57 y.o. female here today to follow to Discuss:    HPI:    An is a pleasant 57-year-old comes in with 2 weeks of vaginal bleeding.  It is intermittent.  Was originally bright red blood but now is spotting and dark.  She has not been sexually active with any partner for 2 years.  Denies vaginal itch, irritation or odor.  Denies fall, trauma or injury    Current medicines (including changes today)  Current Outpatient Medications   Medication Sig Dispense Refill    ELIQUIS 5 MG Tab TAKE 1 TABLET BY MOUTH TWICE DAILY FOR  THROMBOEMBOLISM  SECONDARY  TO  ATRIAL  FIBRILLATION 60 Tablet 0    amLODIPine (NORVASC) 10 MG Tab TAKE 1 TABLET BY MOUTH ONCE DAILY FOR HIGH BLOOD PRESSURE 90 Tablet 0    lisinopril (PRINIVIL) 20 MG Tab Take 1 Tablet by mouth every day. 90 Tablet 1    flecainide (TAMBOCOR) 50 MG tablet Take 2 Tablets by mouth 2 times a day. 60 Tablet 11    albuterol 108 (90 Base) MCG/ACT Aero Soln inhalation aerosol Inhale 2 Puffs every 6 hours as needed for Shortness of Breath.      Cholecalciferol (VITAMIN D) 2000 UNIT Tab Take 500 Units by mouth every day.      fluticasone (FLONASE) 50 MCG/ACT nasal spray Administer 1 Spray into affected nostril(S) every day.       No current facility-administered medications for this visit.     She  has a past medical history of Allergy, Arrhythmia (11/11/2022), Breath shortness (11/11/2022), GERD (gastroesophageal reflux disease), History of anemia, HTN (hypertension) (04/27/2019), Hypertension, Lymph edema, Seasonal allergies, and Snoring.    She has no past medical history of Asthma, Congestive heart failure (HCC), Liver disease, Stroke (HCC), or Type II or unspecified type diabetes mellitus without mention of complication, not stated as uncontrolled.    ROS  As per listed in the HPI, otherwise negative     Objective:     /78 (BP  "Location: Right arm, Patient Position: Sitting, BP Cuff Size: Adult)   Pulse 86   Temp 36.8 °C (98.3 °F)   Ht 1.651 m (5' 5\")   Wt (!) 159 kg (350 lb)   SpO2 91%  Body mass index is 58.24 kg/m².     Physical Exam:  General: Patient appears well-nourished, well-hydrated, nontoxic  HEENT, normocephalic atraumatic, PERRLA, extraocular movements intact, nares are patent and clear  Neck: No visible masses, thyromegaly or abnormalities noted  Cardiovascular.  Sitting comfortably without visible signs of edema  Lungs: No cyanosis noted, nondyspneic  Skin: Well perfused without evidence of rash or lesions  Neurological: Cranial nerves II through XII intact, normal gait  Musculoskeletal: Normal range of motion, normal strength and no deficit noted       Assessment and Plan:   The following treatment plan was discussed and signs and symptoms for which to return were discussed with patient.  Patient verbalized understanding.    1. Postmenopausal bleeding  New and unstable  Need to rule out neoplastic process  - CBC WITH DIFFERENTIAL; Future  - Referral to Gynecology  - US-PELVIC TRANSVAGINAL ONLY; Future    2. Encounter for screening mammogram for breast cancer  Mammogram was ordered in October 2022.  Has not gotten this done.  I am reprinting doubt    3. Onychomycosis  Chronic and unstable  Patient never psychiatrist and is asking about this.  I put in an order October 2021 and I am printing this out again  - Referral to Podiatry    4. Atrial fibrillation with rapid ventricular response (HCC)  Chronic and stable  Patient has appointment scheduled with cardiology in 2 weeks in March.  She takes Eliquis, flecainide and lisinopril.  Continue care and medical course       Followup: 1 month or sooner as needed to follow-up on gynecology states regarding postmenopausal bleeding.  If symptoms worsen anyways please go to the emergency room         Please note that this dictation was created using voice recognition software. I " have made every reasonable attempt to correct obvious errors, but I expect that there are errors of grammar and possibly content that I did not discover before finalizing the note.

## 2023-03-08 ENCOUNTER — TELEPHONE (OUTPATIENT)
Dept: HEALTH INFORMATION MANAGEMENT | Facility: OTHER | Age: 58
End: 2023-03-08
Payer: COMMERCIAL

## 2023-03-13 DIAGNOSIS — I48.91 ATRIAL FIBRILLATION WITH RAPID VENTRICULAR RESPONSE (HCC): ICD-10-CM

## 2023-03-27 ENCOUNTER — APPOINTMENT (OUTPATIENT)
Dept: RADIOLOGY | Facility: MEDICAL CENTER | Age: 58
End: 2023-03-27
Attending: PHYSICIAN ASSISTANT
Payer: COMMERCIAL

## 2023-03-27 DIAGNOSIS — N95.0 POSTMENOPAUSAL BLEEDING: ICD-10-CM

## 2023-03-27 DIAGNOSIS — Z12.31 ENCOUNTER FOR SCREENING MAMMOGRAM FOR BREAST CANCER: ICD-10-CM

## 2023-03-27 PROCEDURE — 76856 US EXAM PELVIC COMPLETE: CPT

## 2023-03-27 PROCEDURE — 77067 SCR MAMMO BI INCL CAD: CPT

## 2023-03-28 ENCOUNTER — APPOINTMENT (OUTPATIENT)
Dept: MEDICAL GROUP | Facility: MEDICAL CENTER | Age: 58
End: 2023-03-28
Payer: COMMERCIAL

## 2023-03-30 DIAGNOSIS — N95.0 POSTMENOPAUSAL BLEEDING: ICD-10-CM

## 2023-04-02 DIAGNOSIS — I48.91 ATRIAL FIBRILLATION WITH RAPID VENTRICULAR RESPONSE (HCC): ICD-10-CM

## 2023-04-02 DIAGNOSIS — I10 ESSENTIAL HYPERTENSION: ICD-10-CM

## 2023-04-04 RX ORDER — LISINOPRIL 20 MG/1
20 TABLET ORAL DAILY
Qty: 90 TABLET | Refills: 1 | Status: SHIPPED | OUTPATIENT
Start: 2023-04-04 | End: 2023-05-03 | Stop reason: SDUPTHER

## 2023-04-04 RX ORDER — AMLODIPINE BESYLATE 10 MG/1
10 TABLET ORAL DAILY
Qty: 90 TABLET | Refills: 2 | Status: SHIPPED | OUTPATIENT
Start: 2023-04-04 | End: 2023-07-03

## 2023-04-04 RX ORDER — AMLODIPINE BESYLATE 10 MG/1
TABLET ORAL
Qty: 90 TABLET | Refills: 0 | Status: SHIPPED | OUTPATIENT
Start: 2023-04-04 | End: 2023-06-01

## 2023-04-06 ENCOUNTER — OFFICE VISIT (OUTPATIENT)
Dept: MEDICAL GROUP | Facility: MEDICAL CENTER | Age: 58
End: 2023-04-06
Payer: COMMERCIAL

## 2023-04-06 VITALS
OXYGEN SATURATION: 94 % | DIASTOLIC BLOOD PRESSURE: 86 MMHG | HEIGHT: 65 IN | RESPIRATION RATE: 12 BRPM | SYSTOLIC BLOOD PRESSURE: 134 MMHG | BODY MASS INDEX: 48.82 KG/M2 | HEART RATE: 55 BPM | WEIGHT: 293 LBS | TEMPERATURE: 97.7 F

## 2023-04-06 DIAGNOSIS — E66.01 MORBID OBESITY (HCC): ICD-10-CM

## 2023-04-06 DIAGNOSIS — N95.0 POSTMENOPAUSAL BLEEDING: ICD-10-CM

## 2023-04-06 DIAGNOSIS — I48.91 ATRIAL FIBRILLATION WITH RAPID VENTRICULAR RESPONSE (HCC): ICD-10-CM

## 2023-04-06 PROCEDURE — 99214 OFFICE O/P EST MOD 30 MIN: CPT | Performed by: PHYSICIAN ASSISTANT

## 2023-04-06 ASSESSMENT — FIBROSIS 4 INDEX: FIB4 SCORE: 0.88

## 2023-04-06 ASSESSMENT — PATIENT HEALTH QUESTIONNAIRE - PHQ9: CLINICAL INTERPRETATION OF PHQ2 SCORE: 0

## 2023-04-06 NOTE — PROGRESS NOTES
"Subjective:   An Rainey is a 57 y.o. female here today for     HPI:Patient is here to discuss:    Problem   Postmenopausal Bleeding    March 30, 2023: Transvaginal pelvic ultrasound showed endometrial thickening which could be residual blood or hyperplasia versus malignancy.There is a 1.3 x 1.4 cm hypoechoic lesion in the uterine fundus on the left side.  I contacted patient and let her know that she needs to make appoint with gynecology.  I put in a referral for gynecology that has been authorized back in February.    April 6, 2023: Patient comes in to go over labs.  Has not had any vaginal bleeding since last appointment        Atrial Fibrillation With Rapid Ventricular Response (Hcc)    October 10, 2022: Patient went to the emergency room with chest pain and palpitations.  Was found to be in atrial fibrillation.  Echocardiogram showed ejection fraction of 55%.  CTA of the chest was done to rule out PE and it was negative.  Patient sent home on Eliquis 5 mg twice daily as well as metoprolol.     April 5/6/2023: Stable taking amlodipine, Eliquis, flecainide, lisinopril     Morbid Obesity (Hcc)    Referral for Dr edwards was given, but copay was too expensive         ROS   No headaches, chest pain, no shortness of breath, abdominal pain, nausea, or vomiting.  All other systems were reviewed and are negative or noted as positive in the HPI.     Objective:     /86 (BP Location: Right arm, Patient Position: Sitting, BP Cuff Size: Adult)   Pulse (!) 55   Temp 36.5 °C (97.7 °F)   Resp 12   Ht 1.651 m (5' 5\")   Wt (!) 165 kg (363 lb)   SpO2 94%  Body mass index is 60.41 kg/m².     Physical Exam:  General: Patient appears well-nourished, well-hydrated, nontoxic  HEENT, normocephalic atraumatic, PERRLA, extraocular movements intact, nares are patent and clear  Neck: No visible masses, thyromegaly or abnormalities noted  Cardiovascular.  Sitting comfortably without visible signs of edema  Lungs: No " cyanosis noted, nondyspneic  Skin: Well perfused without evidence of rash or lesions  Neurological: Cranial nerves II through XII intact, normal gait  Musculoskeletal: Normal range of motion, normal strength and no deficit noted     Clinical Course/Lab Analysis:    IMPRESSION:     1.  There is thickening of the endometrial echo complex with no abnormal vascularity. This could be due to residual blood products or hyperplasia versus malignancy.  2.  There is an incidental uterine fundal fibroid.    Assessment and Plan:   The following treatment plan was discussed.  Signs and symptoms for which to return were discussed with patient at length.  Patient verbalized understanding.    1. Postmenopausal bleeding        2. Morbid obesity (HCC)        3. Atrial fibrillation with rapid ventricular response (HCC)            1.  New and unstable  Patient has not had vaginal bleeding since last appointment.  I did discuss transvaginal pelvic ultrasound results at length.  She reports that [facility] gy[facility]cology did already call to set up appointment.  She understands the importance of calling them back and that they will possibly do an endometrial biopsy.    2.  Chronic and unstable  Patient is motivated to lose weight.  She did have referral to Dr. Jung but the co-pay was too expensive. She is not a canidate for phentermine given hx of afib.    3. Chronic and stable  Patient has a follow-up with cardiology in June.  She is taking amlodipine for blood pressure control as well as Eliquis, flecainide and lisinopril for atrial fibrillation    Followup: 3 months or prn    Please note that this dictation was created using voice recognition software. I have made every reasonable attempt to correct obvious errors, but I expect that there are errors of grammar and possibly content that I did not discover before finalizing the note.

## 2023-05-03 DIAGNOSIS — I10 ESSENTIAL HYPERTENSION: ICD-10-CM

## 2023-05-07 ENCOUNTER — PATIENT MESSAGE (OUTPATIENT)
Dept: MEDICAL GROUP | Facility: MEDICAL CENTER | Age: 58
End: 2023-05-07
Payer: COMMERCIAL

## 2023-05-07 DIAGNOSIS — I10 ESSENTIAL HYPERTENSION: ICD-10-CM

## 2023-05-07 DIAGNOSIS — I48.19 PERSISTENT ATRIAL FIBRILLATION (HCC): ICD-10-CM

## 2023-05-08 NOTE — TELEPHONE ENCOUNTER
Received request via: Patient    Was the patient seen in the last year in this department? Yes    Does the patient have an active prescription (recently filled or refills available) for medication(s) requested?  yes    Does the patient have [facility] and need 100 day supply (blood pressure, diabetes and cholesterol meds only)? Patient does not have SCP   Requested Prescriptions    Pending Prescriptions Disp Refills   lisinopril (PRINIVIL) 20 MG Tab 90 Tablet 0    Sig: Take 1 Tablet by mouth every day.

## 2023-05-09 RX ORDER — LISINOPRIL 20 MG/1
20 TABLET ORAL DAILY
Qty: 90 TABLET | Refills: 0 | Status: SHIPPED | OUTPATIENT
Start: 2023-05-09 | End: 2023-05-10 | Stop reason: SDUPTHER

## 2023-05-10 NOTE — PATIENT COMMUNICATION
Received request via: Patient    Was the patient seen in the last year in this department? Yes    Does the patient have an active prescription (recently filled or refills available) for medication(s) requested? Yes (sent recently), however, insurance no longer covers [facility], needs medications re-sent to [facility].     Does the patient have skilled nursing Plus and need 100 day supply (blood pressure, diabetes and cholesterol meds only)? Patient does not have [facility]    Requested Prescriptions     Pending Prescriptions Disp Refills    flecainide (TAMBOCOR) 50 MG tablet 360 Tablet 2     Sig: Take 2 Tablets by mouth 2 times a day.    lisinopril (PRINIVIL) 20 MG Tab 90 Tablet 0     Sig: Take 1 Tablet by mouth every day.

## 2023-05-11 RX ORDER — FLECAINIDE ACETATE 50 MG/1
100 TABLET ORAL 2 TIMES DAILY
Qty: 360 TABLET | Refills: 2 | Status: SHIPPED | OUTPATIENT
Start: 2023-05-11

## 2023-05-11 RX ORDER — LISINOPRIL 20 MG/1
20 TABLET ORAL DAILY
Qty: 90 TABLET | Refills: 0 | Status: SHIPPED | OUTPATIENT
Start: 2023-05-11 | End: 2023-10-05

## 2023-06-01 ENCOUNTER — OFFICE VISIT (OUTPATIENT)
Dept: CARDIOLOGY | Facility: MEDICAL CENTER | Age: 58
End: 2023-06-01
Attending: NURSE PRACTITIONER

## 2023-06-01 ENCOUNTER — OFFICE VISIT (OUTPATIENT)
Dept: MEDICAL GROUP | Facility: MEDICAL CENTER | Age: 58
End: 2023-06-01

## 2023-06-01 VITALS — HEIGHT: 65 IN | BODY MASS INDEX: 48.82 KG/M2 | WEIGHT: 293 LBS

## 2023-06-01 VITALS
BODY MASS INDEX: 48.82 KG/M2 | OXYGEN SATURATION: 90 % | SYSTOLIC BLOOD PRESSURE: 130 MMHG | RESPIRATION RATE: 20 BRPM | HEIGHT: 65 IN | TEMPERATURE: 98.1 F | DIASTOLIC BLOOD PRESSURE: 88 MMHG | WEIGHT: 293 LBS | HEART RATE: 79 BPM

## 2023-06-01 DIAGNOSIS — E66.01 MORBID OBESITY WITH BMI OF 60.0-69.9, ADULT (HCC): ICD-10-CM

## 2023-06-01 DIAGNOSIS — I89.0 LYMPHEDEMA: ICD-10-CM

## 2023-06-01 DIAGNOSIS — N95.0 POSTMENOPAUSAL BLEEDING: ICD-10-CM

## 2023-06-01 DIAGNOSIS — I10 PRIMARY HYPERTENSION: ICD-10-CM

## 2023-06-01 DIAGNOSIS — Z13.220 SCREENING FOR LIPID DISORDERS: ICD-10-CM

## 2023-06-01 DIAGNOSIS — Z91.09 ENVIRONMENTAL ALLERGIES: ICD-10-CM

## 2023-06-01 DIAGNOSIS — I48.91 ATRIAL FIBRILLATION WITH RAPID VENTRICULAR RESPONSE (HCC): ICD-10-CM

## 2023-06-01 DIAGNOSIS — Z02.89 ENCOUNTER FOR COMPLETION OF FORM WITH PATIENT: ICD-10-CM

## 2023-06-01 DIAGNOSIS — N85.8 UTERINE MASS: ICD-10-CM

## 2023-06-01 DIAGNOSIS — R73.09 ELEVATED GLUCOSE: ICD-10-CM

## 2023-06-01 DIAGNOSIS — F41.9 ANXIETY: ICD-10-CM

## 2023-06-01 DIAGNOSIS — E66.01 MORBID OBESITY (HCC): ICD-10-CM

## 2023-06-01 DIAGNOSIS — J45.20 MILD INTERMITTENT ASTHMA, UNSPECIFIED WHETHER COMPLICATED: ICD-10-CM

## 2023-06-01 PROCEDURE — 3075F SYST BP GE 130 - 139MM HG: CPT | Performed by: PHYSICIAN ASSISTANT

## 2023-06-01 PROCEDURE — 99212 OFFICE O/P EST SF 10 MIN: CPT | Performed by: NURSE PRACTITIONER

## 2023-06-01 PROCEDURE — 99215 OFFICE O/P EST HI 40 MIN: CPT | Performed by: PHYSICIAN ASSISTANT

## 2023-06-01 PROCEDURE — 99214 OFFICE O/P EST MOD 30 MIN: CPT | Performed by: NURSE PRACTITIONER

## 2023-06-01 PROCEDURE — 3079F DIAST BP 80-89 MM HG: CPT | Performed by: PHYSICIAN ASSISTANT

## 2023-06-01 RX ORDER — METOPROLOL SUCCINATE 25 MG/1
25 TABLET, EXTENDED RELEASE ORAL NIGHTLY
Qty: 90 TABLET | Refills: 3 | Status: SHIPPED | OUTPATIENT
Start: 2023-06-01 | End: 2024-01-02

## 2023-06-01 RX ORDER — ALBUTEROL SULFATE 90 UG/1
2 AEROSOL, METERED RESPIRATORY (INHALATION) EVERY 6 HOURS PRN
Qty: 8.5 G | Refills: 2 | Status: SHIPPED | OUTPATIENT
Start: 2023-06-01 | End: 2023-12-07

## 2023-06-01 RX ORDER — SERTRALINE HYDROCHLORIDE 25 MG/1
25 TABLET, FILM COATED ORAL DAILY
Qty: 90 TABLET | Refills: 3 | Status: SHIPPED | OUTPATIENT
Start: 2023-06-01 | End: 2024-01-09

## 2023-06-01 ASSESSMENT — FIBROSIS 4 INDEX
FIB4 SCORE: 0.88
FIB4 SCORE: 0.88

## 2023-06-01 NOTE — PROGRESS NOTES
"      Cardiology Clinic Follow-up Note    Date of note:    6/1/2023  Primary Care Provider: Monika Dejesus P.A.-C.    Name:             An Rainey  YOB: 1965  MRN:               [id number]    CC: Follow-up on Afib, Flec therapy    Primary Cardiologist: Dr. Esteban    Patient HPI:   An Rainey is a 57 y.o. female with current medical problems including PAF on Flecainide     Interim History:  Presents today for 6-month follow-up.  Does feels that her heart may have palpitations and go into A-fib on occasion, feels more so under stressful circumstances.  Unfortunately has gained weight over the past 6 months, d/t stress eating.  She denies palpitations, chest pain, shortness of breath, dizziness or syncopal episodes, orthopnea, PND, lower extremity swelling, and recent weight gain.  Experience dyspnea with exertion however this has been her baseline and given her weight problems.  Extra stressed recently due to being fired from her job.    Last office visit note on 12/6/22  Ms. Rainey was last seen in this cardiology office by Dr. Esteban on 10/20/22 with new onset symptomatic atrial fibrillation, and she was started on flecainide 50 mg twice daily 1 week prior to cardioversion which was successful did not occur as patient had already converted to sinus bradycardia.  Her cardioversion was scheduled for 11/17/2022 upon arrival she did describe experiencing chest pressure which had worsened with initiating flecainide, she did describe she felt an elephant on her chest at that time.    Afib only recurred once, since starting flecainide, episode lasted about 5 minutes.    Elquis expensive, $75 monthly but not interested in warfarin and INR checks.  Never followed up with bariatric surgery referral, co-pay too expensive  Starting \"Zac weight loss\" starting in January.    Patient endorses medication compliance.    She denies palpitations, chest pain, shortness of breath, dyspnea on exertion, " dizziness or syncopal episodes, orthopnea, PND, lower extremity swelling, and recent weight gain.     Cardiovascular Risk Factors:  1. Smoking status: never  2. Type II Diabetes Mellitus: no No results found for: HBA1C  3. Hypertension: yes  4. Dyslipidemia: no   Cholesterol,Tot   Date Value Ref Range Status   10/11/2022 167 100 - 199 mg/dL Final     LDL   Date Value Ref Range Status   10/11/2022 101 (H) <100 mg/dL Final     HDL   Date Value Ref Range Status   10/11/2022 41 >=40 mg/dL Final     Triglycerides   Date Value Ref Range Status   10/11/2022 123 0 - 149 mg/dL Final     5. Family history of early Coronary Artery Disease in a first degree relative (Male less than 55 years of age; Female less than 65 years of age): No  6.  Obesity and/or Metabolic Syndrome: BMI 54.75  7. Sedentary lifestyle: yes    Review of systems:  All others systems reviewed and negative except for what is outlined in the above HPI    Past Medical History:   Diagnosis Date    Allergy     Arrhythmia 11/11/2022    Afib    Breath shortness 11/11/2022    With exertion and seasonal allergies.    GERD (gastroesophageal reflux disease)     History of anemia     HTN (hypertension) 04/27/2019    Hypertension     Lymph edema     Bilateral lower extremities and feet.    Seasonal allergies     Snoring      History reviewed. No pertinent surgical history.  Family History   Problem Relation Age of Onset    Cancer Mother     Cancer Father     Cancer Sister     Cancer Maternal Grandmother      Social History     Socioeconomic History    Marital status: Other     Spouse name: Not on file    Number of children: Not on file    Years of education: Not on file    Highest education level: Not on file   Occupational History    Not on file   Tobacco Use    Smoking status: Never    Smokeless tobacco: Never   Vaping Use    Vaping Use: Never used   Substance and Sexual Activity    Alcohol use: Yes     Comment: Rarely    Drug use: No    Sexual activity: Not on file  "  Other Topics Concern    Not on file   Social History Narrative    Not on file     Social Determinants of Health     Financial Resource Strain: Low Risk  (3/18/2021)    Overall Financial Resource Strain (CARDIA)     Difficulty of Paying Living Expenses: Not hard at all   Food Insecurity: No Food Insecurity (3/18/2021)    Hunger Vital Sign     Worried About Running Out of Food in the Last Year: Never true     Ran Out of Food in the Last Year: Never true   Transportation Needs: No Transportation Needs (3/18/2021)    PRAPARE - Transportation     Lack of Transportation (Medical): No     Lack of Transportation (Non-Medical): No   Physical Activity: Not on file   Stress: Not on file   Social Connections: Not on file   Intimate Partner Violence: Not on file   Housing Stability: Not on file     No Known Allergies  Current Outpatient Medications   Medication Sig Dispense Refill    apixaban (ELIQUIS) 5mg Tab Take 1 Tablet by mouth 2 times a day. 180 Tablet 3    metoprolol SR (TOPROL XL) 25 MG TABLET SR 24 HR Take 1 Tablet by mouth every evening. 90 Tablet 3    flecainide (TAMBOCOR) 50 MG tablet Take 2 Tablets by mouth 2 times a day. 360 Tablet 2    lisinopril (PRINIVIL) 20 MG Tab Take 1 Tablet by mouth every day. 90 Tablet 0    amLODIPine (NORVASC) 10 MG Tab Take 1 Tablet by mouth every day for 90 days. 90 Tablet 2    albuterol 108 (90 Base) MCG/ACT Aero Soln inhalation aerosol Inhale 2 Puffs every 6 hours as needed for Shortness of Breath.      Cholecalciferol (VITAMIN D) 2000 UNIT Tab Take 500 Units by mouth every day. Vitamin D + K      fluticasone (FLONASE) 50 MCG/ACT nasal spray Administer 1 Spray into affected nostril(S) every day.       No current facility-administered medications for this visit.       Physical Exam:  Ambulatory Vitals  Ht 1.651 m (5' 5\")   Wt (!) 165 kg (364 lb)    BP Readings from Last 4 Encounters:   06/01/23 (P) 132/76   04/06/23 134/86   02/27/23 128/78   12/06/22 110/74       Weight/BMI: Body " mass index is 60.57 kg/m².  Wt Readings from Last 4 Encounters:   06/01/23 (!) 165 kg (364 lb)   04/06/23 (!) 165 kg (363 lb)   02/27/23 (!) 159 kg (350 lb)   12/06/22 (!) 160 kg (353 lb)       General: No apparent distress. Well nourished. BMI 54  Neck: No JVD. No caroid bruits, trachea midline  Lungs: CTAB. Normal effort, without crackles/rhonchi, no wheezing  Heart: RRR. Normal S1/S2, no murmur, diminished heart ones, no rub. Chronic lymphedema to bilateral lower extremity edema. 2+ radial pulses, 2+ DT pulses  Ext: No clubbing or cyanosis.  Abdomen: soft, non tender, non distended, no markus hepatomegaly.  Neurological: No focal deficits, no facial asymmetry.  Normal speech.  Psychiatric: Appropriate affect, alert and oriented x 4.   Skin: Warm and dry, no rash.    Lab Data Review:  Lab Results   Component Value Date/Time    CHOLSTRLTOT 167 10/11/2022 02:13 AM     (H) 10/11/2022 02:13 AM    HDL 41 10/11/2022 02:13 AM    TRIGLYCERIDE 123 10/11/2022 02:13 AM       Lab Results   Component Value Date/Time    SODIUM 142 11/15/2022 08:45 AM    POTASSIUM 4.6 11/15/2022 08:45 AM    CHLORIDE 105 11/15/2022 08:45 AM    CO2 28 11/15/2022 08:45 AM    GLUCOSE 104 (H) 11/15/2022 08:45 AM    BUN 14 11/15/2022 08:45 AM    CREATININE 0.63 11/15/2022 08:45 AM     Lab Results   Component Value Date/Time    ALKPHOSPHAT 74 10/10/2022 01:15 PM    ASTSGOT 22 10/10/2022 01:15 PM    ALTSGPT 20 10/10/2022 01:15 PM    TBILIRUBIN 0.4 10/10/2022 01:15 PM      Lab Results   Component Value Date/Time    WBC 6.4 11/15/2022 08:45 AM       Cardiac Imaging and Procedures Review:      EKG 11/17/22: My Personal interpretation reveals SB 48    Echo 10/10/22:  Mild concentric left ventricular hypertrophy.  Grossly normal left ventricular systolic function.  Dilated right ventricle with akinesis of the free wall- findings are   suspicious for pulmonary embolism.     Stress Test 4/27/2019:   NUCLEAR IMAGING INTERPRETATION   No evidence of  significant jeopardized viable myocardium or prior myocardial    infarction.   Normal left ventricular ejection fraction, and wall motion.   Mild dilatation of LV cavity.      Sinus rhythm.   Nondiagnostic ECG portion of regadenoson nuclear stress test.   ECG INTERPRETATION   Nondiagnostic ECG portion of regadenoson nuclear stress test.    Assessment and Clinical Decision Makin. Atrial fibrillation with rapid ventricular response (HCC)  apixaban (ELIQUIS) 5mg Tab    metoprolol SR (TOPROL XL) 25 MG TABLET SR 24 HR      2. Environmental allergies        3. Morbid obesity with BMI of 50.0-59.9, adult (HCC)        4. Lymphedema        5. Primary hypertension            The following treatment plan was discussed    Morbid obesity, BMI 60.6  Lymphedema  -Discussed diet changes and encouraged weight loss  -Patient motivated to start weight loss program she is not working  -Not interested in bariatric surgery currently  -Recommend MANUEL hose to help with lymphedema as well as drastic weight loss  -elevate legs when able, consider lymphedema pump  -Referral to sleep medicine for EVERARDO assessment    PAF  -At low-dose metoprolol SR 25 mg nightly  -Continue flecainide 50 mg twice daily  -Surveillance EKG/follow-ups  -Continue Eliquis 5 mg bid  -[id number] Score 2    HTN  -Slightly elevated in office today  -Increase antihypertension management if at next visit BP remains > goal of 130/80   -Continue amlodipine 10 mg daily and lisinopril 20 mg daily    Plan reviewed in detail with the patient, verbalizes understanding and is in agreement.  Pt is to follow up with myself in 6 months  Encouraged Pt to follow up with us over the phone or electronically using my [employer] as cardiac issues/concerns arise.      PLEASE NOTE: This dictation was created using voice recognition software. I have made every reasonable attempt to correct obvious errors, but I expect that there are errors of grammar and possibly content that I did not  discover before finalizing the note.       AURELIANO Dozier.   [facility]  [phone]

## 2023-06-01 NOTE — PROGRESS NOTES
"Subjective:   An Rainey is a 57 y.o. female here today for     HPI:Patient is here to discuss:    Problem   Postmenopausal Bleeding    March 30, 2023: Transvaginal pelvic ultrasound showed endometrial thickening which could be residual blood or hyperplasia versus malignancy.There is a 1.3 x 1.4 cm hypoechoic lesion in the uterine fundus on the left side.  I contacted patient and let her know that she needs to make appoint with gynecology.  I put in a referral for gynecology that has been authorized back in February.    April 6, 2023: Patient comes in to go over labs.  Has not had any vaginal bleeding since last appointment    June 1, 2023.  Patient has been having trouble getting through to the gynecologist and is also been told that their schedule is full.  I am putting in an urgent referral.  She has not had bleeding in the last month per se but some tissue type discharge intermittently        Morbid Obesity (Hcc)    Referral for Dr edwards was given, but copay was too expensive    3/1/23: gained 15lbs.  Weight is at 364 up about 15 pounds           ROS   No headaches, chest pain, no shortness of breath, abdominal pain, nausea, or vomiting.  All other systems were reviewed and are negative or noted as positive in the HPI.     Objective:     /88 (BP Location: Right arm, Patient Position: Sitting, BP Cuff Size: Large adult)   Pulse 79   Temp 36.7 °C (98.1 °F) (Temporal)   Resp 20   Ht 1.651 m (5' 5\")   Wt (!) 165 kg (364 lb 4.8 oz)   SpO2 90%  Body mass index is 60.62 kg/m².     Physical Exam:  General: Patient appears well-nourished, well-hydrated, nontoxic  HEENT, normocephalic atraumatic, PERRLA, extraocular movements intact, nares are patent and clear  Neck: No visible masses, thyromegaly or abnormalities noted  Cardiovascular.  Sitting comfortably without visible signs of edema  Lungs: No cyanosis noted, nondyspneic  Skin: Well perfused without evidence of rash or lesions  Neurological: " Cranial nerves II through XII intact, normal gait  Musculoskeletal: Normal range of motion, normal strength and no deficit noted     Clinical Course/Lab Analysis:  No visits with results within 1 Month(s) from this visit.   Latest known visit with results is:   Admission on 11/17/2022, Discharged on 11/17/2022   Component Date Value Ref Range Status    Report 11/17/2022    Final       Assessment and Plan:   The following treatment plan was discussed.  Signs and symptoms for which to return were discussed with patient at length.  Patient verbalized understanding.    1. Atrial fibrillation with rapid ventricular response (HCC)        2. Postmenopausal bleeding  Referral to Gynecology    CBC WITH DIFFERENTIAL      3. Uterine mass  Referral to Gynecology    CBC WITH DIFFERENTIAL      4. Mild intermittent asthma, unspecified whether complicated  albuterol 108 (90 Base) MCG/ACT Aero Soln inhalation aerosol      5. Anxiety  sertraline (ZOLOFT) 25 MG tablet    TSH WITH REFLEX TO FT4      6. Primary hypertension  Comp Metabolic Panel      7. Morbid obesity (HCC)        8. Screening for lipid disorders  LIPID PANEL      9. Elevated glucose  HEMOGLOBIN A1C      10. Encounter for completion of form with patient            1.Chronic and unstable: Patient just saw cardiology this morning.  She is going to continue Eliquis 5 mg tablets and flecainide 50 mg 2 tablets by mouth 2 times daily.  However metoprolol 25 mg sustained-release was added today to take in the evening.  We will monitor    2 and 3..  Subacute and unstable: Patient was evaluated for postmenopausal bleeding and pelvic ultrasound showed hypoechoic lesion in the uterine fundus.  I put in a referral back in March.  Patient is try to make an appointment and has not been able to.  Of note they did offer her a same-day appointment at 1 point but she was not able to leave work.  I discussed the importance of getting into gynecology again today and put in another referral  in an urgent fashion as she needs an endometrial biopsy to further investigate if this is a polyp versus fibroid versus neoplastic process    5.  New and unstable: Patient just lost her job.  We will start sertraline.  She is rightfully upset visibly at the way she was terminated    6 6.  Blood pressure is well controlled continue amlodipine can always modify.  Continue lisinopril 20 mg daily    7.  Chronic and unstable: Patient has appointment with weight loss specialist Dr. Jung on June 22, 2023.    10.  Patient also brings form completion for DMV for disabled persons license plate.  I did fill this out as a convenience during this appointment..  Please see scanned section    My total time spent caring for the patient on the day of the encounter was 50 minutes.     This does not include time spent on separately billable procedures/tests.   Followup:    Please note that this dictation was created using voice recognition software. I have made every reasonable attempt to correct obvious errors, but I expect that there are errors of grammar and possibly content that I did not discover before finalizing the note.

## 2023-06-30 ENCOUNTER — TELEPHONE (OUTPATIENT)
Dept: OBGYN | Facility: CLINIC | Age: 58
End: 2023-06-30

## 2023-06-30 NOTE — TELEPHONE ENCOUNTER
Called pt to verify ins, pt stated [employer]. I informed pt [employer] on file comes back as ineligible as of 5/31. Pt asked to be self pay and I informed pt that in order to be self pay we need to r/s her to the [facility] where we take self pay pts. Pt told me to cancel the appointment and hung up.

## 2023-07-03 ENCOUNTER — APPOINTMENT (OUTPATIENT)
Dept: OBGYN | Facility: CLINIC | Age: 58
End: 2023-07-03
Payer: MEDICAID

## 2023-09-01 ENCOUNTER — APPOINTMENT (OUTPATIENT)
Dept: RADIOLOGY | Facility: MEDICAL CENTER | Age: 58
End: 2023-09-01
Attending: EMERGENCY MEDICINE
Payer: MEDICAID

## 2023-09-01 ENCOUNTER — HOSPITAL ENCOUNTER (OUTPATIENT)
Facility: MEDICAL CENTER | Age: 58
End: 2023-09-03
Attending: EMERGENCY MEDICINE | Admitting: HOSPITALIST
Payer: MEDICAID

## 2023-09-01 DIAGNOSIS — D50.9 IRON DEFICIENCY ANEMIA, UNSPECIFIED IRON DEFICIENCY ANEMIA TYPE: ICD-10-CM

## 2023-09-01 DIAGNOSIS — L03.116 CELLULITIS OF LEFT LOWER EXTREMITY: ICD-10-CM

## 2023-09-01 DIAGNOSIS — L03.115 CELLULITIS OF RIGHT LOWER EXTREMITY: ICD-10-CM

## 2023-09-01 DIAGNOSIS — I89.0 LYMPHEDEMA: ICD-10-CM

## 2023-09-01 DIAGNOSIS — L03.115 CELLULITIS OF RIGHT LEG: ICD-10-CM

## 2023-09-01 PROBLEM — I48.91 AF (ATRIAL FIBRILLATION) (HCC): Status: ACTIVE | Noted: 2023-09-01

## 2023-09-01 LAB
ALBUMIN SERPL BCP-MCNC: 4.1 G/DL (ref 3.2–4.9)
ALBUMIN/GLOB SERPL: 1.2 G/DL
ALP SERPL-CCNC: 84 U/L (ref 30–99)
ALT SERPL-CCNC: 13 U/L (ref 2–50)
ANION GAP SERPL CALC-SCNC: 10 MMOL/L (ref 7–16)
AST SERPL-CCNC: 19 U/L (ref 12–45)
BASOPHILS # BLD AUTO: 0.5 % (ref 0–1.8)
BASOPHILS # BLD: 0.04 K/UL (ref 0–0.12)
BILIRUB SERPL-MCNC: 0.3 MG/DL (ref 0.1–1.5)
BUN SERPL-MCNC: 10 MG/DL (ref 8–22)
CALCIUM ALBUM COR SERPL-MCNC: 8.7 MG/DL (ref 8.5–10.5)
CALCIUM SERPL-MCNC: 8.8 MG/DL (ref 8.4–10.2)
CHLORIDE SERPL-SCNC: 101 MMOL/L (ref 96–112)
CO2 SERPL-SCNC: 25 MMOL/L (ref 20–33)
CREAT SERPL-MCNC: 0.74 MG/DL (ref 0.5–1.4)
EKG IMPRESSION: NORMAL
EOSINOPHIL # BLD AUTO: 0.21 K/UL (ref 0–0.51)
EOSINOPHIL NFR BLD: 2.5 % (ref 0–6.9)
ERYTHROCYTE [DISTWIDTH] IN BLOOD BY AUTOMATED COUNT: 52.4 FL (ref 35.9–50)
GFR SERPLBLD CREATININE-BSD FMLA CKD-EPI: 95 ML/MIN/1.73 M 2
GLOBULIN SER CALC-MCNC: 3.3 G/DL (ref 1.9–3.5)
GLUCOSE SERPL-MCNC: 99 MG/DL (ref 65–99)
HCT VFR BLD AUTO: 39.1 % (ref 37–47)
HGB BLD-MCNC: 12.1 G/DL (ref 12–16)
IMM GRANULOCYTES # BLD AUTO: 0.03 K/UL (ref 0–0.11)
IMM GRANULOCYTES NFR BLD AUTO: 0.4 % (ref 0–0.9)
LACTATE SERPL-SCNC: 0.8 MMOL/L (ref 0.5–2)
LYMPHOCYTES # BLD AUTO: 1.77 K/UL (ref 1–4.8)
LYMPHOCYTES NFR BLD: 20.7 % (ref 22–41)
MCH RBC QN AUTO: 26 PG (ref 27–33)
MCHC RBC AUTO-ENTMCNC: 30.9 G/DL (ref 32.2–35.5)
MCV RBC AUTO: 84.1 FL (ref 81.4–97.8)
MONOCYTES # BLD AUTO: 0.71 K/UL (ref 0–0.85)
MONOCYTES NFR BLD AUTO: 8.3 % (ref 0–13.4)
NEUTROPHILS # BLD AUTO: 5.78 K/UL (ref 1.82–7.42)
NEUTROPHILS NFR BLD: 67.6 % (ref 44–72)
NRBC # BLD AUTO: 0 K/UL
NRBC BLD-RTO: 0 /100 WBC (ref 0–0.2)
PLATELET # BLD AUTO: 289 K/UL (ref 164–446)
PMV BLD AUTO: 10.4 FL (ref 9–12.9)
POTASSIUM SERPL-SCNC: 4.2 MMOL/L (ref 3.6–5.5)
PROT SERPL-MCNC: 7.4 G/DL (ref 6–8.2)
RBC # BLD AUTO: 4.65 M/UL (ref 4.2–5.4)
SCCMEC + MECA PNL NOSE NAA+PROBE: NEGATIVE
SODIUM SERPL-SCNC: 136 MMOL/L (ref 135–145)
TROPONIN T SERPL-MCNC: 7 NG/L (ref 6–19)
TROPONIN T SERPL-MCNC: <6 NG/L (ref 6–19)
TROPONIN T SERPL-MCNC: <6 NG/L (ref 6–19)
WBC # BLD AUTO: 8.5 K/UL (ref 4.8–10.8)

## 2023-09-01 PROCEDURE — 83605 ASSAY OF LACTIC ACID: CPT

## 2023-09-01 PROCEDURE — 99223 1ST HOSP IP/OBS HIGH 75: CPT | Performed by: HOSPITALIST

## 2023-09-01 PROCEDURE — 84484 ASSAY OF TROPONIN QUANT: CPT | Mod: 91

## 2023-09-01 PROCEDURE — 36415 COLL VENOUS BLD VENIPUNCTURE: CPT

## 2023-09-01 PROCEDURE — 700101 HCHG RX REV CODE 250: Performed by: EMERGENCY MEDICINE

## 2023-09-01 PROCEDURE — 700105 HCHG RX REV CODE 258: Performed by: EMERGENCY MEDICINE

## 2023-09-01 PROCEDURE — 94760 N-INVAS EAR/PLS OXIMETRY 1: CPT

## 2023-09-01 PROCEDURE — 87040 BLOOD CULTURE FOR BACTERIA: CPT

## 2023-09-01 PROCEDURE — 96375 TX/PRO/DX INJ NEW DRUG ADDON: CPT

## 2023-09-01 PROCEDURE — 93005 ELECTROCARDIOGRAM TRACING: CPT | Performed by: EMERGENCY MEDICINE

## 2023-09-01 PROCEDURE — 87641 MR-STAPH DNA AMP PROBE: CPT

## 2023-09-01 PROCEDURE — A9270 NON-COVERED ITEM OR SERVICE: HCPCS | Performed by: HOSPITALIST

## 2023-09-01 PROCEDURE — G0378 HOSPITAL OBSERVATION PER HR: HCPCS

## 2023-09-01 PROCEDURE — 93005 ELECTROCARDIOGRAM TRACING: CPT

## 2023-09-01 PROCEDURE — 96365 THER/PROPH/DIAG IV INF INIT: CPT

## 2023-09-01 PROCEDURE — 85025 COMPLETE CBC W/AUTO DIFF WBC: CPT

## 2023-09-01 PROCEDURE — 73590 X-RAY EXAM OF LOWER LEG: CPT | Mod: RT

## 2023-09-01 PROCEDURE — 700111 HCHG RX REV CODE 636 W/ 250 OVERRIDE (IP): Performed by: EMERGENCY MEDICINE

## 2023-09-01 PROCEDURE — 700102 HCHG RX REV CODE 250 W/ 637 OVERRIDE(OP): Performed by: HOSPITALIST

## 2023-09-01 PROCEDURE — 99285 EMERGENCY DEPT VISIT HI MDM: CPT

## 2023-09-01 PROCEDURE — 71045 X-RAY EXAM CHEST 1 VIEW: CPT

## 2023-09-01 PROCEDURE — 80053 COMPREHEN METABOLIC PANEL: CPT

## 2023-09-01 RX ORDER — OXYCODONE HYDROCHLORIDE 10 MG/1
10 TABLET ORAL
Status: DISCONTINUED | OUTPATIENT
Start: 2023-09-01 | End: 2023-09-03 | Stop reason: HOSPADM

## 2023-09-01 RX ORDER — BISACODYL 10 MG
10 SUPPOSITORY, RECTAL RECTAL
Status: DISCONTINUED | OUTPATIENT
Start: 2023-09-01 | End: 2023-09-03 | Stop reason: HOSPADM

## 2023-09-01 RX ORDER — FLECAINIDE ACETATE 100 MG/1
100 TABLET ORAL 2 TIMES DAILY
Status: DISCONTINUED | OUTPATIENT
Start: 2023-09-01 | End: 2023-09-03 | Stop reason: HOSPADM

## 2023-09-01 RX ORDER — AMLODIPINE BESYLATE 5 MG/1
10 TABLET ORAL EVERY MORNING
Status: DISCONTINUED | OUTPATIENT
Start: 2023-09-02 | End: 2023-09-03 | Stop reason: HOSPADM

## 2023-09-01 RX ORDER — AMLODIPINE BESYLATE 10 MG/1
10 TABLET ORAL EVERY MORNING
COMMUNITY
End: 2024-02-21

## 2023-09-01 RX ORDER — DOXYCYCLINE 100 MG/1
100 TABLET ORAL EVERY 12 HOURS
Status: DISCONTINUED | OUTPATIENT
Start: 2023-09-01 | End: 2023-09-03 | Stop reason: HOSPADM

## 2023-09-01 RX ORDER — OXYCODONE HYDROCHLORIDE 5 MG/1
5 TABLET ORAL
Status: DISCONTINUED | OUTPATIENT
Start: 2023-09-01 | End: 2023-09-03 | Stop reason: HOSPADM

## 2023-09-01 RX ORDER — ACETAMINOPHEN 325 MG/1
650 TABLET ORAL EVERY 6 HOURS PRN
Status: DISCONTINUED | OUTPATIENT
Start: 2023-09-01 | End: 2023-09-03 | Stop reason: HOSPADM

## 2023-09-01 RX ORDER — POLYETHYLENE GLYCOL 3350 17 G/17G
1 POWDER, FOR SOLUTION ORAL
Status: DISCONTINUED | OUTPATIENT
Start: 2023-09-01 | End: 2023-09-03 | Stop reason: HOSPADM

## 2023-09-01 RX ORDER — PROMETHAZINE HYDROCHLORIDE 25 MG/1
12.5-25 TABLET ORAL EVERY 4 HOURS PRN
Status: DISCONTINUED | OUTPATIENT
Start: 2023-09-01 | End: 2023-09-03 | Stop reason: HOSPADM

## 2023-09-01 RX ORDER — ONDANSETRON 4 MG/1
4 TABLET, ORALLY DISINTEGRATING ORAL EVERY 4 HOURS PRN
Status: DISCONTINUED | OUTPATIENT
Start: 2023-09-01 | End: 2023-09-03 | Stop reason: HOSPADM

## 2023-09-01 RX ORDER — HYDROMORPHONE HYDROCHLORIDE 1 MG/ML
0.5 INJECTION, SOLUTION INTRAMUSCULAR; INTRAVENOUS; SUBCUTANEOUS
Status: DISCONTINUED | OUTPATIENT
Start: 2023-09-01 | End: 2023-09-03 | Stop reason: HOSPADM

## 2023-09-01 RX ORDER — ONDANSETRON 2 MG/ML
4 INJECTION INTRAMUSCULAR; INTRAVENOUS EVERY 4 HOURS PRN
Status: DISCONTINUED | OUTPATIENT
Start: 2023-09-01 | End: 2023-09-03 | Stop reason: HOSPADM

## 2023-09-01 RX ORDER — LISINOPRIL 20 MG/1
20 TABLET ORAL DAILY
Status: DISCONTINUED | OUTPATIENT
Start: 2023-09-02 | End: 2023-09-03 | Stop reason: HOSPADM

## 2023-09-01 RX ORDER — CALCIUM CARBONATE 500 MG/1
500 TABLET, CHEWABLE ORAL 3 TIMES DAILY PRN
Status: DISCONTINUED | OUTPATIENT
Start: 2023-09-01 | End: 2023-09-03 | Stop reason: HOSPADM

## 2023-09-01 RX ORDER — AMOXICILLIN 250 MG
2 CAPSULE ORAL 2 TIMES DAILY
Status: DISCONTINUED | OUTPATIENT
Start: 2023-09-01 | End: 2023-09-03 | Stop reason: HOSPADM

## 2023-09-01 RX ORDER — PROCHLORPERAZINE EDISYLATE 5 MG/ML
5-10 INJECTION INTRAMUSCULAR; INTRAVENOUS EVERY 4 HOURS PRN
Status: DISCONTINUED | OUTPATIENT
Start: 2023-09-01 | End: 2023-09-03 | Stop reason: HOSPADM

## 2023-09-01 RX ORDER — ALBUTEROL SULFATE 90 UG/1
2 AEROSOL, METERED RESPIRATORY (INHALATION)
Status: DISCONTINUED | OUTPATIENT
Start: 2023-09-01 | End: 2023-09-03 | Stop reason: HOSPADM

## 2023-09-01 RX ORDER — FLUTICASONE PROPIONATE 50 MCG
1 SPRAY, SUSPENSION (ML) NASAL DAILY
Status: DISCONTINUED | OUTPATIENT
Start: 2023-09-01 | End: 2023-09-01

## 2023-09-01 RX ORDER — B-COMPLEX WITH VITAMIN C
1 TABLET ORAL EVERY MORNING
COMMUNITY

## 2023-09-01 RX ORDER — PROMETHAZINE HYDROCHLORIDE 25 MG/1
12.5-25 SUPPOSITORY RECTAL EVERY 4 HOURS PRN
Status: DISCONTINUED | OUTPATIENT
Start: 2023-09-01 | End: 2023-09-03 | Stop reason: HOSPADM

## 2023-09-01 RX ORDER — SULFAMETHOXAZOLE AND TRIMETHOPRIM 800; 160 MG/1; MG/1
1 TABLET ORAL EVERY 12 HOURS
Status: DISCONTINUED | OUTPATIENT
Start: 2023-09-01 | End: 2023-09-01

## 2023-09-01 RX ORDER — METOPROLOL SUCCINATE 25 MG/1
25 TABLET, EXTENDED RELEASE ORAL NIGHTLY
Status: DISCONTINUED | OUTPATIENT
Start: 2023-09-01 | End: 2023-09-03 | Stop reason: HOSPADM

## 2023-09-01 RX ORDER — CEFTRIAXONE 2 G/1
2000 INJECTION, POWDER, FOR SOLUTION INTRAMUSCULAR; INTRAVENOUS ONCE
Status: COMPLETED | OUTPATIENT
Start: 2023-09-01 | End: 2023-09-01

## 2023-09-01 RX ADMIN — CEFTRIAXONE SODIUM 2000 MG: 2 INJECTION, POWDER, FOR SOLUTION INTRAMUSCULAR; INTRAVENOUS at 12:31

## 2023-09-01 RX ADMIN — ACETAMINOPHEN 650 MG: 325 TABLET ORAL at 17:49

## 2023-09-01 RX ADMIN — DOXYCYCLINE 100 MG: 100 TABLET, FILM COATED ORAL at 17:49

## 2023-09-01 RX ADMIN — APIXABAN 5 MG: 5 TABLET, FILM COATED ORAL at 17:49

## 2023-09-01 RX ADMIN — FLECAINIDE ACETATE 100 MG: 100 TABLET ORAL at 17:49

## 2023-09-01 RX ADMIN — SERTRALINE HYDROCHLORIDE 25 MG: 50 TABLET ORAL at 17:49

## 2023-09-01 RX ADMIN — VANCOMYCIN HYDROCHLORIDE 3 G: 5 INJECTION, POWDER, LYOPHILIZED, FOR SOLUTION INTRAVENOUS at 13:11

## 2023-09-01 ASSESSMENT — ENCOUNTER SYMPTOMS
FOCAL WEAKNESS: 0
EYE REDNESS: 0
EYE DISCHARGE: 0
NERVOUS/ANXIOUS: 0
SHORTNESS OF BREATH: 0
COUGH: 0
BRUISES/BLEEDS EASILY: 0
VOMITING: 0
FLANK PAIN: 0
ABDOMINAL PAIN: 0
CHILLS: 0
MYALGIAS: 0
FEVER: 0
STRIDOR: 0

## 2023-09-01 ASSESSMENT — COGNITIVE AND FUNCTIONAL STATUS - GENERAL
SUGGESTED CMS G CODE MODIFIER MOBILITY: CH
MOBILITY SCORE: 24
SUGGESTED CMS G CODE MODIFIER DAILY ACTIVITY: CH
DAILY ACTIVITIY SCORE: 24

## 2023-09-01 ASSESSMENT — LIFESTYLE VARIABLES
EVER HAD A DRINK FIRST THING IN THE MORNING TO STEADY YOUR NERVES TO GET RID OF A HANGOVER: NO
EVER FELT BAD OR GUILTY ABOUT YOUR DRINKING: NO
HAVE YOU EVER FELT YOU SHOULD CUT DOWN ON YOUR DRINKING: NO
HOW MANY TIMES IN THE PAST YEAR HAVE YOU HAD 5 OR MORE DRINKS IN A DAY: 0
AVERAGE NUMBER OF DAYS PER WEEK YOU HAVE A DRINK CONTAINING ALCOHOL: 2
TOTAL SCORE: 0
HAVE PEOPLE ANNOYED YOU BY CRITICIZING YOUR DRINKING: NO
ON A TYPICAL DAY WHEN YOU DRINK ALCOHOL HOW MANY DRINKS DO YOU HAVE: 1
CONSUMPTION TOTAL: NEGATIVE
ALCOHOL_USE: YES
TOTAL SCORE: 0
TOTAL SCORE: 0

## 2023-09-01 ASSESSMENT — PAIN DESCRIPTION - PAIN TYPE
TYPE: ACUTE PAIN

## 2023-09-01 ASSESSMENT — FIBROSIS 4 INDEX
FIB4 SCORE: 1
FIB4 SCORE: 0.88

## 2023-09-01 NOTE — PROGRESS NOTES
4 Eyes Skin Assessment Completed by CELESTINO Mariee and CELESTINO Nolan.    Head Scab  Ears WDL  Nose WDL  Mouth WDL  Neck WDL  Breast/Chest WDL  Shoulder Blades WDL  Spine WDL  (R) Arm/Elbow/Hand WDL  (L) Arm/Elbow/Hand WDL  Abdomen WDL  Groin WDL  Scrotum/Coccyx/Buttocks WDL  (R) Leg Redness, Swelling, and Edema  (L) Leg Swelling, small wound to L tibial front   (R) Heel/Foot/Toe Swelling  (L) Heel/Foot/Toe Swelling          Devices In Places Blood Pressure Cuff and Pulse Ox      Interventions In Place Pillows and Pressure Redistribution Mattress    Possible Skin Injury Yes    Pictures Uploaded Into Epic Yes  Wound Consult Placed Yes  RN Wound Prevention Protocol Ordered Yes

## 2023-09-01 NOTE — ED NOTES
Pt on Eliquis for a-fib  Right lower leg 3+ pitting edema with moderate redness. Leg is hot to touch with multiple raised red bumps.   No recent fever or chills. Generalized malaise.

## 2023-09-01 NOTE — DISCHARGE PLANNING
Met with pt who said she is independent with ADLs and IADLs. Sometimes she uses a cane to help her with ambulation. She lives with her BF and his children and grandchildren . They live in a 3 level home but she mostly stays on the first floor and sleeps in the living room. She normally does not have any mobility problems but when she climbs the stairs, she becomes short of breath.     PCP is Monika NARVAEZ.  Pharmacy is [facility].     Care Transition Team Assessment    Information Source  Orientation Level: Oriented X4  Information Given By: Patient  Who is responsible for making decisions for patient? : Patient    Readmission Evaluation  Is this a readmission?: No    Elopement Risk  Legal Hold: No  Ambulatory or Self Mobile in Wheelchair: Yes  Disoriented: No  Psychiatric Symptoms: None  History of Wandering: No  Elopement this Admit: No  Vocalizing Wanting to Leave: No  Displays Behaviors, Body Language Wanting to Leave: No-Not at Risk for Elopement  Elopement Risk: Not at Risk for Elopement    Interdisciplinary Discharge Planning  Does Admitting Nurse Feel This Could be a Complex Discharge?: No  Primary Care Physician: SOLANGE Lilly  Lives with - Patient's Self Care Capacity: Adult Children, Significant Other  Patient or legal guardian wants to designate a caregiver: No  Support Systems: Spouse / Significant Other, Family Member(s)  Housing / Facility: 3 Story House  Do You Take your Prescribed Medications Regularly: Yes  Able to Return to Previous ADL's: Yes  Mobility Issues: No  Prior Services: Home-Independent  Patient Prefers to be Discharged to:: Home  Assistance Needed: No  Durable Medical Equipment: Not Applicable    Discharge Preparedness  What is your plan after discharge?: Home with help  What are your discharge supports?: Spouse  Prior Functional Level: Ambulatory, Drives Self, Independent with Activities of Daily Living, Independent with Medication Management, Uses Cane  Difficulity with  ADLs: None  Difficulity with IADLs: None    Functional Assesment  Prior Functional Level: Ambulatory, Drives Self, Independent with Activities of Daily Living, Independent with Medication Management, Uses Cane    Finances  Financial Barriers to Discharge: No  Prescription Coverage: Yes    Vision / Hearing Impairment  Vision Impairment : Yes  Right Eye Vision: Impaired, Wears Glasses  Left Eye Vision: Impaired, Wears Glasses  Hearing Impairment : No    Values / Beliefs / Concerns  Values / Beliefs Concerns : No    Advance Directive  Advance Directive?: None    Domestic Abuse  Have you ever been the victim of abuse or violence?: No  Physical Abuse or Sexual Abuse: No  Verbal Abuse or Emotional Abuse: No  Possible Abuse/Neglect Reported to:: Not Applicable    Psychological Assessment  History of Substance Abuse: None    Discharge Risks or Barriers  Discharge risks or barriers?: No  Patient risk factors: Other (comment)    Anticipated Discharge Information  Discharge Disposition: Discharged to home/self care (01)

## 2023-09-01 NOTE — DIETARY
NUTRITION SERVICES: BMI - Pt with BMI >40 (=Body mass index is 60.17 kg/m².), Class III obesity. Weight loss counseling not appropriate in acute care setting. RECOMMEND - If appropriate at DC please refer to outpatient nutrition services for weight management.

## 2023-09-01 NOTE — PROGRESS NOTES
Received report from CELESTINO Renteria. Pt to room via [facility], able to ambulate to bed. AAOx4, VSS on room air. Pt denies pain or nausea. Redness/swelling noted to RLE, small wound/swelling noted to LLE. Iv abx infusing. Updated on plan of care and answered any questions. Safety precautions in place, pt educated to call for assistance.

## 2023-09-01 NOTE — ED NOTES
Pharmacy Medication Reconciliation      ~Medication reconciliation updated and complete per patient at bedside   ~Allergies have been verified   ~No oral ABX within the last 30 days  ~Patient home pharmacy :  [facility]

## 2023-09-01 NOTE — ED PROVIDER NOTES
"ED Provider Note    CHIEF COMPLAINT  Chief Complaint   Patient presents with    Leg Pain     Pt states that she believes that she has an infection to her right leg. Pt has history of lymphedema.  Pt reports that her leg is swollen and \"burning\"   Pt also reports a chronic wound to her left leg that she is concerned about    Chest Pain     History Afib   States that chest pain started last night   Denies increased shortness of breath but does state that she is starting to get pain into her shoulders         EXTERNAL RECORDS REVIEWED  Outpatient Notes family medicine note from 6/1/2023 reviewed.  Patient with post menopausal bleeding     HPI/ROS  LIMITATION TO HISTORY   Select: : None  OUTSIDE HISTORIAN(S):  none    An Rainey is a 57 y.o. female with history of hypertension, lymphedema, GERD, and A-fib on Eliquis who presents for right lower extremity redness/infection.    Patient denies trauma but reports having a sore of some kind on the right calf in the back.  Patient states she noted the redness 3 days ago.  She also reports itching and burning sensation.  She has had an open wound on the opposite/left leg for 1 month.    Patient reported chest pain yesterday when she was having a \"meltdown at work.\"  This occurred at noon.  Chest pain persisted for 4 hours.  She has had \"faint\" pain since then.  It is a shooting pain from the left upper chest into the shoulder.  She denies associated nausea, vomiting, diaphoresis, worsening shortness of breath (she reports chronic shortness of breath.)  Patient has chronic nasal congestion.  She denies cough, hemoptysis, history of PE/DVT.    PAST MEDICAL HISTORY   has a past medical history of Allergy, Arrhythmia (11/11/2022), Breath shortness (11/11/2022), GERD (gastroesophageal reflux disease), History of anemia, HTN (hypertension) (04/27/2019), Hypertension, Lymph edema, Seasonal allergies, and Snoring.    SURGICAL HISTORY  patient denies any surgical " "history    FAMILY HISTORY  Family History   Problem Relation Age of Onset    Cancer Mother     Cancer Father     Cancer Sister     Cancer Maternal Grandmother        SOCIAL HISTORY  Social History     Tobacco Use    Smoking status: Never    Smokeless tobacco: Never   Vaping Use    Vaping Use: Never used   Substance and Sexual Activity    Alcohol use: Yes     Comment: Rarely    Drug use: No    Sexual activity: Not on file       CURRENT MEDICATIONS  Home Medications       Reviewed by Gogo Thomas (Pharmacy Tech) on 09/01/23 at 1223  Med List Status: Complete     Medication Last Dose Status   albuterol 108 (90 Base) MCG/ACT Aero Soln inhalation aerosol 8/31/2023 Active   amLODIPine (NORVASC) 10 MG Tab 9/1/2023 Active   apixaban (ELIQUIS) 5mg Tab 9/1/2023 Active   B Complex Vitamins (VITAMIN B COMPLEX) Tab 9/1/2023 Active   BIOTIN PO 9/1/2023 Active   CALCIUM PO 9/1/2023 Active   flecainide (TAMBOCOR) 50 MG tablet 9/1/2023 Active   fluticasone (FLONASE) 50 MCG/ACT nasal spray 9/1/2023 Active   lisinopril (PRINIVIL) 20 MG Tab 9/1/2023 Active   metoprolol SR (TOPROL XL) 25 MG TABLET SR 24 HR 8/31/2023 Active   Multiple Vitamin (MULTIVITAMIN PO) 9/1/2023 Active   Omega-3 Fatty Acids (FISH OIL PO) 9/1/2023 Active   sertraline (ZOLOFT) 25 MG tablet 8/31/2023 Active   VITAMIN D PO 9/1/2023 Active                    ALLERGIES  No Known Allergies    PHYSICAL EXAM  VITAL SIGNS: BP (!) 149/64   Pulse 62   Temp 36.2 °C (97.1 °F) (Temporal)   Resp (!) 21   Ht 1.651 m (5' 5\")   Wt (!) 164 kg (362 lb 3.5 oz)   LMP 04/01/2019 (Approximate)   SpO2 93%   BMI 60.28 kg/m²    General:  WD female with elevated BMI, nontoxic appearing in NAD; A+Ox3; V/S as above; elevated BP; afebrile, not tachycardic or hypoxic  Skin: warm and dry; good color; no rash  HEENT: NCAT; EOMs intact; PERRL; no scleral icterus   Neck: FROM  Cardiovascular: Regular heart rate and rhythm.  No murmurs, rubs, or gallops; pulses 2+ bilaterally radially " and DP areas; minimal chest wall tenderness with palpation of the left upper pectoralis area  Lungs: No respiratory distress or tachypnea; Clear to auscultation with good air movement bilaterally.  No wheezes, rhonchi, or rales.   Abdomen: BS present; soft; NTND; no rebound, guarding, or rigidity.  No organomegaly or pulsatile mass  Extremities: Bilateral lower extremity lymphedema noted; there is a 2 cm open wound on the anterior aspect of the left lower leg with mild yellowish drainage and mild surrounding erythema; the right lower extremity is warm and uniformly erythematous with scattered circular/punctate erythematous lesions; no crepitus or induration; posterior aspect of the right lower leg has a Band-Aid which was removed when exposed a sore/wound with yellowish drainage  Neurologic: CNs III-XII grossly intact; speech clear; distal sensation intact  Psychiatric: Appropriate affect, normal mood      DIAGNOSTIC STUDIES / PROCEDURES  EKG  I have independently interpreted this EKG. no acute ST changes.  Sinus rhythm.      LABS  Results for orders placed or performed during the hospital encounter of 09/01/23   CBC with Differential   Result Value Ref Range    WBC 8.5 4.8 - 10.8 K/uL    RBC 4.65 4.20 - 5.40 M/uL    Hemoglobin 12.1 12.0 - 16.0 g/dL    Hematocrit 39.1 37.0 - 47.0 %    MCV 84.1 81.4 - 97.8 fL    MCH 26.0 (L) 27.0 - 33.0 pg    MCHC 30.9 (L) 32.2 - 35.5 g/dL    RDW 52.4 (H) 35.9 - 50.0 fL    Platelet Count 289 164 - 446 K/uL    MPV 10.4 9.0 - 12.9 fL    Neutrophils-Polys 67.60 44.00 - 72.00 %    Lymphocytes 20.70 (L) 22.00 - 41.00 %    Monocytes 8.30 0.00 - 13.40 %    Eosinophils 2.50 0.00 - 6.90 %    Basophils 0.50 0.00 - 1.80 %    Immature Granulocytes 0.40 0.00 - 0.90 %    Nucleated RBC 0.00 0.00 - 0.20 /100 WBC    Neutrophils (Absolute) 5.78 1.82 - 7.42 K/uL    Lymphs (Absolute) 1.77 1.00 - 4.80 K/uL    Monos (Absolute) 0.71 0.00 - 0.85 K/uL    Eos (Absolute) 0.21 0.00 - 0.51 K/uL    Baso  (Absolute) 0.04 0.00 - 0.12 K/uL    Immature Granulocytes (abs) 0.03 0.00 - 0.11 K/uL    NRBC (Absolute) 0.00 K/uL   Complete Metabolic Panel (CMP)   Result Value Ref Range    Sodium 136 135 - 145 mmol/L    Potassium 4.2 3.6 - 5.5 mmol/L    Chloride 101 96 - 112 mmol/L    Co2 25 20 - 33 mmol/L    Anion Gap 10.0 7.0 - 16.0    Glucose 99 65 - 99 mg/dL    Bun 10 8 - 22 mg/dL    Creatinine 0.74 0.50 - 1.40 mg/dL    Calcium 8.8 8.4 - 10.2 mg/dL    Correct Calcium 8.7 8.5 - 10.5 mg/dL    AST(SGOT) 19 12 - 45 U/L    ALT(SGPT) 13 2 - 50 U/L    Alkaline Phosphatase 84 30 - 99 U/L    Total Bilirubin 0.3 0.1 - 1.5 mg/dL    Albumin 4.1 3.2 - 4.9 g/dL    Total Protein 7.4 6.0 - 8.2 g/dL    Globulin 3.3 1.9 - 3.5 g/dL    A-G Ratio 1.2 g/dL   Troponins NOW   Result Value Ref Range    Troponin T 7 6 - 19 ng/L   LACTIC ACID   Result Value Ref Range    Lactic Acid 0.8 0.5 - 2.0 mmol/L   ESTIMATED GFR   Result Value Ref Range    GFR (CKD-EPI) 95 >60 mL/min/1.73 m 2   EKG   Result Value Ref Range    Report       [facility]    Test Date:  2023  Pt Name:    CESARIO BOSS                  Department: [facility]  MRN:        [id number]                      Room:  Gender:     Female                       Technician: [id number]  :        [id number]                   Requested By:ER TRIAGE PROTOCOL  Order #:    [id number]                    Reading MD: BRIDGET EDMONDSON MD    Measurements  Intervals                                Axis  Rate:       58                           P:          31  WI:         212                          QRS:        30  QRSD:       108                          T:          30  QT:         449  QTc:        442    Interpretive Statements  Sinus rhythm  Prolonged WI interval  Low voltage, precordial leads  Compared to ECG 2022 10:27:43  first degree AV block  no acute ST changes  Electronically Signed On 2023 11:34:04 PDT by BRIDGET EDMONDSON MD           RADIOLOGY  I have  independently interpreted the diagnostic imaging associated with this visit and am waiting the final reading from the radiologist.   My preliminary interpretation is as follows:   no soft tissue gas  No focal consolidation    Radiologist interpretation:   DX-CHEST-PORTABLE (1 VIEW)   Final Result      Mild cardiomegaly.      DX-TIBIA AND FIBULA RIGHT   Final Result      1.  No evidence of fracture or dislocation.      2.  Diffuse soft tissue swelling. No evidence of soft tissue gas.            COURSE & MEDICAL DECISION MAKING    ED Observation Status? No; Patient does not meet criteria for ED Observation.     INITIAL ASSESSMENT, COURSE AND PLAN  Care Narrative: This is a 57-year-old with history of lymphedema who presents with right lower extremity cellulitis.  Patient is afebrile and nontoxic-appearing.  She does not have pain out of proportion to exam.  I do not suspect necrotizing fasciitis but x-ray was obtained to evaluate for subcutaneous gas or bony involvement.  This was negative.  Patient's labs demonstrate a normal white blood cell count.  Blood cultures were drawn.  Rocephin ordered.    Patient also reported chest pain since yesterday.    EKG demonstrates no A-fib.  No acute ST changes.    Troponin negative.  Patient with a normal white blood cell count.  Lactic acid normal.  X-ray demonstrates no soft tissue gas.  Chest x-ray demonstrates no focal consolidation but did show mild cardiomegaly.    1:02 PM  I discussed the case with Dr. Gomez from the hospitalist service who agrees to admit the patient.      DISPOSITION AND DISCUSSIONS  I have discussed management of the patient with the following physicians and NORA's:    Patricia    FINAL DIAGNOSIS  1. Cellulitis of right leg           Electronically signed by: Kayleigh Holly M.D., 9/1/2023 11:31 AM

## 2023-09-01 NOTE — ED TRIAGE NOTES
"Chief Complaint   Patient presents with    Leg Pain     Pt states that she believes that she has an infection to her right leg. Pt has history of lymphedema.  Pt reports that her leg is swollen and \"burning\"   Pt also reports a chronic wound to her left leg that she is concerned about    Chest Pain     History Afib   States that chest pain started last night   Denies increased shortness of breath but does state that she is starting to get pain into her shoulders       BP (!) 149/64   Pulse 62   Temp 36.2 °C (97.1 °F) (Temporal)   Resp (!) 21   Ht 1.651 m (5' 5\")   Wt (!) 164 kg (362 lb 3.5 oz)   LMP 04/01/2019 (Approximate)   SpO2 93%   BMI 60.28 kg/m²     "

## 2023-09-01 NOTE — ASSESSMENT & PLAN NOTE
Patient complained of ongoing pain to legs  - I will continue IV antibiotics, changing ceftriaxone to Ancef, continue oral doxycycline.  -Patient has significant BLE lymphedema complicating her ability to receive antibiotics.

## 2023-09-01 NOTE — H&P
"Hospital Medicine History & Physical Note    Date of Service  9/1/2023    Primary Care Physician  Monika Dejesus P.A.-C.    Consultants   None      Code Status   Full Code    Chief Complaint    Chief Complaint   Patient presents with    Leg Pain     Pt states that she believes that she has an infection to her right leg. Pt has history of lymphedema.  Pt reports that her leg is swollen and \"burning\"   Pt also reports a chronic wound to her left leg that she is concerned about     History of Presenting Illness  An Rainey is a 57 y.o. female with a past medical history of primary hypertension, elevated BMI, depression and atrial fibrillation who presented 9/1/2023 with chest pain and right lower extremity pain, swelling and redness.  Patient reports that she has not been feeling well over the past 3 days.  She has been having progressively worsening pain swelling and redness of her lower extremity.  The pain is described as having a burning sensation.  She denies noticing any fevers or chills.  Patient did have a brief episode of chest pain/palpitation last night.    I discussed the plan of care with emergency department physician, the patient    Review of Systems  Review of Systems   Constitutional:  Positive for malaise/fatigue. Negative for chills and fever.   Eyes:  Negative for discharge and redness.   Respiratory:  Negative for cough, shortness of breath and stridor.    Cardiovascular:  Negative for leg swelling. Chest pain: Resolved.  Gastrointestinal:  Negative for abdominal pain and vomiting.   Genitourinary:  Negative for flank pain.   Musculoskeletal:  Negative for myalgias.        Right lower extremity redness, swelling and pain   Skin: Negative.    Neurological:  Negative for focal weakness.   Endo/Heme/Allergies:  Does not bruise/bleed easily.   Psychiatric/Behavioral:  The patient is not nervous/anxious.      Past Medical History   has a past medical history of Allergy, Arrhythmia " (11/11/2022), Breath shortness (11/11/2022), GERD (gastroesophageal reflux disease), History of anemia, HTN (hypertension) (04/27/2019), Hypertension, Lymph edema, Seasonal allergies, and Snoring.    Surgical History   has no past surgical history on file.     Family History  family history includes Cancer in her father, maternal grandmother, mother, and sister.      Social History   reports that she has never smoked. She has never used smokeless tobacco. She reports current alcohol use. She reports that she does not use drugs.    Allergies  No Known Allergies    Medications  Prior to Admission Medications   Prescriptions Last Dose Informant Patient Reported? Taking?   B Complex Vitamins (VITAMIN B COMPLEX) Tab 9/1/2023 at 0800 Patient Yes Yes   Sig: Take 1 Tablet by mouth every morning.   BIOTIN PO 9/1/2023 at 0800 Patient Yes Yes   Sig: Take 1 Tablet by mouth every morning.   CALCIUM PO 9/1/2023 at 0800 Patient Yes Yes   Sig: Take 1 Tablet by mouth every morning.   Multiple Vitamin (MULTIVITAMIN PO) 9/1/2023 at 0800 Patient Yes Yes   Sig: Take 1 Tablet by mouth every morning.   Omega-3 Fatty Acids (FISH OIL PO) 9/1/2023 at 0800 Patient Yes Yes   Sig: Take 1 Capsule by mouth every morning.   VITAMIN D PO 9/1/2023 at 0800 Patient Yes No   Sig: Take 1 Tablet by mouth every morning.   albuterol 108 (90 Base) MCG/ACT Aero Soln inhalation aerosol 8/31/2023 at PM Patient No No   Sig: Inhale 2 Puffs every 6 hours as needed for Shortness of Breath.   amLODIPine (NORVASC) 10 MG Tab 9/1/2023 at 0800 Patient Yes Yes   Sig: Take 10 mg by mouth every morning.   apixaban (ELIQUIS) 5mg Tab 9/1/2023 at 0800 Patient No No   Sig: Take 1 Tablet by mouth 2 times a day.   flecainide (TAMBOCOR) 50 MG tablet 9/1/2023 at 0800 Patient No No   Sig: Take 2 Tablets by mouth 2 times a day.   fluticasone (FLONASE) 50 MCG/ACT nasal spray 9/1/2023 at 0800 Patient Yes No   Sig: Administer 1 Spray into affected nostril(S) every day.   lisinopril  (PRINIVIL) 20 MG Tab 9/1/2023 at 0800 Patient No No   Sig: Take 1 Tablet by mouth every day.   metoprolol SR (TOPROL XL) 25 MG TABLET SR 24 HR 8/31/2023 at PM Patient No No   Sig: Take 1 Tablet by mouth every evening.   sertraline (ZOLOFT) 25 MG tablet 8/31/2023 at PM Patient No No   Sig: Take 1 Tablet by mouth every day.      Facility-Administered Medications: None     Physical Exam  Temp:  [36.2 °C (97.1 °F)] 36.2 °C (97.1 °F)  Pulse:  [62] 62  Resp:  [21] 21  BP: (149)/(64) 149/64  SpO2:  [93 %] 93 %  Blood Pressure: (!) 149/64   Temperature: 36.2 °C (97.1 °F)   Pulse: 62   Respiration: (!) 21   Pulse Oximetry: 93 %     Physical Exam  Constitutional:       General: She is not in acute distress.  HENT:      Head: Normocephalic and atraumatic.      Right Ear: External ear normal.      Left Ear: External ear normal.      Nose: No congestion or rhinorrhea.      Mouth/Throat:      Mouth: Mucous membranes are moist.      Pharynx: No oropharyngeal exudate or posterior oropharyngeal erythema.   Eyes:      General: No scleral icterus.        Right eye: No discharge.         Left eye: No discharge.      Conjunctiva/sclera: Conjunctivae normal.      Pupils: Pupils are equal, round, and reactive to light.   Cardiovascular:      Rate and Rhythm: Normal rate and regular rhythm.      Heart sounds:      No friction rub. No gallop.   Pulmonary:      Effort: Pulmonary effort is normal.   Abdominal:      General: Abdomen is flat. There is no distension.      Tenderness: There is no guarding.   Musculoskeletal:         General: Swelling and tenderness present.      Cervical back: Neck supple. No rigidity. No muscular tenderness.      Right lower leg: Edema present.      Left lower leg: Edema present.      Comments: Bilateral lower extremity lymphedema.  Right lower extremity edema, erythema and tenderness   Skin:     Capillary Refill: Capillary refill takes 2 to 3 seconds.      Coloration: Skin is not jaundiced or pale.       "Findings: No bruising or erythema.   Neurological:      Mental Status: She is alert and oriented to person, place, and time.   Psychiatric:         Mood and Affect: Mood normal.         Judgment: Judgment normal.       Laboratory:  Recent Labs     09/01/23  1148   WBC 8.5   RBC 4.65   HEMOGLOBIN 12.1   HEMATOCRIT 39.1   MCV 84.1   MCH 26.0*   MCHC 30.9*   RDW 52.4*   PLATELETCT 289   MPV 10.4     Recent Labs     09/01/23  1148   SODIUM 136   POTASSIUM 4.2   CHLORIDE 101   CO2 25   GLUCOSE 99   BUN 10   CREATININE 0.74   CALCIUM 8.8     Recent Labs     09/01/23  1148   ALTSGPT 13   ASTSGOT 19   ALKPHOSPHAT 84   TBILIRUBIN 0.3   GLUCOSE 99         No results for input(s): \"NTPROBNP\" in the last 72 hours.      Recent Labs     09/01/23  1148 09/01/23  1401   TROPONINT 7 <6     Imaging:   DX-CHEST-PORTABLE (1 VIEW)   Final Result      Mild cardiomegaly.      DX-TIBIA AND FIBULA RIGHT   Final Result      1.  No evidence of fracture or dislocation.      2.  Diffuse soft tissue swelling. No evidence of soft tissue gas.        I personally reviewed patient EKG shows sinus rhythm cardia with a rate of 58, there is no ST elevation, there is flattening of T wave in lead III, V3-V5.  QTc is 442.    Assessment/Plan:   Justification for Admission Status  I anticipate this patient is appropriate for observation status at this time because possible discharge after one midnight     Patient will need a Med/Surg bed on MEDICAL service. The patient has cellulitis     * Cellulitis of right lower extremity- (present on admission)  Assessment & Plan  I will start ceftriaxone and doxycycline, follow on cultures and sensitivities     AF (atrial fibrillation) (HCC)- (present on admission)  Assessment & Plan  Resume apixaban   Resume metoprolol with hold parameters      Morbid obesity with BMI of 60.0-69.9, adult (HCC)- (present on admission)  Assessment & Plan  Recommend diet and      HTN (hypertension)- (present on admission)  Assessment & " Plan  Resume home amlodipine, lisinopril and metoprolol with hold parameters     Chest pain- (present on admission)  Assessment & Plan  EKG shows, shows sinus rhythm cardia with a rate of 58, there is no ST elevation, there is flattening of T wave in lead III, V3-V5.  QTc is 442.  Initial troponin is within normal limits, I will trend  I ordered Stat EKG and troponin for recurrence of chest pain.   I will place on continuous cardiac monitoring.    Anxiety- (present on admission)  Assessment & Plan  Resume sertraline     GERD (gastroesophageal reflux disease)- (present on admission)  Assessment & Plan  Tums as needed       Depression- (present on admission)  Assessment & Plan  Resume sertraline       VTE prophylaxis: SCDs/TEDs and therapeutic anticoagulation with apixaban

## 2023-09-02 PROBLEM — E83.42 HYPOMAGNESEMIA: Status: ACTIVE | Noted: 2023-09-02

## 2023-09-02 LAB
ALBUMIN SERPL BCP-MCNC: 3.4 G/DL (ref 3.2–4.9)
ALBUMIN/GLOB SERPL: 1.2 G/DL
ALP SERPL-CCNC: 67 U/L (ref 30–99)
ALT SERPL-CCNC: 9 U/L (ref 2–50)
ANION GAP SERPL CALC-SCNC: 11 MMOL/L (ref 7–16)
AST SERPL-CCNC: 13 U/L (ref 12–45)
BILIRUB SERPL-MCNC: 0.3 MG/DL (ref 0.1–1.5)
BUN SERPL-MCNC: 9 MG/DL (ref 8–22)
CALCIUM ALBUM COR SERPL-MCNC: 8.7 MG/DL (ref 8.5–10.5)
CALCIUM SERPL-MCNC: 8.2 MG/DL (ref 8.4–10.2)
CHLORIDE SERPL-SCNC: 103 MMOL/L (ref 96–112)
CO2 SERPL-SCNC: 23 MMOL/L (ref 20–33)
CREAT SERPL-MCNC: 0.65 MG/DL (ref 0.5–1.4)
ERYTHROCYTE [DISTWIDTH] IN BLOOD BY AUTOMATED COUNT: 53.9 FL (ref 35.9–50)
GFR SERPLBLD CREATININE-BSD FMLA CKD-EPI: 103 ML/MIN/1.73 M 2
GLOBULIN SER CALC-MCNC: 2.8 G/DL (ref 1.9–3.5)
GLUCOSE SERPL-MCNC: 99 MG/DL (ref 65–99)
HCT VFR BLD AUTO: 35.3 % (ref 37–47)
HGB BLD-MCNC: 10.8 G/DL (ref 12–16)
MAGNESIUM SERPL-MCNC: 1.9 MG/DL (ref 1.5–2.5)
MCH RBC QN AUTO: 25.8 PG (ref 27–33)
MCHC RBC AUTO-ENTMCNC: 30.6 G/DL (ref 32.2–35.5)
MCV RBC AUTO: 84.4 FL (ref 81.4–97.8)
PLATELET # BLD AUTO: 255 K/UL (ref 164–446)
PMV BLD AUTO: 10.9 FL (ref 9–12.9)
POTASSIUM SERPL-SCNC: 3.6 MMOL/L (ref 3.6–5.5)
PROT SERPL-MCNC: 6.2 G/DL (ref 6–8.2)
RBC # BLD AUTO: 4.18 M/UL (ref 4.2–5.4)
SODIUM SERPL-SCNC: 137 MMOL/L (ref 135–145)
WBC # BLD AUTO: 7.3 K/UL (ref 4.8–10.8)

## 2023-09-02 PROCEDURE — 36415 COLL VENOUS BLD VENIPUNCTURE: CPT

## 2023-09-02 PROCEDURE — 99233 SBSQ HOSP IP/OBS HIGH 50: CPT | Performed by: INTERNAL MEDICINE

## 2023-09-02 PROCEDURE — 85027 COMPLETE CBC AUTOMATED: CPT

## 2023-09-02 PROCEDURE — 700105 HCHG RX REV CODE 258: Performed by: HOSPITALIST

## 2023-09-02 PROCEDURE — G0378 HOSPITAL OBSERVATION PER HR: HCPCS

## 2023-09-02 PROCEDURE — 94760 N-INVAS EAR/PLS OXIMETRY 1: CPT

## 2023-09-02 PROCEDURE — A9270 NON-COVERED ITEM OR SERVICE: HCPCS | Performed by: HOSPITALIST

## 2023-09-02 PROCEDURE — 700102 HCHG RX REV CODE 250 W/ 637 OVERRIDE(OP): Performed by: HOSPITALIST

## 2023-09-02 PROCEDURE — 83735 ASSAY OF MAGNESIUM: CPT

## 2023-09-02 PROCEDURE — 80053 COMPREHEN METABOLIC PANEL: CPT

## 2023-09-02 PROCEDURE — 700111 HCHG RX REV CODE 636 W/ 250 OVERRIDE (IP): Mod: JZ | Performed by: HOSPITALIST

## 2023-09-02 PROCEDURE — 82728 ASSAY OF FERRITIN: CPT

## 2023-09-02 PROCEDURE — 700105 HCHG RX REV CODE 258: Performed by: INTERNAL MEDICINE

## 2023-09-02 PROCEDURE — 700111 HCHG RX REV CODE 636 W/ 250 OVERRIDE (IP): Performed by: INTERNAL MEDICINE

## 2023-09-02 RX ADMIN — AMLODIPINE BESYLATE 10 MG: 5 TABLET ORAL at 05:24

## 2023-09-02 RX ADMIN — SERTRALINE HYDROCHLORIDE 25 MG: 50 TABLET ORAL at 17:59

## 2023-09-02 RX ADMIN — METOPROLOL SUCCINATE 25 MG: 25 TABLET, EXTENDED RELEASE ORAL at 20:27

## 2023-09-02 RX ADMIN — APIXABAN 5 MG: 5 TABLET, FILM COATED ORAL at 05:25

## 2023-09-02 RX ADMIN — CEFAZOLIN 2 G: 2 INJECTION, POWDER, FOR SOLUTION INTRAMUSCULAR; INTRAVENOUS at 21:56

## 2023-09-02 RX ADMIN — DOXYCYCLINE 100 MG: 100 TABLET, FILM COATED ORAL at 17:59

## 2023-09-02 RX ADMIN — APIXABAN 5 MG: 5 TABLET, FILM COATED ORAL at 17:59

## 2023-09-02 RX ADMIN — LISINOPRIL 20 MG: 20 TABLET ORAL at 05:25

## 2023-09-02 RX ADMIN — FLECAINIDE ACETATE 100 MG: 100 TABLET ORAL at 17:59

## 2023-09-02 RX ADMIN — FLECAINIDE ACETATE 100 MG: 100 TABLET ORAL at 05:25

## 2023-09-02 RX ADMIN — CEFTRIAXONE SODIUM 2000 MG: 2 INJECTION, POWDER, FOR SOLUTION INTRAMUSCULAR; INTRAVENOUS at 05:33

## 2023-09-02 RX ADMIN — DOXYCYCLINE 100 MG: 100 TABLET, FILM COATED ORAL at 05:24

## 2023-09-02 ASSESSMENT — ENCOUNTER SYMPTOMS
HEADACHES: 0
DIZZINESS: 0
BACK PAIN: 0
SHORTNESS OF BREATH: 0
ABDOMINAL PAIN: 0
FEVER: 0
CHILLS: 0
CONSTIPATION: 0
NAUSEA: 0
COUGH: 0
DIARRHEA: 0
VOMITING: 0
PALPITATIONS: 0

## 2023-09-02 ASSESSMENT — CHA2DS2 SCORE
VASCULAR DISEASE: NO
AGE 65 TO 74: NO
HYPERTENSION: YES
SEX: FEMALE
CHA2DS2 VASC SCORE: 2
CHF OR LEFT VENTRICULAR DYSFUNCTION: NO
PRIOR STROKE OR TIA OR THROMBOEMBOLISM: NO
DIABETES: NO
AGE 75 OR GREATER: NO

## 2023-09-02 ASSESSMENT — PAIN DESCRIPTION - PAIN TYPE
TYPE: ACUTE PAIN
TYPE: ACUTE PAIN

## 2023-09-02 NOTE — PROGRESS NOTES
"Hospital Medicine Daily Progress Note    Date of Service  9/2/2023    Chief Complaint  An Rainey is a 57 y.o. female admitted 9/1/2023 with   Chief Complaint   Patient presents with    Leg Pain     Pt states that she believes that she has an infection to her right leg. Pt has history of lymphedema.  Pt reports that her leg is swollen and \"burning\"   Pt also reports a chronic wound to her left leg that she is concerned about    Chest Pain     History Afib   States that chest pain started last night   Denies increased shortness of breath but does state that she is starting to get pain into her shoulders         Hospital Course  No notes on file    Interval Problem Update  Patient complained of ongoing pain to legs  - I will continue IV antibiotics, changing ceftriaxone to Ancef, continue oral doxycycline.  -Patient has significant BLE lymphedema complicating her ability to receive antibiotics.    I have discussed this patient's plan of care and discharge plan at IDT rounds today with Case Management, Nursing, Nursing leadership, and other members of the IDT team.    Consultants/Specialty  none    Code Status  Full Code    Disposition  The patient is not medically cleared for discharge to home or a post-acute facility.  Anticipate discharge to: home with close outpatient follow-up    I have placed the appropriate orders for post-discharge needs.    Review of Systems  Review of Systems   Constitutional:  Negative for chills, fever and malaise/fatigue.   Respiratory:  Negative for cough and shortness of breath.    Cardiovascular:  Negative for chest pain and palpitations.   Gastrointestinal:  Negative for abdominal pain, constipation, diarrhea, nausea and vomiting.   Musculoskeletal:  Negative for back pain and joint pain.   Skin:  Positive for itching and rash.   Neurological:  Negative for dizziness and headaches.   All other systems reviewed and are negative.       Physical Exam  Temp:  [36.2 °C (97.2 " °F)-36.8 °C (98.3 °F)] 36.6 °C (97.9 °F)  Pulse:  [51-61] 60  Resp:  [17-18] 18  BP: (121-165)/(63-87) 147/73  SpO2:  [90 %-93 %] 92 %    Physical Exam  Vitals and nursing note reviewed.   Constitutional:       General: She is not in acute distress.     Appearance: She is obese. She is not ill-appearing.   HENT:      Head: Normocephalic and atraumatic.   Eyes:      General: No scleral icterus.     Extraocular Movements: Extraocular movements intact.   Cardiovascular:      Rate and Rhythm: Normal rate.      Pulses: Normal pulses.      Heart sounds: Normal heart sounds. No murmur heard.     Comments: Difficult to completely auscultate due to body habitus  Pulmonary:      Effort: Pulmonary effort is normal. No respiratory distress.      Breath sounds: Normal breath sounds.      Comments: Difficult to completely auscultate due to body habitus  Abdominal:      General: Abdomen is flat. Bowel sounds are normal. There is no distension.      Palpations: Abdomen is soft.   Musculoskeletal:         General: Normal range of motion.      Cervical back: Normal range of motion and neck supple.      Right lower leg: Edema present.      Left lower leg: Edema present.   Skin:     General: Skin is warm.      Capillary Refill: Capillary refill takes less than 2 seconds.      Coloration: Skin is not jaundiced.      Findings: Lesion (Left anterior tibia nonhealing wound, purulent.  Right leg diffuse rash, erythema, multiple darker pink circular lesions, edematous leg.) present. No erythema.   Neurological:      General: No focal deficit present.      Mental Status: She is alert and oriented to person, place, and time. Mental status is at baseline.      Motor: No weakness.   Psychiatric:         Mood and Affect: Mood normal.         Behavior: Behavior normal.         Thought Content: Thought content normal.         Judgment: Judgment normal.         Fluids    Intake/Output Summary (Last 24 hours) at 9/2/2023 1257  Last data filed at  9/2/2023 1137  Gross per 24 hour   Intake 1177.06 ml   Output --   Net 1177.06 ml       Laboratory  Recent Labs     09/01/23  1148 09/02/23  0235   WBC 8.5 7.3   RBC 4.65 4.18*   HEMOGLOBIN 12.1 10.8*   HEMATOCRIT 39.1 35.3*   MCV 84.1 84.4   MCH 26.0* 25.8*   MCHC 30.9* 30.6*   RDW 52.4* 53.9*   PLATELETCT 289 255   MPV 10.4 10.9     Recent Labs     09/01/23  1148 09/02/23  0235   SODIUM 136 137   POTASSIUM 4.2 3.6   CHLORIDE 101 103   CO2 25 23   GLUCOSE 99 99   BUN 10 9   CREATININE 0.74 0.65   CALCIUM 8.8 8.2*                   Imaging  DX-CHEST-PORTABLE (1 VIEW)   Final Result      Mild cardiomegaly.      DX-TIBIA AND FIBULA RIGHT   Final Result      1.  No evidence of fracture or dislocation.      2.  Diffuse soft tissue swelling. No evidence of soft tissue gas.           Assessment/Plan  * Cellulitis of right lower extremity- (present on admission)  Assessment & Plan  Patient complained of ongoing pain to legs  - I will continue IV antibiotics, changing ceftriaxone to Ancef, continue oral doxycycline.  -Patient has significant BLE lymphedema complicating her ability to receive antibiotics.    Hypomagnesemia  Assessment & Plan  Low serum magnesium levels, 1.9  I am replacing via PO replacements - MagCl  I am repeating labs in AM    AF (atrial fibrillation) ([facility])- (present on admission)  Assessment & Plan  Continue home metoprolol and apixaban  Continue home flecainide    Morbid obesity with BMI of 60.0-69.9, adult ([facility])- (present on admission)  Assessment & Plan  Recommending patient to follow up with their PCP on weight management plan  Recommending polysomnography to PCP given elevated BMI  Counseled patient on weight control, diet management, following up with PCP    Anxiety- (present on admission)  Assessment & Plan  Continue home sertraline    Anemia- (present on admission)  Assessment & Plan  Hx of ANTON in labs from 2020  I ordered iron panel and ferritin    GERD (gastroesophageal reflux disease)-  (present on admission)  Assessment & Plan  Tums as needed       Depression- (present on admission)  Assessment & Plan  Continue home sertraline    HTN (hypertension)- (present on admission)  Assessment & Plan  Continue home amlodipine, lisinopril and metoprolol    Chest pain- (present on admission)  Assessment & Plan  Patient denies chest pain today  Monitor for ongoing chest pain         VTE prophylaxis:    therapeutic anticoagulation with eliquis 5 mg BID      I have performed a physical exam and reviewed and updated ROS and Plan today (9/2/2023). In review of yesterday's note (9/1/2023), there are no changes except as documented above.      Total time spent 51 minutes. I spent greater than 50% of the time for patient care, counseling, and coordination on this date, including unit/floor time, and face-to-face time with the patient as per interval events, my own review of patient's imaging and lab analysis and developing my assessment and plan above.

## 2023-09-02 NOTE — PROGRESS NOTES
Bedside report received from night RN. Assumed care of patient. Daily plan of care discussed. Pt resting comfortably, wakes easily to voice. Pt currently denies any complaints or concerns. 12 hour chart check complete. Hourly rounding in place.

## 2023-09-02 NOTE — ASSESSMENT & PLAN NOTE
Low serum magnesium levels, 1.9  I am replacing via PO replacements - MagCl  I am repeating labs in AM

## 2023-09-02 NOTE — WOUND TEAM
"[facility]  Inpatient Services  Initial Wound and Skin Care Evaluation    Admission Date: 9/1/2023     Last order of IP CONSULT TO WOUND CARE was found on 9/1/2023 from Hospital Encounter on 9/1/2023     HPI, PMH, SH: Reviewed    No past surgical history on file.  Social History     Tobacco Use    Smoking status: Never    Smokeless tobacco: Never   Substance Use Topics    Alcohol use: Yes     Comment: Rarely     Chief Complaint   Patient presents with    Leg Pain     Pt states that she believes that she has an infection to her right leg. Pt has history of lymphedema.  Pt reports that her leg is swollen and \"burning\"   Pt also reports a chronic wound to her left leg that she is concerned about    Chest Pain     History Afib   States that chest pain started last night   Denies increased shortness of breath but does state that she is starting to get pain into her shoulders       Diagnosis: Cellulitis of right lower extremity [L03.115]    Unit where seen by Wound Team: 1102/01     WOUND CONSULT RELATED TO:  left anterior leg     WOUND TEAM PLAN OF CARE - Frequency of Follow-up:   Nursing to follow dressing orders written for wound care. Contact wound team if area fails to progress, deteriorates or with any questions/concerns if something comes up before next scheduled follow up (See below as to whether wound is following and frequency of wound follow up)   Not following, consult as needed  -      WOUND HISTORY:   Admitted for cellulitis of the right lower extremity. Left leg with reoccurring wound that opens when patient's lower extremity edema worsens. Has a hx of lymphedema.        WOUND ASSESSMENT/LDA  Wound 09/01/23 Partial Thickness Wound Pretibial Distal Left (Active)   Date First Assessed/Time First Assessed: 09/01/23 1554   Present on Original Admission: Yes  Hand Hygiene Completed: Yes  Primary Wound Type: Partial Thickness Wound  Location: Pretibial  Wound Orientation: Distal  Laterality: Left "      Assessments 9/2/2023  1:00 PM   Wound Image     Site Assessment Red;Drainage   Periwound Assessment Blistered;Edema   Margins Defined edges;Attached edges   Closure Open to air   Drainage Amount Small   Drainage Description Serous   Treatments Cleansed;Site care   Wound Cleansing Approved Wound Cleanser   Periwound Protectant Skin Protectant Wipes to Periwound   Dressing Status Clean;Dry;Intact   Dressing Changed New   Dressing Cleansing/Solutions Not Applicable   Dressing Options Petrolatum Gauze (yellow);Silicone Adhesive Foam   Dressing Change/Treatment Frequency Daily, and As Needed   NEXT Dressing Change/Treatment Date 09/03/23   NEXT Weekly Photo (Inpatient Only) 09/09/23   Wound Team Following Not following   Non-staged Wound Description Partial thickness        Vascular:    BENJAMIN:   No results found.    Lab Values:    Lab Results   Component Value Date/Time    WBC 7.3 09/02/2023 02:35 AM    RBC 4.18 (L) 09/02/2023 02:35 AM    HEMOGLOBIN 10.8 (L) 09/02/2023 02:35 AM    HEMATOCRIT 35.3 (L) 09/02/2023 02:35 AM    CREACTPROT 2.38 (H) 12/09/2019 09:55 PM    SEDRATEWES 83 (H) 12/09/2019 09:55 PM         Culture Results show:  No results found for this or any previous visit (from the past 720 hour(s)).    Pain Level/Medicated:  Patient denies pain       INTERVENTIONS BY WOUND TEAM:  Chart and images reviewed. Discussed with bedside RN. All areas of concern (based on picture review, LDA review and discussion with bedside RN) have been thoroughly assessed. Documentation of areas based on significant findings. This RN in to assess patient. Performed standard wound care which includes appropriate positioning, dressing removal and non-selective debridement. Pictures and measurements obtained weekly if/when required.    Wound:  Left anterior leg  Preparation for Dressing removal: Removed without difficulty  Cleansed/Non-selectively Debrided with:  Wound cleanser and Gauze  Patience wound: Cleansed with Wound cleanser and  Gauze, Prepped with No Sting  Primary Dressing:  xeroform yellow  Secondary (Outer) Dressing: silicone adhesive foam     Advanced Wound Care Discharge Planning  Number of Clinicians necessary to complete wound care: 1  Is patient requiring IV pain medications for dressing changes:  No   Length of time for dressing change 30 min. (This does not include chart review, pre-medication time, set up, clean up or time spent charting.)    Interdisciplinary consultation: Patient, Bedside RN, N/A.  Pressure injury and staging reviewed with N/A.    EVALUATION / RATIONALE FOR TREATMENT:     Date:  09/02/23  Wound Status:  Initial evaluation    Patient with partial thickness wound to left anterior leg. There is minimal serous drainage to wound bed likely related to patient's lymphedema. Right leg with area of erythema but no open wounds. xeroform (yellow) applied to left leg wound to provide an occlusive dressing that incorporates bacteriostatic protection. Recommend OP lymphedema specialist as patient may benefit from either compression therapy via compression dressings and/or lymphedema pumps. At thi time, patient's anti-biotics for RLE cellulitis was just initiated and she is not a candidate for compression therapy.          Goals: Steady decrease in wound area and depth weekly.    NURSING PLAN OF CARE ORDERS:  Dressing changes: See Dressing Care orders    NUTRITION RECOMMENDATIONS   Wound Team Recommendations:  N/A    DIET ORDERS (From admission to next 24h)       Start     Ordered    09/01/23 1315  Diet Order Diet: Cardiac  ALL MEALS        Question:  Diet:  Answer:  Cardiac    09/01/23 1318                    PREVENTATIVE INTERVENTIONS:    Q shift Raulito - performed per nursing policy  Q shift pressure point assessments - performed per nursing policy    Surface/Positioning  Standard/trauma mattress - Currently in Place  Reposition q 2 hours - Currently in Place    Anticipated discharge plans:  Self/Family Care        Vac  Discharge Needs:  Vac Discharge plan is purely a recommendation from wound team and not a requirement for discharge unless otherwise stated by physician.  Not Applicable Pt not on a wound vac

## 2023-09-02 NOTE — PROGRESS NOTES
Received report from day shift nurseKodi RN. Assumed pt care at 1915.   Pt is awake and oriented, resting comfortably on bed. Pt on room air; no signs of SOB/respiratory distress. Labs noted, VSS. Pt denies pain at this moment.  Needs attended well. Fall precautions in place. Bed at lowest position. Call light and personal belongings within reach.   Plan of care on going, no further concerns as of present.  Hourly rounding in progress.     2214 This RN was informed by CNA of pt's /80, HR 57.  Pt seen awake and on bed. Pt states she just went from the bathroom at that time when BP is checked. Informed pt regarding  metoprolol still out from parameters with HR 57; pt states understanding. Hourly rounding in progress.    0000 Pt sleeping comfortably; even and unlabored breathing noted.    0342 telemonitor tech informed this RN of sustaining HR 47. Pt denies chest pain. BP checked 165/78.  Informed Dr. Sanchez.

## 2023-09-03 VITALS
RESPIRATION RATE: 17 BRPM | TEMPERATURE: 97.4 F | BODY MASS INDEX: 48.82 KG/M2 | OXYGEN SATURATION: 92 % | HEIGHT: 65 IN | WEIGHT: 293 LBS | SYSTOLIC BLOOD PRESSURE: 143 MMHG | HEART RATE: 60 BPM | DIASTOLIC BLOOD PRESSURE: 73 MMHG

## 2023-09-03 LAB
ALBUMIN SERPL BCP-MCNC: 3.2 G/DL (ref 3.2–4.9)
BUN SERPL-MCNC: 8 MG/DL (ref 8–22)
CALCIUM ALBUM COR SERPL-MCNC: 8.9 MG/DL (ref 8.5–10.5)
CALCIUM SERPL-MCNC: 8.3 MG/DL (ref 8.4–10.2)
CHLORIDE SERPL-SCNC: 104 MMOL/L (ref 96–112)
CO2 SERPL-SCNC: 23 MMOL/L (ref 20–33)
CREAT SERPL-MCNC: 0.6 MG/DL (ref 0.5–1.4)
ERYTHROCYTE [DISTWIDTH] IN BLOOD BY AUTOMATED COUNT: 53.7 FL (ref 35.9–50)
FERRITIN SERPL-MCNC: 40.5 NG/ML (ref 10–291)
GFR SERPLBLD CREATININE-BSD FMLA CKD-EPI: 105 ML/MIN/1.73 M 2
GLUCOSE SERPL-MCNC: 112 MG/DL (ref 65–99)
HCT VFR BLD AUTO: 35.5 % (ref 37–47)
HGB BLD-MCNC: 10.8 G/DL (ref 12–16)
IRON SATN MFR SERPL: 7 % (ref 15–55)
IRON SERPL-MCNC: 20 UG/DL (ref 40–170)
MAGNESIUM SERPL-MCNC: 2 MG/DL (ref 1.5–2.5)
MCH RBC QN AUTO: 25.9 PG (ref 27–33)
MCHC RBC AUTO-ENTMCNC: 30.4 G/DL (ref 32.2–35.5)
MCV RBC AUTO: 85.1 FL (ref 81.4–97.8)
PHOSPHATE SERPL-MCNC: 4.1 MG/DL (ref 2.5–4.5)
PLATELET # BLD AUTO: 254 K/UL (ref 164–446)
PMV BLD AUTO: 10.7 FL (ref 9–12.9)
POTASSIUM SERPL-SCNC: 3.8 MMOL/L (ref 3.6–5.5)
RBC # BLD AUTO: 4.17 M/UL (ref 4.2–5.4)
SODIUM SERPL-SCNC: 138 MMOL/L (ref 135–145)
TIBC SERPL-MCNC: 304 UG/DL (ref 250–450)
UIBC SERPL-MCNC: 284 UG/DL (ref 110–370)
WBC # BLD AUTO: 7.1 K/UL (ref 4.8–10.8)

## 2023-09-03 PROCEDURE — 83735 ASSAY OF MAGNESIUM: CPT

## 2023-09-03 PROCEDURE — 83540 ASSAY OF IRON: CPT

## 2023-09-03 PROCEDURE — 85027 COMPLETE CBC AUTOMATED: CPT

## 2023-09-03 PROCEDURE — 99239 HOSP IP/OBS DSCHRG MGMT >30: CPT | Performed by: INTERNAL MEDICINE

## 2023-09-03 PROCEDURE — 80069 RENAL FUNCTION PANEL: CPT

## 2023-09-03 PROCEDURE — 83550 IRON BINDING TEST: CPT

## 2023-09-03 PROCEDURE — 94760 N-INVAS EAR/PLS OXIMETRY 1: CPT

## 2023-09-03 PROCEDURE — G0378 HOSPITAL OBSERVATION PER HR: HCPCS

## 2023-09-03 PROCEDURE — 36415 COLL VENOUS BLD VENIPUNCTURE: CPT

## 2023-09-03 PROCEDURE — A9270 NON-COVERED ITEM OR SERVICE: HCPCS | Performed by: HOSPITALIST

## 2023-09-03 PROCEDURE — A9270 NON-COVERED ITEM OR SERVICE: HCPCS | Performed by: INTERNAL MEDICINE

## 2023-09-03 PROCEDURE — 700102 HCHG RX REV CODE 250 W/ 637 OVERRIDE(OP): Performed by: INTERNAL MEDICINE

## 2023-09-03 PROCEDURE — 700102 HCHG RX REV CODE 250 W/ 637 OVERRIDE(OP): Performed by: HOSPITALIST

## 2023-09-03 PROCEDURE — 700111 HCHG RX REV CODE 636 W/ 250 OVERRIDE (IP): Performed by: INTERNAL MEDICINE

## 2023-09-03 PROCEDURE — 700105 HCHG RX REV CODE 258: Performed by: INTERNAL MEDICINE

## 2023-09-03 RX ORDER — CEPHALEXIN 500 MG/1
1000 CAPSULE ORAL 4 TIMES DAILY
Qty: 40 CAPSULE | Refills: 0 | Status: ACTIVE | OUTPATIENT
Start: 2023-09-03 | End: 2023-09-08

## 2023-09-03 RX ORDER — FERROUS SULFATE 325(65) MG
325 TABLET ORAL
Status: DISCONTINUED | OUTPATIENT
Start: 2023-09-03 | End: 2023-09-03 | Stop reason: HOSPADM

## 2023-09-03 RX ORDER — FERROUS SULFATE 325(65) MG
325 TABLET ORAL
Qty: 45 TABLET | Refills: 1 | Status: SHIPPED | OUTPATIENT
Start: 2023-09-05 | End: 2023-12-04

## 2023-09-03 RX ORDER — DOXYCYCLINE 100 MG/1
100 TABLET ORAL EVERY 12 HOURS
Qty: 10 TABLET | Refills: 0 | Status: ACTIVE | OUTPATIENT
Start: 2023-09-03 | End: 2023-09-08

## 2023-09-03 RX ORDER — ASCORBIC ACID 500 MG
500 TABLET ORAL
Qty: 45 TABLET | Refills: 1 | Status: SHIPPED | OUTPATIENT
Start: 2023-09-05 | End: 2023-12-04

## 2023-09-03 RX ORDER — ASCORBIC ACID 500 MG
500 TABLET ORAL
Status: DISCONTINUED | OUTPATIENT
Start: 2023-09-03 | End: 2023-09-03 | Stop reason: HOSPADM

## 2023-09-03 RX ADMIN — OXYCODONE HYDROCHLORIDE AND ACETAMINOPHEN 500 MG: 500 TABLET ORAL at 07:59

## 2023-09-03 RX ADMIN — FERROUS SULFATE TAB 325 MG (65 MG ELEMENTAL FE) 325 MG: 325 (65 FE) TAB at 07:59

## 2023-09-03 RX ADMIN — APIXABAN 5 MG: 5 TABLET, FILM COATED ORAL at 04:45

## 2023-09-03 RX ADMIN — LISINOPRIL 20 MG: 20 TABLET ORAL at 04:45

## 2023-09-03 RX ADMIN — CEFAZOLIN 2 G: 2 INJECTION, POWDER, FOR SOLUTION INTRAMUSCULAR; INTRAVENOUS at 04:48

## 2023-09-03 RX ADMIN — FLECAINIDE ACETATE 100 MG: 100 TABLET ORAL at 04:45

## 2023-09-03 RX ADMIN — DOXYCYCLINE 100 MG: 100 TABLET, FILM COATED ORAL at 04:44

## 2023-09-03 RX ADMIN — AMLODIPINE BESYLATE 10 MG: 5 TABLET ORAL at 04:45

## 2023-09-03 ASSESSMENT — PAIN DESCRIPTION - PAIN TYPE
TYPE: ACUTE PAIN
TYPE: ACUTE PAIN

## 2023-09-03 ASSESSMENT — FIBROSIS 4 INDEX: FIB4 SCORE: 0.94

## 2023-09-03 NOTE — DISCHARGE INSTRUCTIONS
[facility]:  [facility]  [address]  [facility] [address]    [facility][1] has Lymphedema Services.  [place]: [phone]  [facility]: [phone]    Becka Ruano PT  [facility] specializing in lymphedema  [address]   [address]   [facility] [address]   [phone]    Geo Castro M.D. Plastic Surgery  [facility]  [address]  [address]  [facility] [id number] [phone]    [facility]  [address], [address]  [facility] [address]  [facility] Lymp[facility]ema  [phone]    Wound Care Instructions:  LEFT LEG ANTERIOR: Remove old dressing. Cleanse wound with saline or wound cleanser and gauze. Pat dry. Apply no sting barrier film around the wound. Cut a piece of Xeroform (yellow Petrolatum) gauze and apply over open wound. Secure in place with silicone adhesive foam. Change daily and as needed for dislodgement and or drainage saturation.

## 2023-09-03 NOTE — DISCHARGE SUMMARY
"Discharge Summary    CHIEF COMPLAINT ON ADMISSION  Chief Complaint   Patient presents with    Leg Pain     Pt states that she believes that she has an infection to her right leg. Pt has history of lymphedema.  Pt reports that her leg is swollen and \"burning\"   Pt also reports a chronic wound to her left leg that she is concerned about    Chest Pain     History Afib   States that chest pain started last night   Denies increased shortness of breath but does state that she is starting to get pain into her shoulders         Reason for Admission  Right Leg Pain     Admission Date  9/1/2023    CODE STATUS  Full Code    HPI & HOSPITAL COURSE  This is \"An Rainey is a 57 y.o. female with a past medical history of primary hypertension, elevated BMI, depression and atrial fibrillation who presented 9/1/2023 with chest pain and right lower extremity pain, swelling and redness.  Patient reports that she has not been feeling well over the past 3 days.  She has been having progressively worsening pain swelling and redness of her lower extremity.  The pain is described as having a burning sensation.  She denies noticing any fevers or chills.  Patient did have a brief episode of chest pain/palpitation last night.\" (As per admitting physician Dr. Gomez).    Patient was given IV vancomycin and IV ceftriaxone from admission.  Vancomycin was changed to oral doxycycline.  Ceftriaxone after 2 days was changed to IV cefazolin.  Patient had good improvement of her right leg cellulitis and left leg wound.  Wound care team had evaluated patient and recommended for limb edema clinic.    I have given a referral for wound care and PT/lymphedema clinics.  I have given information on locations in [place] including for [facility].  Upon discharge patient is to continue doxycycline and Keflex for 7 more days.  For Keflex I have prescribed 1 g 4 times a day given her weight, lymphedema and moderate to severe nature of her wounds.    Patient " understands these directions and agreed for discharge home.    Therefore, she is discharged in good and stable condition to home with close outpatient follow-up.    The patient recovered much more quickly than anticipated on admission.    Discharge Date  09/3/2023    FOLLOW UP ITEMS POST DISCHARGE  With PCP on 9/7/23, wound care clinic and lymphedema clinic    DISCHARGE DIAGNOSES  Principal Problem:    Cellulitis of right lower extremity (POA: Yes)  Active Problems:    HTN (hypertension) (POA: Yes)      Overview: 2/8/22: stable on lisinopril 20mg and amlodipine 10mg daily.    Depression (POA: Yes)    GERD (gastroesophageal reflux disease) (POA: Yes)    Lymphedema (POA: Yes)    Anemia (POA: Yes)    Anxiety (POA: Yes)    Morbid obesity with BMI of 60.0-69.9, adult (HCC) (POA: Yes)    AF (atrial fibrillation) (HCC) (POA: Yes)    Hypomagnesemia (POA: Clinically Undetermined)  Resolved Problems:    Chest pain (POA: Yes)      FOLLOW UP  Future Appointments   Date Time Provider Department Center   9/7/2023  9:20 AM Monika Dejesus P.A.-C. [facility]   9/21/2023  8:00 AM Louis Hadley M.D. [facility] None   12/5/2023  9:00 AM TITO Barragan [facility] None     Monika Dejesus P.A.-C.  [address]  [address]  [facility] [id number] [phone]    Go on 9/7/2023  Please continue to follow-up.  Please extend oral antibiotics if improvement is slow or change antibiotics if worsening.  Can consider oral Augmentin, Bactrim or if needed for IV please send to ER.    [facility] INFECTIOUS DISEASE WOUND CARE  [address]  [facility] [id number] [phone]  Call in 1 week(s)  Please call  to see if your referral has been approved    [facility] PHYSICAL MED  [address]  [facility] [address] [phone]  Call in 1 week(s)  Please call to see if your referral has been approved for lymphedema clinic follow-up.      MEDICATIONS ON DISCHARGE     Medication List        START taking these medications         Instructions   ascorbic acid 500 MG tablet  Start taking on: September 5, 2023  Commonly known as: Vitamin C   Take 1 Tablet by mouth every 48 hours for 90 days.  Dose: 500 mg     cephALEXin 500 MG Caps  Commonly known as: Keflex   Take 2 Capsules by mouth 4 times a day for 5 days.  Dose: 1,000 mg     doxycycline monohydrate 100 MG tablet  Commonly known as: Adoxa   Take 1 Tablet by mouth every 12 hours for 5 days.  Dose: 100 mg     ferrous sulfate 325 (65 Fe) MG tablet  Start taking on: September 5, 2023   Take 1 Tablet by mouth every 48 hours for 90 days.  Dose: 325 mg            CONTINUE taking these medications        Instructions   albuterol 108 (90 Base) MCG/ACT Aers inhalation aerosol   Inhale 2 Puffs every 6 hours as needed for Shortness of Breath.  Dose: 2 Puff     amLODIPine 10 MG Tabs  Commonly known as: Norvasc   Take 10 mg by mouth every morning.  Dose: 10 mg     apixaban 5mg Tabs  Commonly known as: Eliquis   Take 1 Tablet by mouth 2 times a day.  Dose: 5 mg     BIOTIN PO   Take 1 Tablet by mouth every morning.  Dose: 1 Tablet     CALCIUM PO   Take 1 Tablet by mouth every morning.  Dose: 1 Tablet     FISH OIL PO   Take 1 Capsule by mouth every morning.  Dose: 1 Capsule     flecainide 50 MG tablet  Commonly known as: Tambocor   Take 2 Tablets by mouth 2 times a day.  Dose: 100 mg     fluticasone 50 MCG/ACT nasal spray  Commonly known as: Flonase   Administer 1 Spray into affected nostril(S) every day.  Dose: 1 Spray     lisinopril 20 MG Tabs  Commonly known as: Prinivil   Take 1 Tablet by mouth every day.  Dose: 20 mg     metoprolol SR 25 MG Tb24  Commonly known as: Toprol XL   Take 1 Tablet by mouth every evening.  Dose: 25 mg     MULTIVITAMIN PO   Take 1 Tablet by mouth every morning.  Dose: 1 Tablet     sertraline 25 MG tablet  Commonly known as: Zoloft   Take 1 Tablet by mouth every day.  Dose: 25 mg     Vitamin B Complex Tabs   Take 1 Tablet by mouth every morning.  Dose: 1 Tablet     VITAMIN  D PO   Take 1 Tablet by mouth every morning.  Dose: 1 Tablet              Allergies  No Known Allergies    DIET  Orders Placed This Encounter   Procedures    Diet Order Diet: Cardiac     Standing Status:   Standing     Number of Occurrences:   1     Order Specific Question:   Diet:     Answer:   Cardiac [6]       ACTIVITY  As tolerated.  Weight bearing as tolerated    CONSULTATIONS  Wound care    PROCEDURES  None    LABORATORY  Lab Results   Component Value Date    SODIUM 138 09/03/2023    POTASSIUM 3.8 09/03/2023    CHLORIDE 104 09/03/2023    CO2 23 09/03/2023    GLUCOSE 112 (H) 09/03/2023    BUN 8 09/03/2023    CREATININE 0.60 09/03/2023        Lab Results   Component Value Date    WBC 7.1 09/03/2023    HEMOGLOBIN 10.8 (L) 09/03/2023    HEMATOCRIT 35.5 (L) 09/03/2023    PLATELETCT 254 09/03/2023        Total time of the discharge process exceeds 36 minutes.  More than 50% of time was spent face to face with patient.  This included but not limited to review of hospital course with patient, treatment goals upon discharge, recommendations to PCP, continued and new medications and their adverse reactions, and nursing instructions for patient.          DX-CHEST-PORTABLE (1 VIEW)   Final Result      Mild cardiomegaly.      DX-TIBIA AND FIBULA RIGHT   Final Result      1.  No evidence of fracture or dislocation.      2.  Diffuse soft tissue swelling. No evidence of soft tissue gas.

## 2023-09-03 NOTE — PROGRESS NOTES
Pt discharged to home via personal vehicle. Discharge paperwork reviewed and signed. Prescribed home medications reviewed with pt per discharge summary. VSS. Pt escorted off unit via wheelchair with hospital staff.

## 2023-09-03 NOTE — PROGRESS NOTES
Bedside report received from night RN. Assumed care of patient. Daily plan of care discussed. Pt awake and alert, sitting up in chair to eat breakfast. Pt aware of and agreeable to plan to discharge home today if cleared by hospitalist. 12 hour chart check complete. Hourly rounding in place.

## 2023-09-03 NOTE — PROGRESS NOTES
Telemetry Shift Summary     Rhythm SB-SR  HR Range 44-67  Ectopy n/a  Measurements 0.20/0.10/0.48           Normal Values  Rhythm SR  HR Range    Measurements 0.12-0.20 / 0.06-0.10  / 0.30-0.52

## 2023-09-03 NOTE — PROGRESS NOTES
Received report from day shift nurse, [place] RN. Assumed pt care at 1915.   Pt is A&Ox4, resting comfortably in bed. Pt on room air; no signs of SOB/respiratory distress. Labs noted, VSS. Pt denies pain at this moment. Needs attended well. Fall precautions in place. Bed at lowest position. Call light and personal belongings within reach.   Plan of care on going, no further concerns as of present.  Hourly rounding in progress.       0340 Telemonitor tech informed this RN of pt's sustaining HR 44.  Pt seen sleeping comfortably on bed; easily awaken.  BP checked :117/58, HR 48. Pt asymptomatic; denies any discomfort, no chest pain.  Informed Dr. Sanchez. MD states to inform him if pt is asymptomatic.     0403 Telemonitor tech informed this RN of pt's sustaining HR 41-43. Charge nurse aware. Vital signs checked and recorded.   Pt denies any discomfort and no chest pain at this time.  Hourly rounding in progress.

## 2023-09-03 NOTE — PROGRESS NOTES
Telemetry Shift Summary     Rhythm: SR/SB  HR: 48-67  Ectopy: no  Measurements: 20/10/48    Normal Values  Rhythm: SR  HR:   Measurements: 0.12-0.20 / 0.04-0.10 / 0.30-0.52    Strip reviewed and placed in chart

## 2023-09-06 LAB
BACTERIA BLD CULT: NORMAL
BACTERIA BLD CULT: NORMAL
SIGNIFICANT IND 70042: NORMAL
SIGNIFICANT IND 70042: NORMAL
SITE SITE: NORMAL
SITE SITE: NORMAL
SOURCE SOURCE: NORMAL
SOURCE SOURCE: NORMAL

## 2023-09-07 ENCOUNTER — TELEPHONE (OUTPATIENT)
Dept: HEALTH INFORMATION MANAGEMENT | Facility: OTHER | Age: 58
End: 2023-09-07

## 2023-09-07 ENCOUNTER — APPOINTMENT (OUTPATIENT)
Dept: MEDICAL GROUP | Facility: MEDICAL CENTER | Age: 58
End: 2023-09-07
Payer: COMMERCIAL

## 2023-09-12 ENCOUNTER — TELEPHONE (OUTPATIENT)
Dept: HEALTH INFORMATION MANAGEMENT | Facility: OTHER | Age: 58
End: 2023-09-12

## 2023-09-14 ENCOUNTER — APPOINTMENT (OUTPATIENT)
Dept: MEDICAL GROUP | Facility: MEDICAL CENTER | Age: 58
End: 2023-09-14

## 2023-09-21 ENCOUNTER — OFFICE VISIT (OUTPATIENT)
Dept: SLEEP MEDICINE | Facility: MEDICAL CENTER | Age: 58
End: 2023-09-21
Attending: NURSE PRACTITIONER
Payer: COMMERCIAL

## 2023-09-21 VITALS
SYSTOLIC BLOOD PRESSURE: 118 MMHG | RESPIRATION RATE: 18 BRPM | DIASTOLIC BLOOD PRESSURE: 84 MMHG | WEIGHT: 293 LBS | OXYGEN SATURATION: 94 % | HEIGHT: 65 IN | HEART RATE: 61 BPM | BODY MASS INDEX: 48.82 KG/M2

## 2023-09-21 DIAGNOSIS — G47.19 EXCESSIVE DAYTIME SLEEPINESS: ICD-10-CM

## 2023-09-21 DIAGNOSIS — G47.30 SLEEP DISORDER BREATHING: Primary | ICD-10-CM

## 2023-09-21 DIAGNOSIS — I48.91 ATRIAL FIBRILLATION WITH RAPID VENTRICULAR RESPONSE (HCC): ICD-10-CM

## 2023-09-21 DIAGNOSIS — E66.01 CLASS 3 SEVERE OBESITY DUE TO EXCESS CALORIES WITH SERIOUS COMORBIDITY AND BODY MASS INDEX (BMI) OF 50.0 TO 59.9 IN ADULT (HCC): ICD-10-CM

## 2023-09-21 PROCEDURE — 99212 OFFICE O/P EST SF 10 MIN: CPT | Performed by: STUDENT IN AN ORGANIZED HEALTH CARE EDUCATION/TRAINING PROGRAM

## 2023-09-21 PROCEDURE — 99204 OFFICE O/P NEW MOD 45 MIN: CPT | Performed by: STUDENT IN AN ORGANIZED HEALTH CARE EDUCATION/TRAINING PROGRAM

## 2023-09-21 PROCEDURE — 3074F SYST BP LT 130 MM HG: CPT | Performed by: STUDENT IN AN ORGANIZED HEALTH CARE EDUCATION/TRAINING PROGRAM

## 2023-09-21 PROCEDURE — 3079F DIAST BP 80-89 MM HG: CPT | Performed by: STUDENT IN AN ORGANIZED HEALTH CARE EDUCATION/TRAINING PROGRAM

## 2023-09-21 ASSESSMENT — FIBROSIS 4 INDEX: FIB4 SCORE: 0.96

## 2023-09-21 NOTE — PROGRESS NOTES
[facility] Consult Note     Date: 9/21/2023 / Time: 8:08 AM      Thank you for requesting a sleep medicine consultation on An Rainey at the sleep center. Presents today with the chief complaints of snoring, excessive daytime sleepiness. She is referred by TITO Dozier  [address] [place] [address],  [place] [address] for evaluation and treatment of sleep disorder breathing .     HISTORY OF PRESENT ILLNESS:     An Rainey is a 58 y.o. female with hypertension, morbid obesity, depression, GERD, lymphedema, atrial fibrillation, and snoring.  Presents to Sleep Clinic for evaluation of sleep.     She was referred to sleep medicine due to concerns regarding her atrial fibrillation and risk of sleep apnea.    She states that she has had difficulty getting a restful night sleep and being tired in the day over the last few years.  In addition she had a long history of nasal congestion due to allergies.  She is using Flonase and Zyrtec.    She generally can get to sleep around 30 minutes.  There are some days will take longer to get to sleep.  She wakes up 1-2 times a night.  When she is awake she often can get back to sleep without issue.  2 days a week it takes him longer than 30 minutes to get to sleep.    She is tired during the day.  She does have brain fog and low energy.  During her sleep she does snore.  She wakes up with a dry mouth.  She does grind her teeth at times.  She does not find her sleep always restorative.  She states she does have dreams where she will be dreaming that she is having difficulty breathing and sometimes wake up gasping.    She sleeps propped up with multiple pillows in her bed or if she sits on the couch sitting up.    As per supplemental questionnaire to be scanned or imported into chart:    [facility] Sleepiness Score: 13    Sleep Schedule  Bedtime: Weekday 10pm  Weekend 12am  Wake time: Weekday 7am Weekend 8am  Sleep-onset latency: 30 min  "  Awakenings from sleep: 1-2  Difficulty falling back asleep: sometimes 2 days a week   Bedroom partner: yes   Naps: No     DAYTIME SYMPTOMS:   Excessive daytime sleepiness: Yes  Daytime fatigue: Yes  Difficulty concentrating: Yes  Memory problems: Yes  Irritability:No   Work/school performance issues: No   Sleepiness with driving: No   Caffeine/stimulant use: Yes  Alcohol use:Yes, How Many? rare     SLEEP RELATED SYMPTOMS  Snoring: Yes  Witnessed apnea or gasping/choking: No   Dry mouth or mouth breathing: Yes  Sweating: Yes hormonal   Teeth grinding/biting: Yes  Morning headaches: No   Refreshed Upon Awakening: No      SLEEP RELATED BEHAVIORS:  Parasomnias (walking, talking, eating, violence): No   Leg kicking: No   Restless legs - \"urge to move\": No   Nightmares: No  Recurrent: No   Dream enactment: No      NARCOLEPSY:  Cataplexy: No   Sleep paralysis: No   Sleep attacks: No   Hypnagogic/hypnopompic hallucinations: No     MEDICAL HISTORY  Past Medical History:   Diagnosis Date    Allergy     Arrhythmia 11/11/2022    Afib    Breath shortness 11/11/2022    With exertion and seasonal allergies.    GERD (gastroesophageal reflux disease)     History of anemia     HTN (hypertension) 04/27/2019    Hypertension     Lymph edema     Bilateral lower extremities and feet.    Seasonal allergies     Snoring         SURGICAL HISTORY  History reviewed. No pertinent surgical history.     FAMILY HISTORY  Family History   Problem Relation Age of Onset    Cancer Mother     Cancer Father     Cancer Sister     Cancer Maternal Grandmother        SOCIAL HISTORY  Social History     Socioeconomic History    Marital status: Other   Tobacco Use    Smoking status: Never    Smokeless tobacco: Never   Vaping Use    Vaping Use: Never used   Substance and Sexual Activity    Alcohol use: Yes     Comment: Rarely    Drug use: No     Social Determinants of Health     Financial Resource Strain: Low Risk  (3/18/2021)    Overall Financial Resource " Strain (CARDIA)     Difficulty of Paying Living Expenses: Not hard at all   Food Insecurity: No Food Insecurity (3/18/2021)    Hunger Vital Sign     Worried About Running Out of Food in the Last Year: Never true     Ran Out of Food in the Last Year: Never true   Transportation Needs: No Transportation Needs (3/18/2021)    PRAPARE - Transportation     Lack of Transportation (Medical): No     Lack of Transportation (Non-Medical): No        Occupation: [employer], 9-530, 5 days a week     CURRENT MEDICATIONS  Current Outpatient Medications   Medication Sig Dispense Refill    ascorbic acid (VITAMIN C) 500 MG tablet Take 1 Tablet by mouth every 48 hours for 90 days. 45 Tablet 1    ferrous sulfate 325 (65 Fe) MG tablet Take 1 Tablet by mouth every 48 hours for 90 days. 45 Tablet 1    amLODIPine (NORVASC) 10 MG Tab Take 10 mg by mouth every morning.      Multiple Vitamin (MULTIVITAMIN PO) Take 1 Tablet by mouth every morning.      Omega-3 Fatty Acids (FISH OIL PO) Take 1 Capsule by mouth every morning.      B Complex Vitamins (VITAMIN B COMPLEX) Tab Take 1 Tablet by mouth every morning.      BIOTIN PO Take 1 Tablet by mouth every morning.      CALCIUM PO Take 1 Tablet by mouth every morning.      apixaban (ELIQUIS) 5mg Tab Take 1 Tablet by mouth 2 times a day. 180 Tablet 3    metoprolol SR (TOPROL XL) 25 MG TABLET SR 24 HR Take 1 Tablet by mouth every evening. 90 Tablet 3    albuterol 108 (90 Base) MCG/ACT Aero Soln inhalation aerosol Inhale 2 Puffs every 6 hours as needed for Shortness of Breath. 8.5 g 2    sertraline (ZOLOFT) 25 MG tablet Take 1 Tablet by mouth every day. 90 Tablet 3    flecainide (TAMBOCOR) 50 MG tablet Take 2 Tablets by mouth 2 times a day. 360 Tablet 2    lisinopril (PRINIVIL) 20 MG Tab Take 1 Tablet by mouth every day. 90 Tablet 0    VITAMIN D PO Take 1 Tablet by mouth every morning.      fluticasone (FLONASE) 50 MCG/ACT nasal spray Administer 1 Spray into affected nostril(S) every day.    "    No current facility-administered medications for this visit.       REVIEW OF SYSTEMS  Constitutional: Denies fevers, Denies weight changes  Ears/Nose/Throat/Mouth: Denies nasal congestion or sore throat   Cardiovascular: Denies chest pain  Respiratory: Chronic shortness of breath, Denies cough  Gastrointestinal/Hepatic: Denies nausea, vomiting  Sleep: see HPI    Physical Examination:  Vitals/ General Appearance:   Weight/BMI: Body mass index is 59.41 kg/m².  /84 (BP Location: Left arm, Patient Position: Sitting, BP Cuff Size: Large adult)   Pulse 61   Resp 18   Ht 1.651 m (5' 5\")   Wt (!) 162 kg (357 lb)   SpO2 94%   Vitals:    09/21/23 0808   BP: 118/84   BP Location: Left arm   Patient Position: Sitting   BP Cuff Size: Large adult   Pulse: 61   Resp: 18   SpO2: 94%   Weight: (!) 162 kg (357 lb)   Height: 1.651 m (5' 5\")       Pt. is alert and oriented to time, place and person. Cooperative and in no apparent distress.     Constitutional: Alert, no distress, well-groomed.  Skin: No rashes in visible areas.  Eye: Round. Conjunctiva clear, lids normal. No icterus.   ENT EXAM  Nasal alae/valves collapsible: No   Nasal septum deviation: Yes  Nasal turbinate hypertrophy: Left: Grade 1   Right: Grade 1  Hard palate narrow: No   Hard palate high: No   Soft palate/uvula (Mallampati score): 4  Tongue Scalloping: No   Retrognathia: No   Micrognathia: No   Cardiovascular:no murmus/gallops/rubs, normal S1 and S2 heart sounds, regular rate and rhythm  Pulmonary:Clear to auscultation, No wheezes, No crackles.  Neurologic:Awake, alert and oriented x 3, Normal age appropriate gait, No involuntary motions.  Extremities: No clubbing, cyanosis, or edema       ASSESSMENT AND PLAN    An Rainey is a 58 y.o. female with hypertension, morbid obesity, depression, GERD, lymphedema, atrial fibrillation, and snoring.  Presents to Sleep Clinic for evaluation of sleep.     1. An Rainey  has symptoms of " Obstructive Sleep Apnea (EVERARDO). An Rainey has symptoms of snoring,  dry mouth, unrefreshed upon awakening, excessive daytime sleepiness, fatigue, brain fog. These can interfere with activities of daily living.   ESS 13  Pt has risk factors for EVERARDO include obesity, thick neck, and crowded oropharynx.     The pathophysiology of EVERARDO and the increased risk of cardiovascular morbidity from untreated EVERARDO is discussed in detail with the patient. She  also has HTN, atrial fibrillation, GERD,  which can be worsened by EVERARDO.      We have discussed diagnostic options including in-laboratory, attended polysomnography and home sleep testing. We have also discussed treatment options including airway pressurization, reconstructive otolaryngologic surgery, dental appliances and weight management.     Subsequently,treatment options will be discussed based on the diagnostic study. Meanwhile, She is urged to avoid supine sleep, weight gain and alcoholic beverages since all of these can worsen EVERARDO. She is cautioned against drowsy driving. If She feels sleepy while driving, advised must pull over for a break/nap, rather than persist on the road, in the interest of Pt's own safety and that of others on the road.    Given her comorbidities patient would benefit from undergoing in lab sleep study.  She prefers to undergo a home sleep study.    Plan  -  She  will be scheduled for an overnight  HST to assess sleep related breathing disorder.  - Follow up 1-2 weeks after sleep study to discuss results and treatment options moving forward   -Advised to reach out via MyChart or by phone with any questions or concerns.     2.  Regarding treatment of other past medical problems and general health maintenance,  Pt is urged to follow up with PCP.      Please note portions of this record was created using voice recognition software. I have made every reasonable attempt to correct obvious errors, but I expect that there are errors of  grammar and possibly content I did not discover before finalizing the note.

## 2023-09-26 ENCOUNTER — OFFICE VISIT (OUTPATIENT)
Dept: MEDICAL GROUP | Facility: MEDICAL CENTER | Age: 58
End: 2023-09-26
Payer: COMMERCIAL

## 2023-09-26 VITALS
BODY MASS INDEX: 48.82 KG/M2 | SYSTOLIC BLOOD PRESSURE: 128 MMHG | HEART RATE: 58 BPM | WEIGHT: 293 LBS | OXYGEN SATURATION: 94 % | HEIGHT: 65 IN | TEMPERATURE: 97.6 F | DIASTOLIC BLOOD PRESSURE: 82 MMHG | RESPIRATION RATE: 20 BRPM

## 2023-09-26 DIAGNOSIS — N95.0 POSTMENOPAUSAL BLEEDING: ICD-10-CM

## 2023-09-26 DIAGNOSIS — N85.8 UTERINE MASS: ICD-10-CM

## 2023-09-26 DIAGNOSIS — L03.115 CELLULITIS OF RIGHT LOWER EXTREMITY: ICD-10-CM

## 2023-09-26 PROCEDURE — 3074F SYST BP LT 130 MM HG: CPT | Performed by: PHYSICIAN ASSISTANT

## 2023-09-26 PROCEDURE — 3079F DIAST BP 80-89 MM HG: CPT | Performed by: PHYSICIAN ASSISTANT

## 2023-09-26 PROCEDURE — 99215 OFFICE O/P EST HI 40 MIN: CPT | Performed by: PHYSICIAN ASSISTANT

## 2023-09-26 RX ORDER — SULFAMETHOXAZOLE AND TRIMETHOPRIM 800; 160 MG/1; MG/1
TABLET ORAL
Qty: 20 TABLET | Refills: 0 | Status: SHIPPED | OUTPATIENT
Start: 2023-09-26 | End: 2023-11-07

## 2023-09-26 ASSESSMENT — FIBROSIS 4 INDEX: FIB4 SCORE: 0.96

## 2023-09-26 NOTE — ASSESSMENT & PLAN NOTE
Chronic and unstable  Concerning for postmenopausal vaginal bleeding.  Has had problems with insurance and had her insurance  and therefore was not able to see the gynecologist.  Did discuss the importance of getting an she is aware of this and I Noemí put in another expedited referral to gynecology to assess uterine mass

## 2023-09-26 NOTE — ASSESSMENT & PLAN NOTE
New and unstable  Patient diagnosed with cellulitis complicated by bilateral lower extremity lymphedema and was seen in the hospital September 2, 2023.  IV antibiotics started.  Sent home on doxycycline.  Approximately 80 improved not on any antibiotics right now and was given referral for wound clinic as well as lymphedema clinic.  Has an appointment pending for lymphedema clinic October 11.  Still pending wound care appointment.    She was on doxy and keflex on a home regimen.  Going to give her Bactrim just to help resolve the rest of the 20% but I do think elevation and lymphedema clinic will be most helpful

## 2023-09-26 NOTE — PROGRESS NOTES
Subjective:   An Rainey is a 58 y.o. female here today for     HPI: Patient is here to discuss:  Problem   Uterine Mass   Cellulitis of Right Lower Extremity    Patient noticed at the end of August redness warmth and oozing at the lower right extremity.  Symptoms were worsening and she went to the hospital.  Was admitted for 2 days for IV antibiotics with significant improvement approximately 80%     Postmenopausal Bleeding    2023: Transvaginal pelvic ultrasound showed endometrial thickening which could be residual blood or hyperplasia versus malignancy.There is a 1.3 x 1.4 cm hypoechoic lesion in the uterine fundus on the left side.  I contacted patient and let her know that she needs to make appoint with gynecology.  I put in a referral for gynecology that has been authorized back in February.    2023: Patient comes in to go over labs.  Has not had any vaginal bleeding since last appointment    2023.  Patient has been having trouble getting through to the gynecologist and is also been told that their schedule is full.  I am putting in an urgent referral.  She has not had bleeding in the last month per se but some tissue type discharge intermittently    23: still bleeding from vagina intermittantly.  She tried to make an appointment but insurance  and needs a new referral               Current medicines (including changes today)  Current Outpatient Medications   Medication Sig Dispense Refill    sulfamethoxazole-trimethoprim (BACTRIM DS) 800-160 MG tablet Take one pill twice a day for ten days 20 Tablet 0    ascorbic acid (VITAMIN C) 500 MG tablet Take 1 Tablet by mouth every 48 hours for 90 days. 45 Tablet 1    ferrous sulfate 325 (65 Fe) MG tablet Take 1 Tablet by mouth every 48 hours for 90 days. 45 Tablet 1    amLODIPine (NORVASC) 10 MG Tab Take 10 mg by mouth every morning.      Multiple Vitamin (MULTIVITAMIN PO) Take 1 Tablet by mouth every morning.       "Omega-3 Fatty Acids (FISH OIL PO) Take 1 Capsule by mouth every morning.      B Complex Vitamins (VITAMIN B COMPLEX) Tab Take 1 Tablet by mouth every morning.      BIOTIN PO Take 1 Tablet by mouth every morning.      CALCIUM PO Take 1 Tablet by mouth every morning.      apixaban (ELIQUIS) 5mg Tab Take 1 Tablet by mouth 2 times a day. 180 Tablet 3    metoprolol SR (TOPROL XL) 25 MG TABLET SR 24 HR Take 1 Tablet by mouth every evening. 90 Tablet 3    albuterol 108 (90 Base) MCG/ACT Aero Soln inhalation aerosol Inhale 2 Puffs every 6 hours as needed for Shortness of Breath. 8.5 g 2    sertraline (ZOLOFT) 25 MG tablet Take 1 Tablet by mouth every day. 90 Tablet 3    flecainide (TAMBOCOR) 50 MG tablet Take 2 Tablets by mouth 2 times a day. 360 Tablet 2    lisinopril (PRINIVIL) 20 MG Tab Take 1 Tablet by mouth every day. 90 Tablet 0    VITAMIN D PO Take 1 Tablet by mouth every morning.      fluticasone (FLONASE) 50 MCG/ACT nasal spray Administer 1 Spray into affected nostril(S) every day.       No current facility-administered medications for this visit.     She  has a past medical history of Allergy, Arrhythmia (11/11/2022), Breath shortness (11/11/2022), GERD (gastroesophageal reflux disease), History of anemia, HTN (hypertension) (04/27/2019), Hypertension, Lymph edema, Seasonal allergies, and Snoring.    She has no past medical history of Asthma, Congestive heart failure (HCC), Liver disease, Stroke (HCC), or Type II or unspecified type diabetes mellitus without mention of complication, not stated as uncontrolled.  Patient has no known allergies.     Social History and Family History were reviewed and updated.    ROS   No headaches, chest pain, no shortness of breath, abdominal pain, nausea, or vomiting.  All other systems were reviewed and are negative or noted as positive in the HPI.       Objective:     /82   Pulse (!) 58   Temp 36.4 °C (97.6 °F) (Temporal)   Resp 20   Ht 1.651 m (5' 5\")   Wt (!) 162 kg " (358 lb)   SpO2 94%  Body mass index is 59.57 kg/m².     Physical Exam:  General: Patient appears well-nourished, well-hydrated, nontoxic  HEENT, normocephalic atraumatic, PERRLA, extraocular movements intact, nares are patent and clear  Neck: No visible masses, thyromegaly or abnormalities noted  Cardiovascular.  Sitting comfortably without visible signs of edema  Lungs: No cyanosis noted, nondyspneic  Skin: Well perfused without evidence of rash or lesions  Neurological: Cranial nerves II through XII intact, normal gait  Musculoskeletal: Normal range of motion, normal strength and no deficit noted   Right lower extremity: Has signs of lymphedema with some clear fluid-filled vesicles and redness.  It goes from about the ankle to distal third of the calf in terms of redness.  She reports that it was up to her knee previously  Normal sensation intact and good range of motion    Clinical Course/Lab Analysis:    Pending and ordered on 2023    Assessment and Plan:   The following treatment plan was discussed.  Signs and symptoms for which to return were discussed with patient at length.  Patient verbalized understanding.    Problem List Items Addressed This Visit       Postmenopausal bleeding     Chronic and unstable  Concerning for postmenopausal vaginal bleeding.  Has had problems with insurance and had her insurance  and therefore was not able to see the gynecologist.  Did discuss the importance of getting an she is aware of this and I Noemí put in another expedited referral to gynecology to assess uterine mass         Relevant Orders    Referral to Gynecology    Cellulitis of right lower extremity     New and unstable  Patient diagnosed with cellulitis complicated by bilateral lower extremity lymphedema and was seen in the hospital 2023.  IV antibiotics started.  Sent home on doxycycline.  Approximately 80 improved not on any antibiotics right now and was given referral for wound clinic as  well as lymphedema clinic.  Has an appointment pending for lymphedema clinic October 11.  Still pending wound care appointment.    She was on doxy and keflex on a home regimen.  Going to give her Bactrim just to help resolve the rest of the 20% but I do think elevation and lymphedema clinic will be most helpful         Relevant Medications    sulfamethoxazole-trimethoprim (BACTRIM DS) 800-160 MG tablet    Uterine mass     Unstable: Expedited referral to gynecology again put in         Relevant Orders    Referral to Gynecology      Discussed the importance of making her gynecology appointment and following up given postmenopausal bleeding and uterine mass.  Concerning for neoplastic process going on.  Also discussed importance of weight loss.  She is having bilateral lower extremity lymphedema with now cellulitis.  She does not wound care clinic follow-up as well as lymphedema clinic scheduled.  I reprinted out labs for her to do that were ordered in June and sent another referral to gynecology in an urgent fashion    We discussed that it cellulitis returns or worsens that she needs to go straight to the ER.  Also discussed that she develops weakness, persistent bleeding or faint feeling she needs to go to the ER as well    My total time spent caring for the patient on the day of the encounter was 41 minutes.   This does not include time spent on separately billable procedures/tests.        Followup: 1 month    Please note that this dictation was created using voice recognition software. I have made every reasonable attempt to correct obvious errors, but I expect that there are errors of grammar and possibly content that I did not discover before finalizing the note.

## 2023-10-11 ENCOUNTER — HOME STUDY (OUTPATIENT)
Dept: SLEEP MEDICINE | Facility: MEDICAL CENTER | Age: 58
End: 2023-10-11
Attending: STUDENT IN AN ORGANIZED HEALTH CARE EDUCATION/TRAINING PROGRAM
Payer: COMMERCIAL

## 2023-10-11 ENCOUNTER — PHYSICAL THERAPY (OUTPATIENT)
Dept: PHYSICAL THERAPY | Facility: REHABILITATION | Age: 58
End: 2023-10-11
Attending: INTERNAL MEDICINE
Payer: COMMERCIAL

## 2023-10-11 DIAGNOSIS — G47.30 SLEEP DISORDER BREATHING: ICD-10-CM

## 2023-10-11 DIAGNOSIS — I89.0 LYMPHEDEMA ASSOCIATED WITH OBESITY: ICD-10-CM

## 2023-10-11 DIAGNOSIS — E66.9 LYMPHEDEMA ASSOCIATED WITH OBESITY: ICD-10-CM

## 2023-10-11 DIAGNOSIS — G47.19 EXCESSIVE DAYTIME SLEEPINESS: ICD-10-CM

## 2023-10-11 DIAGNOSIS — E66.01 CLASS 3 SEVERE OBESITY DUE TO EXCESS CALORIES WITH SERIOUS COMORBIDITY AND BODY MASS INDEX (BMI) OF 50.0 TO 59.9 IN ADULT (HCC): ICD-10-CM

## 2023-10-11 PROCEDURE — 97163 PT EVAL HIGH COMPLEX 45 MIN: CPT

## 2023-10-11 PROCEDURE — 95800 SLP STDY UNATTENDED: CPT | Performed by: STUDENT IN AN ORGANIZED HEALTH CARE EDUCATION/TRAINING PROGRAM

## 2023-10-11 NOTE — OP THERAPY EVALUATION
Outpatient Physical Therapy  LYMPHEDEMA THERAPY INITIAL EVALUATION    [facility] [place] [address] [address]  [address]  [place] [address]  Phone:  [phone]  Fax:  [phone]    Date of Evaluation: 10/11/2023    Patient: An Rainey  YOB: 1965  MRN: [id number]     Referring Provider: Justin Malone M.D.  [address] [facility]   [place] [address]   Referring Diagnosis Cellulitis of left lower extremity [L03.116];Lymphedema [I89.0];Cellulitis of right lower extremity [L03.115]     Time Calculation    Start time: 0734  Stop time: 0818 Time Calculation (min): 44 minutes             Chief Complaint: Leg Swelling    Visit Diagnoses     ICD-10-CM   1. Lymphedema associated with obesity  I89.0    E66.9       Subjective:   History of Present Illness:     Mechanism of injury:  An reports when the integrity of her skin is disrupted, her legs take forever to heal. In addition, her legs tend to leak quite a bit, clear, yellowish fluid. Her R LE has a few open areas but they are better than the L. Feet become swollen too. Her feet/legs have been swollen since 2010. Was only in her R LE for the longst time, but in the past two years, her L LE began swelling too. Overnight, legs reduce, but not to normal. Has compression, but she does not like them. The problem is, she has difficulty donning her compression. When legs are elevated, legs will reduce within a few hours, but never to normal. The past few mornings, her legs have been close to normal for her. An has been wearing tube bandages and an elastic bandage. And at work, she has a foot stool that she uses beneath her desk.   Legs tend to be worse than feet, but there are days when she cannot wear her shoes. But if she does not wear shoes, her feet swell significantly.       Past Medical History:   Diagnosis Date    Allergy     Arrhythmia 11/11/2022    Afib    Breath shortness 11/11/2022    With exertion and seasonal  "allergies.    GERD (gastroesophageal reflux disease)     History of anemia     HTN (hypertension) 04/27/2019    Hypertension     Lymph edema     Bilateral lower extremities and feet.    Seasonal allergies     Snoring      No past surgical history on file.  Social History     Tobacco Use    Smoking status: Never    Smokeless tobacco: Never   Substance Use Topics    Alcohol use: Yes     Comment: Rarely     Family and Occupational History     Socioeconomic History    Marital status: Other     Spouse name: Not on file    Number of children: Not on file    Years of education: Not on file    Highest education level: Not on file   Occupational History    Not on file       Lymphedema Objective    Left Lower Extremity Circumferential Measurements  Waist: cm  Hip: cm  Scrotum: cm  Ground/Upper Thigh: cm  Mid Thigh: cm  Knee: 50.2 cm  Upper Calf: 55.4 cm  Mid Calf: 46.5 cm  Ankle: 34.5 cm  Heel to Foot: 26.8 cm  Total: 213.4 cm    Right Lower Extremity Circumferential Measurements  Waist: cm  Hip: cm  Scrotum: cm  Ground/Upper Thigh: cm  Mid Thigh: cm  Knee: 53.6 cm  Upper Calf: 58.8 cm  Mid Calf: 37.3 cm  Ankle: 33.6 cm  Heel to Foot: 26 cm  Total: 209.3 cm          Therapeutic Treatments and Modalities:     Therapeutic Treatment and Modalities Summary: Educated patient on pathogenesis of lymphedema. Distributed brochure. Patient has also been educated on skin care precautions including hygiene, lotions with pH 5-5.5, and avoidance of lacerations/mosquito bites/heat. Also educated pt on signs/symptoms of cellulitis.     Discussed the need for external compression and educated pt regarding compression garment options.  Patient was educated the optimal use of garment (all day, every day, especially during higher risk activities as discussed, remove at night).  Lymphedema risk factors were discussed with a lymphedema \"Do's and Don'ts\" handout provided.  Patient verbalized understanding to all education today.     Briefly " discussed lipedema, however, given pt's hx of report of body size and weight fluctuations in the past, it does not fully sound as though she has symptoms of lipedema. In addition, her skin texture and lack of hypersensitivity suggests alternative etiology. It does appear more likely that she developed obesity-induced lymphedema. This is can occur in patients with a BMI of greater than 50 causing pressure on the lymphatics and leakage. An's current BMI is 59.6.     Provided education regarding Complete Decongestive Therapy (CDT). CDT is a noninvasive, multi-component approach to treat lymphedema. As there is no cure for lymphedema, the goal of treatment is to return the lymphedema to a stage of latency utilizing healthy lymph vessels and other lymphatic pathways. CDT consists of a combination of manual lymphatic drainage, compression therapy, decongestive exercises, and skin care. This will assist in maintaining the normal or near-normal size of the limb and prevent re-accumulation of lymph fluid. Additional goals of CDT include prevention and elimination of infections and reduction and removal of fibrotic tissues. Handout has been provided with this information.      Time-based treatments/modalities:           Assessment and Plan:   Functional Impairments: swelling    Assessment details:  An is a 57yo F who reports swelling to her B LE that began over ten years ago. She reports her R LE used to be more swollen than L, but over the past few years, her L LE has also swollen. An does demonstrate poor skin quality to her distal LE but reported she did not attend her wound care evaluation. Encouraged her to do so in the future given the appearance of the small wound to anterior aspect of L distal LE. As mentioned above, it is likely An has obesity induced lymphedema and will require compression and instruction on MLD. Lymphedema is characterized by high protein edema in the interstitial tissues causing damage to  the lymph transport system and resulting in decreased transport capacity of the lymphatic system.  Patient has tried elevation, self OTC compression garments, diuresis and a low sodium diet, all of which were unsuccessful at treating her swelling.  Skilled lymphedema treatment is unable to be carried out by the patient and unskilled caregivers. Services will be provided by a certified lymphedema therapist.  Complete decongestive therapy is considered the gold standard of medical practice for lymphedema treatment and has proven to be effective for reduction in limb volume size and decrease risk of complications secondary to swelling (such as wounds and infections).  Today, she reports her legs are better than usual regarding swelling and after a brief discussion, it may be best for her to pursue velcro compression to allow her to sustain her current measurements. Once she has obtained these, she has been invited to return to clinic for an abbreviated course of CDT.     Patient to be seen until decongestion occurs. Physical therapy interventions to include manual lymphatic drainage, compression bandaging, skin care education, wound care if applicable, therapeutic exercises, custom garment fitting and lymphedema education to improve skin integrity, decrease lymphedema swelling, improve joint arthrokinematics and range of motion and improve quality of life.    Spontaneous recovery is not expected without skilled completed decongestive lymphedema therapy.    The patient was informed of evaluation findings and intervention plan and agrees to participate in the plan as outlined.   Prognosis: fair      Goals:   Short Term Goals:  1. Pt will achieve a total limb circumference reduction of 2cm in order to decrease risk for infection and progression of disease process.  2. Pt will be independent with self-MLD to facilitate fluid migration to healthy tissues and lymph nodes.   3. Pt will consistently perform exercises with  compression on to facilitate muscle pump of fluid from affected extremity.     Short term goal time span:  2-4 weeks    Long Term Goals:  1. Patient will perceive the heaviness of the limb to be less as per the Lymphedema Life Impact Scale (LLIS) presented at evaluation.   2. Patient will improve the ability to move the swollen limb such that it feels less limited as per the LLIS presented at evaluation.   3. Pt will have a reduction in total limb circumference of 4cm in order to decrease risk for infection and progression of disease process.   4. Patient will be independent with maintenance phase management of lymphedema including: compression garments, skin care education, risk reduction strategies, manual lymphatic drainage, and therapeutic exercise.     Long term goal time span:  4-6 weeks    Plan:  Therapy options:  Physical therapy treatment to continue  Planned therapy interventions:  Caregiver education, compression stocking, decongestive exercises, home exercise program, intermittent compression, manual lymph drainage, Velcro wraps, strengthening exercises, soft tissue manual techniques (CPT 30937), skin/wound care, short stretch bandages, sequential compression pump, range of motion exercises, postural exercises, patient education, orthotic measurements/fitting and myofascial release techniques  Planned education:  Functional anatomy and physiology of the lymphatic system, pathophysiology of lymphedema, lymphedema exercise, lymphedema precautions, proper skin care/nutrition, compression bandaging, self massage, infection prevention, scar tissue management, activity guidelines, dietary guidelines, skin care guidelines, home pump use, bandage removal and long term self-management of lymphedema  Frequency:  1x week  Duration in weeks:  8  Discussed with:  Patient      Functional Assessment Used    LLIS    Referring provider co-signature:  I have reviewed this plan of care and my co-signature certifies the  need for services.    Certification Period: 10/11/2023 to  12/06/23    Physician Signature: ________________________________ Date: ______________

## 2023-10-12 DIAGNOSIS — L03.116 CELLULITIS OF LEFT LOWER EXTREMITY: ICD-10-CM

## 2023-10-12 DIAGNOSIS — I89.0 LYMPHEDEMA: ICD-10-CM

## 2023-10-12 DIAGNOSIS — L03.115 CELLULITIS OF RIGHT LOWER EXTREMITY: ICD-10-CM

## 2023-10-16 NOTE — PROCEDURES
DIAGNOSTIC HOME SLEEP TEST (HST) REPORT WatchPAT      PATIENT ID:  NAME:  An Rainey  MRN:               [id number]  YOB: 1965  DATE OF STUDY: 10/11/2023      Impression:     This study shows evidence of:      1. Severe obstructive sleep apnea with PAT apnea hypopnea index(pAHI) of 37.7 per hour.  PAT respiratory disturbance index (pRDI) was 38.5 per hour. These findings are based on 7 channels recording of PAT signal with sleep staging, heart rate, pulse oximetry, actigraphy, body position, snoring and respiratory movement.     2. Oxygenation O2 Sat. mean O2 sat was 90%,  andrzej was 77%,  and maximum O2 at 96 %. O2 sat was at or  below 88% for 43.4 min of evaluation time. Oxygen Desaturation (>=4%) Index was 16.6/hr. AVG HR was 50 BPM.      TECHNICAL DESCRIPTION: Patient underwent home sleep apnea testing with peripheral arterial tone signal (WatchPAT™). This is a Type IV portable monitor and device per Medicare. Monitoring was done with 7 channels recording of PAT signal with sleep staging, heart rate, pulse oximetry, actigraphy, body position, snoring and respiratory movement. Prior to using the device, the patient received verbal and written instructions for its application and was provided with the help desk phone number for additional telephonic instruction with 24-hour availability of qualified personnel to answer questions.    Respiratory events:        General sleep summary: . Total recording time is 9 hours and 53 minutes and total Sleep time is 9 hours and 3 minutes. The patient spent 537.1 minutes in the supine position and 6 minutes in the nonsupine position.      Recommendations:    1. CPAP titration study vs Auto CPAP trial .   2.   In general patients with sleep apnea are advised to avoid alcohol and sedatives and to not operate a motor vehicle while drowsy. In some cases alternative treatment options may prove effective in resolving sleep apnea in these options include  upper airway surgery, the use of a dental orthotic or weight loss and positional therapy. Clinical correlation is required.         Louis Hadley MD

## 2023-10-18 ENCOUNTER — TELEPHONE (OUTPATIENT)
Dept: HEALTH INFORMATION MANAGEMENT | Facility: OTHER | Age: 58
End: 2023-10-18
Payer: COMMERCIAL

## 2023-10-18 NOTE — TELEPHONE ENCOUNTER
Left VM for Wound Care scheduling. If patient returns my call, please advise so I may assist with scheduling.

## 2023-10-25 ENCOUNTER — APPOINTMENT (OUTPATIENT)
Dept: PHYSICAL THERAPY | Facility: REHABILITATION | Age: 58
End: 2023-10-25
Attending: INTERNAL MEDICINE
Payer: COMMERCIAL

## 2023-10-30 ENCOUNTER — APPOINTMENT (OUTPATIENT)
Dept: PHYSICAL THERAPY | Facility: REHABILITATION | Age: 58
End: 2023-10-30
Attending: INTERNAL MEDICINE
Payer: COMMERCIAL

## 2023-11-01 ENCOUNTER — PHYSICAL THERAPY (OUTPATIENT)
Dept: PHYSICAL THERAPY | Facility: REHABILITATION | Age: 58
End: 2023-11-01
Attending: INTERNAL MEDICINE
Payer: COMMERCIAL

## 2023-11-01 DIAGNOSIS — I89.0 LYMPHEDEMA ASSOCIATED WITH OBESITY: ICD-10-CM

## 2023-11-01 DIAGNOSIS — E66.9 LYMPHEDEMA ASSOCIATED WITH OBESITY: ICD-10-CM

## 2023-11-01 PROCEDURE — 97535 SELF CARE MNGMENT TRAINING: CPT

## 2023-11-01 NOTE — OP THERAPY DISCHARGE SUMMARY
Outpatient Physical Therapy  DISCHARGE SUMMARY NOTE      [facility] [place] [address] [address]  [address]  [facility] [address]  Phone:  [phone]  Fax:  [phone]    Date of Visit: 11/01/2023    Patient: An Rainey  YOB: 1965  MRN: [id number]     Referring Provider: Justin Malone M.D.  [address] [facility] [place] [address]   Referring Diagnosis Cellulitis of left lower limb [L03.116];Lymphedema, not elsewhere classified [I89.0];Cellulitis of right lower limb [L03.115]             Your patient is being discharged from Physical Therapy with the following comments:   Goals met    Comments:  An has reduced her L LE by 13cm and her R LE by 9cm. She is managing her B LE well with use of Velcro compression. She may benefit from an ankle/foot piece. At this time, she does not require further intervention and can be DC'd to her home program.     Thank you for the referral,    Becka Ruano, PT, DPT, CLT    Date: 11/1/2023

## 2023-11-01 NOTE — OP THERAPY DAILY TREATMENT
Outpatient Physical Therapy  LYMPHEDEMA THERAPY DAILY TREATMENT     [facility] [address].  [address]  [place] [address]  Phone:  [phone]  Fax:  [phone]    Date: 11/01/2023    Patient: An Rainey  YOB: 1965  MRN: [id number]     Time Calculation    Start time: 0743 (pt late)  Stop time: 0810 Time Calculation (min): 27 minutes         Chief Complaint: Lymphedema Aquired    Visit #: 2    Subjective:   History of Present Illness:     Mechanism of injury:  Received velcro; loves it.       Lymphedema Objective    Left Lower Extremity Circumferential Measurements  Waist: cm  Hip: cm  Scrotum: cm  Ground/Upper Thigh: cm  Mid Thigh: cm  Knee: 50.9 cm  Upper Calf: 53.3 cm  Mid Calf: 38.1 cm  Ankle: 31.6 cm  Heel to Foot: 26.7 cm  Total: 200.6 cm    Right Lower Extremity Circumferential Measurements  Waist: cm  Hip: cm  Scrotum: cm  Ground/Upper Thigh: cm  Mid Thigh: cm  Knee: 51.4 cm  Upper Calf: 55.1 cm  Mid Calf: 37.3 cm  Ankle: 31.4 cm  Heel to Foot: 25.7 cm  Total: 200.9 cm           Left Lower Extremity Circumferential Measurements 10/11  Waist: cm  Hip: cm  Scrotum: cm  Ground/Upper Thigh: cm  Mid Thigh: cm  Knee: 50.2 cm  Upper Calf: 55.4 cm  Mid Calf: 46.5 cm  Ankle: 34.5 cm  Heel to Foot: 26.8 cm  Total: 213.4 cm     Right Lower Extremity Circumferential Measurements 10/11  Waist: cm  Hip: cm  Scrotum: cm  Ground/Upper Thigh: cm  Mid Thigh: cm  Knee: 53.6 cm  Upper Calf: 58.8 cm  Mid Calf: 37.3 cm  Ankle: 33.6 cm  Heel to Foot: 26 cm  Total: 209.3 cm      Therapeutic Treatments and Modalities:     1. Self Care ADL Training (CPT 71165)    Therapeutic Treatment and Modalities Summary: -discussed use of current Velcro; pt reporting she does not like the compressive liner. Wears tubular bandage beneath instead  -discussed possibility of getting ankle/foot Velcro piece; provided brand/sizing information      Time-based treatments/modalities:    Physical  Therapy Timed Treatment Charges  Functional training, self care minutes (CPT 67434): 27 minutes      Assessment and Plan:   Assessment details:  An has reduced her L LE by 13cm and her R LE by 9cm. She is managing her B LE well with use of Velcro compression. She may benefit from an ankle/foot piece. At this time, she does not require further intervention and can be DC'd to her home program.      Plan:  Therapy options:  No further treatment needed  Discussed with:  Patient

## 2023-11-02 ENCOUNTER — HOSPITAL ENCOUNTER (OUTPATIENT)
Facility: MEDICAL CENTER | Age: 58
End: 2023-11-02
Attending: OBSTETRICS & GYNECOLOGY
Payer: COMMERCIAL

## 2023-11-02 ENCOUNTER — GYNECOLOGY VISIT (OUTPATIENT)
Dept: OBGYN | Facility: CLINIC | Age: 58
End: 2023-11-02
Payer: COMMERCIAL

## 2023-11-02 ENCOUNTER — HOSPITAL ENCOUNTER (OUTPATIENT)
Dept: LAB | Facility: MEDICAL CENTER | Age: 58
End: 2023-11-02
Attending: PHYSICIAN ASSISTANT
Payer: COMMERCIAL

## 2023-11-02 VITALS — WEIGHT: 293 LBS | SYSTOLIC BLOOD PRESSURE: 151 MMHG | DIASTOLIC BLOOD PRESSURE: 83 MMHG | BODY MASS INDEX: 58.91 KG/M2

## 2023-11-02 DIAGNOSIS — F41.9 ANXIETY: ICD-10-CM

## 2023-11-02 DIAGNOSIS — I10 PRIMARY HYPERTENSION: ICD-10-CM

## 2023-11-02 DIAGNOSIS — N95.0 POST-MENOPAUSAL BLEEDING: Primary | ICD-10-CM

## 2023-11-02 DIAGNOSIS — R73.09 ELEVATED GLUCOSE: ICD-10-CM

## 2023-11-02 DIAGNOSIS — N95.0 POSTMENOPAUSAL BLEEDING: ICD-10-CM

## 2023-11-02 DIAGNOSIS — N85.8 UTERINE MASS: ICD-10-CM

## 2023-11-02 DIAGNOSIS — N84.1 ENDOCERVICAL POLYP: ICD-10-CM

## 2023-11-02 LAB
ALBUMIN SERPL BCP-MCNC: 3.9 G/DL (ref 3.2–4.9)
ALBUMIN/GLOB SERPL: 1.2 G/DL
ALP SERPL-CCNC: 84 U/L (ref 30–99)
ALT SERPL-CCNC: 17 U/L (ref 2–50)
ANION GAP SERPL CALC-SCNC: 11 MMOL/L (ref 7–16)
AST SERPL-CCNC: 20 U/L (ref 12–45)
BASOPHILS # BLD AUTO: 0.7 % (ref 0–1.8)
BASOPHILS # BLD: 0.05 K/UL (ref 0–0.12)
BILIRUB SERPL-MCNC: 0.3 MG/DL (ref 0.1–1.5)
BUN SERPL-MCNC: 11 MG/DL (ref 8–22)
CALCIUM ALBUM COR SERPL-MCNC: 8.9 MG/DL (ref 8.5–10.5)
CALCIUM SERPL-MCNC: 8.8 MG/DL (ref 8.4–10.2)
CHLORIDE SERPL-SCNC: 105 MMOL/L (ref 96–112)
CO2 SERPL-SCNC: 26 MMOL/L (ref 20–33)
CREAT SERPL-MCNC: 0.72 MG/DL (ref 0.5–1.4)
EOSINOPHIL # BLD AUTO: 0.26 K/UL (ref 0–0.51)
EOSINOPHIL NFR BLD: 3.8 % (ref 0–6.9)
ERYTHROCYTE [DISTWIDTH] IN BLOOD BY AUTOMATED COUNT: 54.4 FL (ref 35.9–50)
EST. AVERAGE GLUCOSE BLD GHB EST-MCNC: 114 MG/DL
FASTING STATUS PATIENT QL REPORTED: NORMAL
GFR SERPLBLD CREATININE-BSD FMLA CKD-EPI: 98 ML/MIN/1.73 M 2
GLOBULIN SER CALC-MCNC: 3.3 G/DL (ref 1.9–3.5)
GLUCOSE SERPL-MCNC: 98 MG/DL (ref 65–99)
HBA1C MFR BLD: 5.6 % (ref 4–5.6)
HCT VFR BLD AUTO: 41.1 % (ref 37–47)
HGB BLD-MCNC: 12.6 G/DL (ref 12–16)
IMM GRANULOCYTES # BLD AUTO: 0.02 K/UL (ref 0–0.11)
IMM GRANULOCYTES NFR BLD AUTO: 0.3 % (ref 0–0.9)
LYMPHOCYTES # BLD AUTO: 1.88 K/UL (ref 1–4.8)
LYMPHOCYTES NFR BLD: 27.7 % (ref 22–41)
MCH RBC QN AUTO: 26.6 PG (ref 27–33)
MCHC RBC AUTO-ENTMCNC: 30.7 G/DL (ref 32.2–35.5)
MCV RBC AUTO: 86.7 FL (ref 81.4–97.8)
MONOCYTES # BLD AUTO: 0.49 K/UL (ref 0–0.85)
MONOCYTES NFR BLD AUTO: 7.2 % (ref 0–13.4)
NEUTROPHILS # BLD AUTO: 4.08 K/UL (ref 1.82–7.42)
NEUTROPHILS NFR BLD: 60.3 % (ref 44–72)
NRBC # BLD AUTO: 0 K/UL
NRBC BLD-RTO: 0 /100 WBC (ref 0–0.2)
PATHOLOGY CONSULT NOTE: NORMAL
PLATELET # BLD AUTO: 274 K/UL (ref 164–446)
PMV BLD AUTO: 10.3 FL (ref 9–12.9)
POTASSIUM SERPL-SCNC: 4.8 MMOL/L (ref 3.6–5.5)
PROT SERPL-MCNC: 7.2 G/DL (ref 6–8.2)
RBC # BLD AUTO: 4.74 M/UL (ref 4.2–5.4)
SODIUM SERPL-SCNC: 142 MMOL/L (ref 135–145)
TSH SERPL DL<=0.005 MIU/L-ACNC: 2.33 UIU/ML (ref 0.38–5.33)
WBC # BLD AUTO: 6.8 K/UL (ref 4.8–10.8)

## 2023-11-02 PROCEDURE — 3077F SYST BP >= 140 MM HG: CPT | Performed by: OBSTETRICS & GYNECOLOGY

## 2023-11-02 PROCEDURE — 36415 COLL VENOUS BLD VENIPUNCTURE: CPT

## 2023-11-02 PROCEDURE — 3079F DIAST BP 80-89 MM HG: CPT | Performed by: OBSTETRICS & GYNECOLOGY

## 2023-11-02 PROCEDURE — 85025 COMPLETE CBC W/AUTO DIFF WBC: CPT

## 2023-11-02 PROCEDURE — 58100 BIOPSY OF UTERUS LINING: CPT | Performed by: OBSTETRICS & GYNECOLOGY

## 2023-11-02 PROCEDURE — 88305 TISSUE EXAM BY PATHOLOGIST: CPT

## 2023-11-02 PROCEDURE — 99203 OFFICE O/P NEW LOW 30 MIN: CPT | Mod: 25 | Performed by: OBSTETRICS & GYNECOLOGY

## 2023-11-02 PROCEDURE — 84443 ASSAY THYROID STIM HORMONE: CPT

## 2023-11-02 PROCEDURE — 80053 COMPREHEN METABOLIC PANEL: CPT

## 2023-11-02 PROCEDURE — 83036 HEMOGLOBIN GLYCOSYLATED A1C: CPT

## 2023-11-02 ASSESSMENT — FIBROSIS 4 INDEX: FIB4 SCORE: 0.99

## 2023-11-02 NOTE — PROGRESS NOTES
New GYN Problem Note    CC:   Gynecologic Exam (Postmenopausal bleeding, uterine mass)      HPI:   Patient is a 58 y.o.  who presents complaining of on and off (frequent) vaginal bleeding for which she told PCP about in March and had an US showing a small fundal fibroid and endometrial stripe >1cm.  She reports for a variety of reasons she has been unable to make appt until now. She reports bleeding is variable and lasts for days or weeks at a time at times. She is not currently bleeding but just stopped.  Flow can vary from spotting to menstrual like and she does even pass some small clots at time. Denies significant pain with bleeding, maybe occasional mild cramps. She reports she had gone >1yr without bleeding so was considered menopausal prior to this (and of course is 58 years old).      She reports PMH of HTN, Afib, chronic lymphedema and morbid obesity.        GYNECOLOGIC HISTORY:   Menstrual History  Patient's last menstrual period was Patient's last menstrual period was 2019 (approximate).       Pap History  Last Pap: 2021 - wnl  Hx Moderate or Severe Dysplasia : No     Sexually active:    Social History     Substance and Sexual Activity   Sexual Activity Not on file       OBSTETRIC HISTORY:  OB History    Para Term  AB Living   2 2 2     2   SAB IAB Ectopic Molar Multiple Live Births             2      # Outcome Date GA Lbr Kalyan/2nd Weight Sex Delivery Anes PTL Lv   2 Term  40w0d   M Vag-Spont  N RICHAR   1 Term  40w0d   F Vag-Spont  N RICHAR       MEDICAL HISTORY:  Past Medical History:   Diagnosis Date    Allergy     Arrhythmia 2022    Afib    Breath shortness 2022    With exertion and seasonal allergies.    GERD (gastroesophageal reflux disease)     History of anemia     HTN (hypertension) 2019    Hypertension     Lymph edema     Bilateral lower extremities and feet.    Seasonal allergies     Snoring        SURGICAL HISTORY:  History reviewed. No pertinent  surgical history.    FAMILY HISTORY:  Family History   Problem Relation Age of Onset    Cancer Mother     Cancer Father     Cancer Sister     Cancer Maternal Grandmother        ALLERGIES / REACTIONS:  No Known Allergies    SOCIAL HISTORY:   reports that she has never smoked. She has never used smokeless tobacco. She reports current alcohol use. She reports that she does not use drugs.    ROS:   Positive ROS: none  Gen: no fevers or chills, no significant weight loss or gain, excessive fatigue  Respiratory:  no cough or dyspnea  Cardiac:  no chest pain, no palpitations, no syncope  Breast: no breast discharge, pain, lump or skin changes  GI:  no heartburn, no abdominal pain, no nausea or vomiting  Urinary: no dysuria, urgency, frequency, incontinence   Psych: no depression or anxiety  Neuro: no migraines with aura, fainting spells, numbness or tingling  Extremities: no joint pain, persistently swollen ankles, recurrent leg cramps       PHYSICAL EXAMINATION:  Vital Signs:   Vitals:    11/02/23 1540   BP: (!) 151/83   BP Location: Right arm   Patient Position: Sitting   BP Cuff Size: Adult   Weight: (!) 354 lb     Body mass index is 58.91 kg/m².  Constitutional: The patient is well developed and well nourished.  Psychiatric: Patient is oriented to time place and person.   Skin: No rash observed.  Neck: Neck appears symmetric.  Respiratory: normal effort  Abdomen: Soft, non-tender.  Pelvic Exam:     External female genitalia: normal appearance    Urethra - no lesions, no erythema    Vagina: moist, pink, normal ruggae    Cervix: pink, smooth; large endocervical polyp (about 1-1.5cm in size) extending from os; removal was attempted with ring forceps but despite traction, polyp would not detach; EMB pipelle was able to be passed past the polyp and into cavity without difficulty (see procedure note)    EMB obtained    Extremeties: Legs are symmetric and without tenderness. There is no edema present.    ASSESSMENT AND  PLAN:  58 y.o.       1. Post-menopausal bleeding   - thickened stripe and persistent PMB.  EMB obtained today.  Polyp may also be source of bleeding   - will need to remove polyp in OR with myosure device as it is likely on a thick stalk.  Will schedule for hysteroscopy with D&C for additional endometrial evaluation and polypectomy   - if EMB sample shows malignancy, pt would instead be referred to gynonc    - Pathology Specimen    2. Endocervical polyp   - will need removal in OR        Leola Cole D.O.

## 2023-11-02 NOTE — PROCEDURES
Endometrial Biopsy Procedure Note    Indication: PMB  The speculum was placed.  Beta-dine was applied to the cervix.  The uterus was not sounded.  Large endocervical polyp noted at/protruding from os  The biopsy device was placed through the uterus past the polyp with ease and without the aid of a tenaculum/Allis clamp.  Suction was applied and the sample removed and sent to pathology.  There were no complications.    Leola Cole D.O.

## 2023-11-02 NOTE — PROGRESS NOTES
Patient here for GYN exam.  Postmenopausal bleeding, uterine mass  LMP=  BCM:  Last pap:  Last mammogram if applies:  Phone number:  Pharmacy verified

## 2023-11-06 ENCOUNTER — APPOINTMENT (OUTPATIENT)
Dept: PHYSICAL THERAPY | Facility: REHABILITATION | Age: 58
End: 2023-11-06
Attending: INTERNAL MEDICINE
Payer: COMMERCIAL

## 2023-11-07 ENCOUNTER — OFFICE VISIT (OUTPATIENT)
Dept: MEDICAL GROUP | Facility: MEDICAL CENTER | Age: 58
End: 2023-11-07
Payer: COMMERCIAL

## 2023-11-07 VITALS
TEMPERATURE: 98 F | RESPIRATION RATE: 20 BRPM | DIASTOLIC BLOOD PRESSURE: 82 MMHG | BODY MASS INDEX: 48.82 KG/M2 | HEIGHT: 65 IN | SYSTOLIC BLOOD PRESSURE: 130 MMHG | WEIGHT: 293 LBS | OXYGEN SATURATION: 93 % | HEART RATE: 65 BPM

## 2023-11-07 DIAGNOSIS — E66.01 MORBID OBESITY WITH BMI OF 50.0-59.9, ADULT (HCC): ICD-10-CM

## 2023-11-07 DIAGNOSIS — I89.0 LYMPHEDEMA: ICD-10-CM

## 2023-11-07 DIAGNOSIS — E66.01 CLASS 3 SEVERE OBESITY DUE TO EXCESS CALORIES WITH SERIOUS COMORBIDITY AND BODY MASS INDEX (BMI) OF 50.0 TO 59.9 IN ADULT (HCC): ICD-10-CM

## 2023-11-07 DIAGNOSIS — G47.33 OSA (OBSTRUCTIVE SLEEP APNEA): ICD-10-CM

## 2023-11-07 DIAGNOSIS — I48.11 LONGSTANDING PERSISTENT ATRIAL FIBRILLATION (HCC): ICD-10-CM

## 2023-11-07 DIAGNOSIS — N84.0 ENDOMETRIAL POLYP: ICD-10-CM

## 2023-11-07 PROBLEM — E66.09 OBESITY DUE TO EXCESS CALORIES WITH SERIOUS COMORBIDITY: Status: ACTIVE | Noted: 2023-11-07

## 2023-11-07 PROCEDURE — 3079F DIAST BP 80-89 MM HG: CPT | Performed by: PHYSICIAN ASSISTANT

## 2023-11-07 PROCEDURE — 99214 OFFICE O/P EST MOD 30 MIN: CPT | Performed by: PHYSICIAN ASSISTANT

## 2023-11-07 PROCEDURE — 3075F SYST BP GE 130 - 139MM HG: CPT | Performed by: PHYSICIAN ASSISTANT

## 2023-11-07 RX ORDER — SEMAGLUTIDE 0.25 MG/.5ML
0.25 INJECTION, SOLUTION SUBCUTANEOUS
Qty: 0.5 ML | Refills: 2 | Status: SHIPPED | OUTPATIENT
Start: 2023-11-07

## 2023-11-07 ASSESSMENT — FIBROSIS 4 INDEX: FIB4 SCORE: 0.99

## 2023-11-07 NOTE — ASSESSMENT & PLAN NOTE
"Chronic condition that is unstable  11/2/2023 \"patient saw OB/GYN and had endometrial biopsy done with sample taken that showed benign endometrial polyp. Will follow up on 11/22/23  "

## 2023-11-07 NOTE — ASSESSMENT & PLAN NOTE
Chronic and unstable  11/7/2023: Saw lymphedema clinic twice.  Has area wrapped with compression wrappings.  Has had no weeping or oozing or return of cellulitis.  Patient popped the wraps/compression bandages and they were approximately $200 but have worked.

## 2023-11-07 NOTE — PROGRESS NOTES
"Subjective:   An Rainey is a 58 y.o. female here today for     HPI: Patient is here to discuss:  Problem   Obesity Due to Excess Calories With Serious Comorbidity   Morbid Obesity With Bmi of 50.0-59.9, Adult (Hcc)   Jonh (Obstructive Sleep Apnea)   Endometrial Polyp    11/2/2023 \"patient saw OB/GYN and had endometrial biopsy done with sample taken that showed benign endometrial polyp     Af (Atrial Fibrillation) (Hcc)   Lymphedema    11/7/2023: Saw lymphedema clinic twice.  Has area wrapped with compression wrappings.  Has had no weeping or oozing or return of cellulitis            Current medicines (including changes today)  Current Outpatient Medications   Medication Sig Dispense Refill    Semaglutide (WEGOVY) 0.25 MG/0.5ML Solution Auto-injector Pen-injector Inject 0.5 mL under the skin every 7 days. 0.5 mL 2    lisinopril (PRINIVIL) 20 MG Tab TAKE 1 TABLET BY MOUTH EVERY DAY 90 Tablet 3    ascorbic acid (VITAMIN C) 500 MG tablet Take 1 Tablet by mouth every 48 hours for 90 days. 45 Tablet 1    ferrous sulfate 325 (65 Fe) MG tablet Take 1 Tablet by mouth every 48 hours for 90 days. 45 Tablet 1    amLODIPine (NORVASC) 10 MG Tab Take 10 mg by mouth every morning.      Multiple Vitamin (MULTIVITAMIN PO) Take 1 Tablet by mouth every morning.      Omega-3 Fatty Acids (FISH OIL PO) Take 1 Capsule by mouth every morning.      B Complex Vitamins (VITAMIN B COMPLEX) Tab Take 1 Tablet by mouth every morning.      BIOTIN PO Take 1 Tablet by mouth every morning.      CALCIUM PO Take 1 Tablet by mouth every morning.      apixaban (ELIQUIS) 5mg Tab Take 1 Tablet by mouth 2 times a day. 180 Tablet 3    metoprolol SR (TOPROL XL) 25 MG TABLET SR 24 HR Take 1 Tablet by mouth every evening. 90 Tablet 3    albuterol 108 (90 Base) MCG/ACT Aero Soln inhalation aerosol Inhale 2 Puffs every 6 hours as needed for Shortness of Breath. 8.5 g 2    sertraline (ZOLOFT) 25 MG tablet Take 1 Tablet by mouth every day. 90 Tablet 3    " "flecainide (TAMBOCOR) 50 MG tablet Take 2 Tablets by mouth 2 times a day. 360 Tablet 2    VITAMIN D PO Take 1 Tablet by mouth every morning.      fluticasone (FLONASE) 50 MCG/ACT nasal spray Administer 1 Spray into affected nostril(S) every day.       No current facility-administered medications for this visit.     She  has a past medical history of Allergy, Arrhythmia (11/11/2022), Breath shortness (11/11/2022), GERD (gastroesophageal reflux disease), History of anemia, HTN (hypertension) (04/27/2019), Hypertension, Lymph edema, Seasonal allergies, and Snoring.    She has no past medical history of Asthma, Congestive heart failure (HCC), Liver disease, Stroke (HCC), or Type II or unspecified type diabetes mellitus without mention of complication, not stated as uncontrolled.  Patient has no known allergies.     Social History and Family History were reviewed and updated.    ROS   No headaches, chest pain, no shortness of breath, abdominal pain, nausea, or vomiting.  All other systems were reviewed and are negative or noted as positive in the HPI.       Objective:     /82   Pulse 65   Temp 36.7 °C (98 °F) (Temporal)   Resp 20   Ht 1.651 m (5' 5\")   Wt (!) 161 kg (354 lb)   SpO2 93%  Body mass index is 58.91 kg/m².     Physical Exam:  General: Patient appears well-nourished, well-hydrated, nontoxic  HEENT, normocephalic atraumatic, PERRLA, extraocular movements intact, nares are patent and clear  Neck: No visible masses, thyromegaly or abnormalities noted  Cardiovascular.  Sitting comfortably without visible signs of edema  Lungs: No cyanosis noted, nondyspneic  Skin: Well perfused without evidence of rash or lesions  Neurological: Cranial nerves II through XII intact, normal gait  Musculoskeletal: Normal range of motion, normal strength and no deficit noted     Clinical Course/Lab Analysis:     Latest Reference Range & Units 11/02/23 08:14   WBC 4.8 - 10.8 K/uL 6.8   RBC 4.20 - 5.40 M/uL 4.74   Hemoglobin " 12.0 - 16.0 g/dL 12.6   Hematocrit 37.0 - 47.0 % 41.1   MCV 81.4 - 97.8 fL 86.7   MCH 27.0 - 33.0 pg 26.6 (L)   MCHC 32.2 - 35.5 g/dL 30.7 (L)   RDW 35.9 - 50.0 fL 54.4 (H)   Platelet Count 164 - 446 K/uL 274   MPV 9.0 - 12.9 fL 10.3   Neutrophils-Polys 44.00 - 72.00 % 60.30   Neutrophils (Absolute) 1.82 - 7.42 K/uL 4.08   Lymphocytes 22.00 - 41.00 % 27.70   Lymphs (Absolute) 1.00 - 4.80 K/uL 1.88   Monocytes 0.00 - 13.40 % 7.20   Monos (Absolute) 0.00 - 0.85 K/uL 0.49   Eosinophils 0.00 - 6.90 % 3.80   Eos (Absolute) 0.00 - 0.51 K/uL 0.26   Basophils 0.00 - 1.80 % 0.70   Baso (Absolute) 0.00 - 0.12 K/uL 0.05   Immature Granulocytes 0.00 - 0.90 % 0.30   Immature Granulocytes (abs) 0.00 - 0.11 K/uL 0.02   Nucleated RBC 0.00 - 0.20 /100 WBC 0.00   NRBC (Absolute) K/uL 0.00   Sodium 135 - 145 mmol/L 142   Potassium 3.6 - 5.5 mmol/L 4.8   Chloride 96 - 112 mmol/L 105   Co2 20 - 33 mmol/L 26   Anion Gap 7.0 - 16.0  11.0   Glucose 65 - 99 mg/dL 98   Bun 8 - 22 mg/dL 11   Creatinine 0.50 - 1.40 mg/dL 0.72   GFR (CKD-EPI) >60 mL/min/1.73 m 2 98   Calcium 8.4 - 10.2 mg/dL 8.8   Correct Calcium 8.5 - 10.5 mg/dL 8.9   AST(SGOT) 12 - 45 U/L 20   ALT(SGPT) 2 - 50 U/L 17   Alkaline Phosphatase 30 - 99 U/L 84   Total Bilirubin 0.1 - 1.5 mg/dL 0.3   Albumin 3.2 - 4.9 g/dL 3.9   Total Protein 6.0 - 8.2 g/dL 7.2   Globulin 1.9 - 3.5 g/dL 3.3   A-G Ratio g/dL 1.2   Glycohemoglobin 4.0 - 5.6 % 5.6   Estim. Avg Glu mg/dL 114   Fasting Status  Fasting   TSH 0.380 - 5.330 uIU/mL 2.330   (L): Data is abnormally low  (H): Data is abnormally high    Assessment and Plan:   The following treatment plan was discussed.  Signs and symptoms for which to return were discussed with patient at length.  Patient verbalized understanding.    Problem List Items Addressed This Visit       Lymphedema     Chronic and unstable  11/7/2023: Saw lymphedema clinic twice.  Has area wrapped with compression wrappings.  Has had no weeping or oozing or return of  "cellulitis.  Patient popped the wraps/compression bandages and they were approximately $200 but have worked.         AF (atrial fibrillation) (HCC)     Chronic and stable  She follows regularly and has appointment coming on December 8.  Continue amlodipine 10 mg daily, Eliquis 5 mg twice daily, lisinopril 20 mg daily and flecainide 50 mg 2 tablets by mouth twice daily.  Hold metoprolol for now for episodes of low heart rate less than 60         Endometrial polyp     Chronic condition that is unstable  11/2/2023 \"patient saw OB/GYN and had endometrial biopsy done with sample taken that showed benign endometrial polyp. Will follow up on 11/22/23         Obesity due to excess calories with serious comorbidity    Relevant Medications    Semaglutide (WEGOVY) 0.25 MG/0.5ML Solution Auto-injector Pen-injector    Other Relevant Orders    Patient identified as having weight management issue.  Appropriate orders and counseling given.    Referral to Nutrition Services    Referral to Bariatric Surgery    Morbid obesity with BMI of 50.0-59.9, adult (HCC)    Relevant Medications    Semaglutide (WEGOVY) 0.25 MG/0.5ML Solution Auto-injector Pen-injector    Other Relevant Orders    Patient identified as having weight management issue.  Appropriate orders and counseling given.    Referral to Nutrition Services    Referral to Bariatric Surgery    EVERARDO (obstructive sleep apnea)     Documented EVERARDO. Defers on sleep pap. Sleeps upright. Recommend cpap.               Followup: 6 months    Please note that this dictation was created using voice recognition software. I have made every reasonable attempt to correct obvious errors, but I expect that there are errors of grammar and possibly content that I did not discover before finalizing the note.             "

## 2023-11-07 NOTE — ASSESSMENT & PLAN NOTE
Chronic and stable  She follows regularly and has appointment coming on December 8.  Continue amlodipine 10 mg daily, Eliquis 5 mg twice daily, lisinopril 20 mg daily and flecainide 50 mg 2 tablets by mouth twice daily.  Hold metoprolol for now for episodes of low heart rate less than 60

## 2023-11-08 ENCOUNTER — APPOINTMENT (OUTPATIENT)
Dept: PHYSICAL THERAPY | Facility: REHABILITATION | Age: 58
End: 2023-11-08
Attending: INTERNAL MEDICINE
Payer: COMMERCIAL

## 2023-12-04 ENCOUNTER — APPOINTMENT (OUTPATIENT)
Dept: ADMISSIONS | Facility: MEDICAL CENTER | Age: 58
End: 2023-12-04
Attending: OBSTETRICS & GYNECOLOGY
Payer: COMMERCIAL

## 2023-12-05 ENCOUNTER — APPOINTMENT (OUTPATIENT)
Dept: CARDIOLOGY | Facility: MEDICAL CENTER | Age: 58
End: 2023-12-05
Payer: COMMERCIAL

## 2023-12-05 DIAGNOSIS — J45.20 MILD INTERMITTENT ASTHMA, UNSPECIFIED WHETHER COMPLICATED: ICD-10-CM

## 2023-12-06 NOTE — TELEPHONE ENCOUNTER
Received request via: Pharmacy    Was the patient seen in the last year in this department? Yes    Does the patient have an active prescription (recently filled or refills available) for medication(s) requested? yes    Does the patient have nursing home Plus and need 100 day supply (blood pressure, diabetes and cholesterol meds only)? Medication is not for cholesterol, blood pressure or diabetes   Requested Prescriptions     Pending Prescriptions Disp Refills    albuterol 108 (90 Base) MCG/ACT Aero Soln inhalation aerosol [Pharmacy Med Name: ALBUTEROL HFA (PROAIR) INHALER] 8.5 Each 2     Sig: INHALE 2 PUFFS BY MOUTH EVERY 6 HOURS AS NEEDED FOR SHORTNESS OF BREATH

## 2023-12-07 ENCOUNTER — GYNECOLOGY VISIT (OUTPATIENT)
Dept: OBGYN | Facility: CLINIC | Age: 58
End: 2023-12-07
Payer: COMMERCIAL

## 2023-12-07 ENCOUNTER — PRE-ADMISSION TESTING (OUTPATIENT)
Dept: ADMISSIONS | Facility: MEDICAL CENTER | Age: 58
End: 2023-12-07
Attending: OBSTETRICS & GYNECOLOGY
Payer: COMMERCIAL

## 2023-12-07 VITALS — WEIGHT: 293 LBS | BODY MASS INDEX: 59.91 KG/M2 | SYSTOLIC BLOOD PRESSURE: 143 MMHG | DIASTOLIC BLOOD PRESSURE: 68 MMHG

## 2023-12-07 DIAGNOSIS — N84.0 ENDOMETRIAL POLYP: ICD-10-CM

## 2023-12-07 DIAGNOSIS — N95.0 POSTMENOPAUSAL BLEEDING: Primary | ICD-10-CM

## 2023-12-07 PROCEDURE — 3077F SYST BP >= 140 MM HG: CPT | Performed by: OBSTETRICS & GYNECOLOGY

## 2023-12-07 PROCEDURE — 99214 OFFICE O/P EST MOD 30 MIN: CPT | Performed by: OBSTETRICS & GYNECOLOGY

## 2023-12-07 PROCEDURE — 3078F DIAST BP <80 MM HG: CPT | Performed by: OBSTETRICS & GYNECOLOGY

## 2023-12-07 RX ORDER — ALBUTEROL SULFATE 90 UG/1
2 AEROSOL, METERED RESPIRATORY (INHALATION) EVERY 6 HOURS PRN
Qty: 8.5 EACH | Refills: 2 | Status: SHIPPED | OUTPATIENT
Start: 2023-12-07 | End: 2024-03-19

## 2023-12-07 ASSESSMENT — FIBROSIS 4 INDEX: FIB4 SCORE: 0.99

## 2023-12-08 ENCOUNTER — HOSPITAL ENCOUNTER (OUTPATIENT)
Facility: MEDICAL CENTER | Age: 58
End: 2023-12-08
Attending: OBSTETRICS & GYNECOLOGY | Admitting: OBSTETRICS & GYNECOLOGY
Payer: COMMERCIAL

## 2023-12-08 ENCOUNTER — APPOINTMENT (OUTPATIENT)
Dept: SLEEP MEDICINE | Facility: MEDICAL CENTER | Age: 58
End: 2023-12-08
Attending: PHYSICIAN ASSISTANT
Payer: COMMERCIAL

## 2023-12-08 ENCOUNTER — ANESTHESIA (OUTPATIENT)
Dept: SURGERY | Facility: MEDICAL CENTER | Age: 58
End: 2023-12-08
Payer: COMMERCIAL

## 2023-12-08 ENCOUNTER — ANESTHESIA EVENT (OUTPATIENT)
Dept: SURGERY | Facility: MEDICAL CENTER | Age: 58
End: 2023-12-08
Payer: COMMERCIAL

## 2023-12-08 VITALS
TEMPERATURE: 98.6 F | HEIGHT: 65 IN | SYSTOLIC BLOOD PRESSURE: 128 MMHG | OXYGEN SATURATION: 90 % | BODY MASS INDEX: 48.82 KG/M2 | WEIGHT: 293 LBS | RESPIRATION RATE: 32 BRPM | HEART RATE: 61 BPM | DIASTOLIC BLOOD PRESSURE: 61 MMHG

## 2023-12-08 LAB — PATHOLOGY CONSULT NOTE: NORMAL

## 2023-12-08 PROCEDURE — 160046 HCHG PACU - 1ST 60 MINS PHASE II: Performed by: OBSTETRICS & GYNECOLOGY

## 2023-12-08 PROCEDURE — 160047 HCHG PACU  - EA ADDL 30 MINS PHASE II: Performed by: OBSTETRICS & GYNECOLOGY

## 2023-12-08 PROCEDURE — 160035 HCHG PACU - 1ST 60 MINS PHASE I: Performed by: OBSTETRICS & GYNECOLOGY

## 2023-12-08 PROCEDURE — 160002 HCHG RECOVERY MINUTES (STAT): Performed by: OBSTETRICS & GYNECOLOGY

## 2023-12-08 PROCEDURE — 160029 HCHG SURGERY MINUTES - 1ST 30 MINS LEVEL 4: Performed by: OBSTETRICS & GYNECOLOGY

## 2023-12-08 PROCEDURE — 160048 HCHG OR STATISTICAL LEVEL 1-5: Performed by: OBSTETRICS & GYNECOLOGY

## 2023-12-08 PROCEDURE — 88305 TISSUE EXAM BY PATHOLOGIST: CPT

## 2023-12-08 PROCEDURE — 160009 HCHG ANES TIME/MIN: Performed by: OBSTETRICS & GYNECOLOGY

## 2023-12-08 PROCEDURE — 700105 HCHG RX REV CODE 258: Mod: UD | Performed by: OBSTETRICS & GYNECOLOGY

## 2023-12-08 PROCEDURE — 160025 RECOVERY II MINUTES (STATS): Performed by: OBSTETRICS & GYNECOLOGY

## 2023-12-08 PROCEDURE — 160041 HCHG SURGERY MINUTES - EA ADDL 1 MIN LEVEL 4: Performed by: OBSTETRICS & GYNECOLOGY

## 2023-12-08 PROCEDURE — 700101 HCHG RX REV CODE 250: Mod: UD | Performed by: ANESTHESIOLOGY

## 2023-12-08 PROCEDURE — 58558 HYSTEROSCOPY BIOPSY: CPT | Performed by: OBSTETRICS & GYNECOLOGY

## 2023-12-08 PROCEDURE — 700111 HCHG RX REV CODE 636 W/ 250 OVERRIDE (IP): Mod: UD | Performed by: ANESTHESIOLOGY

## 2023-12-08 RX ORDER — SODIUM CHLORIDE, SODIUM LACTATE, POTASSIUM CHLORIDE, CALCIUM CHLORIDE 600; 310; 30; 20 MG/100ML; MG/100ML; MG/100ML; MG/100ML
INJECTION, SOLUTION INTRAVENOUS CONTINUOUS
Status: DISCONTINUED | OUTPATIENT
Start: 2023-12-08 | End: 2023-12-08 | Stop reason: HOSPADM

## 2023-12-08 RX ORDER — ROCURONIUM BROMIDE 10 MG/ML
INJECTION, SOLUTION INTRAVENOUS PRN
Status: DISCONTINUED | OUTPATIENT
Start: 2023-12-08 | End: 2023-12-08 | Stop reason: SURG

## 2023-12-08 RX ORDER — DEXAMETHASONE SODIUM PHOSPHATE 4 MG/ML
INJECTION, SOLUTION INTRA-ARTICULAR; INTRALESIONAL; INTRAMUSCULAR; INTRAVENOUS; SOFT TISSUE PRN
Status: DISCONTINUED | OUTPATIENT
Start: 2023-12-08 | End: 2023-12-08 | Stop reason: SURG

## 2023-12-08 RX ORDER — LABETALOL HYDROCHLORIDE 5 MG/ML
5 INJECTION, SOLUTION INTRAVENOUS
Status: DISCONTINUED | OUTPATIENT
Start: 2023-12-08 | End: 2023-12-08 | Stop reason: HOSPADM

## 2023-12-08 RX ORDER — EPHEDRINE SULFATE 50 MG/ML
5 INJECTION, SOLUTION INTRAVENOUS
Status: DISCONTINUED | OUTPATIENT
Start: 2023-12-08 | End: 2023-12-08 | Stop reason: HOSPADM

## 2023-12-08 RX ORDER — MIDAZOLAM HYDROCHLORIDE 1 MG/ML
INJECTION INTRAMUSCULAR; INTRAVENOUS PRN
Status: DISCONTINUED | OUTPATIENT
Start: 2023-12-08 | End: 2023-12-08 | Stop reason: SURG

## 2023-12-08 RX ORDER — ACETAMINOPHEN 500 MG
1000 TABLET ORAL ONCE
Status: DISCONTINUED | OUTPATIENT
Start: 2023-12-08 | End: 2023-12-08 | Stop reason: HOSPADM

## 2023-12-08 RX ORDER — SUCCINYLCHOLINE CHLORIDE 20 MG/ML
INJECTION INTRAMUSCULAR; INTRAVENOUS PRN
Status: DISCONTINUED | OUTPATIENT
Start: 2023-12-08 | End: 2023-12-08 | Stop reason: SURG

## 2023-12-08 RX ORDER — ONDANSETRON 2 MG/ML
4 INJECTION INTRAMUSCULAR; INTRAVENOUS
Status: DISCONTINUED | OUTPATIENT
Start: 2023-12-08 | End: 2023-12-08 | Stop reason: HOSPADM

## 2023-12-08 RX ORDER — ONDANSETRON 2 MG/ML
INJECTION INTRAMUSCULAR; INTRAVENOUS PRN
Status: DISCONTINUED | OUTPATIENT
Start: 2023-12-08 | End: 2023-12-08 | Stop reason: SURG

## 2023-12-08 RX ORDER — KETOROLAC TROMETHAMINE 30 MG/ML
INJECTION, SOLUTION INTRAMUSCULAR; INTRAVENOUS PRN
Status: DISCONTINUED | OUTPATIENT
Start: 2023-12-08 | End: 2023-12-08 | Stop reason: HOSPADM

## 2023-12-08 RX ORDER — OXYCODONE HCL 5 MG/5 ML
5 SOLUTION, ORAL ORAL
Status: DISCONTINUED | OUTPATIENT
Start: 2023-12-08 | End: 2023-12-08 | Stop reason: HOSPADM

## 2023-12-08 RX ORDER — EPHEDRINE SULFATE 50 MG/ML
INJECTION, SOLUTION INTRAVENOUS PRN
Status: DISCONTINUED | OUTPATIENT
Start: 2023-12-08 | End: 2023-12-08 | Stop reason: SURG

## 2023-12-08 RX ORDER — GLYCOPYRROLATE 0.2 MG/ML
INJECTION INTRAMUSCULAR; INTRAVENOUS PRN
Status: DISCONTINUED | OUTPATIENT
Start: 2023-12-08 | End: 2023-12-08 | Stop reason: SURG

## 2023-12-08 RX ORDER — DIPHENHYDRAMINE HYDROCHLORIDE 50 MG/ML
12.5 INJECTION INTRAMUSCULAR; INTRAVENOUS
Status: DISCONTINUED | OUTPATIENT
Start: 2023-12-08 | End: 2023-12-08 | Stop reason: HOSPADM

## 2023-12-08 RX ORDER — HYDRALAZINE HYDROCHLORIDE 20 MG/ML
5 INJECTION INTRAMUSCULAR; INTRAVENOUS
Status: DISCONTINUED | OUTPATIENT
Start: 2023-12-08 | End: 2023-12-08 | Stop reason: HOSPADM

## 2023-12-08 RX ORDER — OXYCODONE HCL 5 MG/5 ML
10 SOLUTION, ORAL ORAL
Status: DISCONTINUED | OUTPATIENT
Start: 2023-12-08 | End: 2023-12-08 | Stop reason: HOSPADM

## 2023-12-08 RX ORDER — HALOPERIDOL 5 MG/ML
1 INJECTION INTRAMUSCULAR
Status: DISCONTINUED | OUTPATIENT
Start: 2023-12-08 | End: 2023-12-08 | Stop reason: HOSPADM

## 2023-12-08 RX ORDER — MEPERIDINE HYDROCHLORIDE 25 MG/ML
6.25 INJECTION INTRAMUSCULAR; INTRAVENOUS; SUBCUTANEOUS
Status: DISCONTINUED | OUTPATIENT
Start: 2023-12-08 | End: 2023-12-08 | Stop reason: HOSPADM

## 2023-12-08 RX ADMIN — ROCURONIUM BROMIDE 5 MG: 50 INJECTION, SOLUTION INTRAVENOUS at 10:56

## 2023-12-08 RX ADMIN — FENTANYL CITRATE 50 MCG: 50 INJECTION, SOLUTION INTRAMUSCULAR; INTRAVENOUS at 10:58

## 2023-12-08 RX ADMIN — SODIUM CHLORIDE, POTASSIUM CHLORIDE, SODIUM LACTATE AND CALCIUM CHLORIDE: 600; 310; 30; 20 INJECTION, SOLUTION INTRAVENOUS at 09:41

## 2023-12-08 RX ADMIN — DEXAMETHASONE SODIUM PHOSPHATE 8 MG: 4 INJECTION INTRA-ARTICULAR; INTRALESIONAL; INTRAMUSCULAR; INTRAVENOUS; SOFT TISSUE at 10:58

## 2023-12-08 RX ADMIN — ONDANSETRON 4 MG: 2 INJECTION INTRAMUSCULAR; INTRAVENOUS at 11:29

## 2023-12-08 RX ADMIN — MIDAZOLAM HYDROCHLORIDE 2 MG: 1 INJECTION, SOLUTION INTRAMUSCULAR; INTRAVENOUS at 10:53

## 2023-12-08 RX ADMIN — KETOROLAC TROMETHAMINE 15 MG: 30 INJECTION, SOLUTION INTRAMUSCULAR; INTRAVENOUS at 11:29

## 2023-12-08 RX ADMIN — SUCCINYLCHOLINE CHLORIDE 200 MG: 20 INJECTION, SOLUTION INTRAMUSCULAR; INTRAVENOUS at 10:56

## 2023-12-08 RX ADMIN — GLYCOPYRROLATE 0.3 MG: 0.2 INJECTION INTRAMUSCULAR; INTRAVENOUS at 11:15

## 2023-12-08 RX ADMIN — FENTANYL CITRATE 50 MCG: 50 INJECTION, SOLUTION INTRAMUSCULAR; INTRAVENOUS at 11:25

## 2023-12-08 RX ADMIN — EPHEDRINE SULFATE 10 MG: 50 INJECTION, SOLUTION INTRAVENOUS at 11:10

## 2023-12-08 RX ADMIN — PROPOFOL 300 MG: 10 INJECTION, EMULSION INTRAVENOUS at 10:56

## 2023-12-08 ASSESSMENT — PAIN DESCRIPTION - PAIN TYPE
TYPE: SURGICAL PAIN

## 2023-12-08 ASSESSMENT — PAIN SCALES - GENERAL: PAIN_LEVEL: 0

## 2023-12-08 ASSESSMENT — FIBROSIS 4 INDEX: FIB4 SCORE: 0.99

## 2023-12-08 NOTE — ANESTHESIA TIME REPORT
Anesthesia Start and Stop Event Times       Date Time Event    12/8/2023 0942 Ready for Procedure     1053 Anesthesia Start     1140 Anesthesia Stop          Responsible Staff  12/08/23      Name Role Begin End    Daniel Obrien M.D. Anesth 1053 1140          Overtime Reason:  no overtime (within assigned shift)    Comments:

## 2023-12-08 NOTE — OP REPORT
Operative Report - Operative Hysteroscopy    Patient: An Rainey  MRN: [id number]     YOB: 1965   Age: 58 y.o.   Sex:female  Unit: [facility]  Room/Bed: R OR POOL/NONE  Location: [facility]            DATE OF OPERATION: 2023     Pre-operative Diagnosis: post-menopausal bleeding  Post-operative Diagnosis: same   Procedure: Hysteroscopy/D&C/polypectomy  Anesthesia: General   Antibiotics: none  Surgeon(s):Cole  Findings:   1.  Endometrial cavity with thickened endometrium and endometrial polyp with possible thickening or mass noted anteriorly, ?fibroid  Complications: none   Specimens: none   EBL: Minimal   Hysteroscopic Fluid Deficit:  260mL deficit of NS    Indications:   Patient is a 58 y.o.  with postmenopausal bleeding. She did have a negative office endometrial biopsy but polyp was noted protruding from cervix and was not able to be removed in office so we opted for operative hysteroscopy. Given thickened endometrium on US there was suspicion to have more endometrial polyps as EMB sample did obtain some polypoid tissue. Success rate, risks, and benefits of the procedure were discussed with patient at length.  The patient is aware of risk of infection, bleeding, fluid overload, blood clot in the leg or lungs, perforation of the uterus, anesthesia complications, and possible conversion to laparotomy.  All patient's questions were thoroughly answered prior to the procedure and she agreed to the procedure.    Procedure:   The patient was taken to the operating room and placed under general anesthesia. She was then prepped and draped in the normal sterile fashion in the dorsal lithotomy position. [place] speculum was placed into the vagina and the anterior lip of the cervix was grasped with a(n) Allis and pulled into view. The cervix was dilated to accommodate the scope and hysterscope was introduced into the uterus.  A polyp was noted within  the cervical canal and was initially obstructing visualization so myosure was introduced and this removed.  After, further into the cavity, an additional polyp was found arising from the left sidewall and extending right. This was removed.  Last, the anterior uterine lining appeared thickened and rounded suggesting either a questionable fibroid or mass.  This was extensively thinned and sampled with the myosure until a more open cavity was noted and a complete 360 degree circumferential sampling/curettage of the endometrium was performed.  Hysteroscope and myosure were then removed from the uterus and sharp curettage performed for final sampling.    All instruments were removed from the uterus, cervix and vagina, and hemostasis of the cervix was observed. At the end of the procedure, total fluid deficit was 260mL.     Sponge and instrument counts were correct at the end of the procedure and the patient was taken to recovery in good condition      Leola Cole D.O.

## 2023-12-08 NOTE — DISCHARGE INSTRUCTIONS
If any questions arise, call your provider.  If your provider is not available, please feel free to call the Surgical Center at [phone].    MEDICATIONS: Resume taking daily medication.  Take prescribed pain medication with food.  If no medication is prescribed, you may take non-aspirin pain medication if needed.  PAIN MEDICATION CAN BE VERY CONSTIPATING.  Take a stool softener or laxative such as senokot, pericolace, or milk of magnesia if needed.    Last pain medication given at:    Toradol (anti-inflammatory like Ibuprofen/Motrin at 11:30 am, next dose of Ibuprofen/Motrin after 5:30 pm, take with food      What to Expect Post Anesthesia    Rest and take it easy for the first 24 hours.  A responsible adult is recommended to remain with you during that time.  It is normal to feel sleepy.  We encourage you to not do anything that requires balance, judgment or coordination.    FOR 24 HOURS DO NOT:  Drive, operate machinery or run household appliances.  Drink beer or alcoholic beverages.  Make important decisions or sign legal documents.    To avoid nausea, slowly advance diet as tolerated, avoiding spicy or greasy foods for the first day.  Add more substantial food to your diet according to your provider's instructions.  Babies can be fed formula or breast milk as soon as they are hungry.  INCREASE FLUIDS AND FIBER TO AVOID CONSTIPATION.    MILD FLU-LIKE SYMPTOMS ARE NORMAL.  YOU MAY EXPERIENCE GENERALIZED MUSCLE ACHES, THROAT IRRITATION, HEADACHE AND/OR SOME NAUSEA.

## 2023-12-08 NOTE — H&P (VIEW-ONLY)
GYNECOLOGY PRE-OPERATIVE HISTORY AND PHYSICAL    CC: Pre-op Exam      HPI: Patient is a 58 y.o.  who presents for her pre-operative history and physical. She is scheduled to undergo hysteroscopy, D&C polypectomy for visualized endometrial polyp that could not be removed in office and is likely the source of her PMB.  Her office EMB was negative.     She is on eliquis but we discussed this is a low risk bleeding procedure and continuing Eliquis except for AM dose is ok.      OBSTETRIC HISTORY:  OB History    Para Term  AB Living   2 2 2     2   SAB IAB Ectopic Molar Multiple Live Births             2      # Outcome Date GA Lbr Kalyan/2nd Weight Sex Delivery Anes PTL Lv   2 Term  40w0d   M Vag-Spont  N RICHAR   1 Term  40w0d   F Vag-Spont  N RICHAR       MEDICAL HISTORY:  Past Medical History:   Diagnosis Date    Allergy     Arrhythmia 2022    Afib    Breath shortness 2022    With exertion and seasonal allergies.    GERD (gastroesophageal reflux disease)     History of anemia     HTN (hypertension) 2019    Hypertension     Lymph edema     Bilateral lower extremities and feet.    Seasonal allergies     Sleep apnea     no cpap at this time    Snoring        MEDICATIONS:  Current Outpatient Medications on File Prior to Visit   Medication Sig Dispense Refill    albuterol 108 (90 Base) MCG/ACT Aero Soln inhalation aerosol INHALE 2 PUFFS BY MOUTH EVERY 6 HOURS AS NEEDED FOR SHORTNESS OF BREATH 8.5 Each 2    lisinopril (PRINIVIL) 20 MG Tab TAKE 1 TABLET BY MOUTH EVERY DAY 90 Tablet 3    amLODIPine (NORVASC) 10 MG Tab Take 10 mg by mouth every morning.      Multiple Vitamin (MULTIVITAMIN PO) Take 1 Tablet by mouth every morning.      Omega-3 Fatty Acids (FISH OIL PO) Take 1 Capsule by mouth every morning.      B Complex Vitamins (VITAMIN B COMPLEX) Tab Take 1 Tablet by mouth every morning.      BIOTIN PO Take 1 Tablet by mouth every morning.      CALCIUM PO Take 1 Tablet by mouth every  morning.      apixaban (ELIQUIS) 5mg Tab Take 1 Tablet by mouth 2 times a day. 180 Tablet 3    sertraline (ZOLOFT) 25 MG tablet Take 1 Tablet by mouth every day. 90 Tablet 3    flecainide (TAMBOCOR) 50 MG tablet Take 2 Tablets by mouth 2 times a day. 360 Tablet 2    VITAMIN D PO Take 1 Tablet by mouth every morning.      fluticasone (FLONASE) 50 MCG/ACT nasal spray Administer 1 Spray into affected nostril(S) every day.      Semaglutide (WEGOVY) 0.25 MG/0.5ML Solution Auto-injector Pen-injector Inject 0.5 mL under the skin every 7 days. (Patient taking differently: Inject 0.25 mg under the skin every 7 days. Not started) 0.5 mL 2    metoprolol SR (TOPROL XL) 25 MG TABLET SR 24 HR Take 1 Tablet by mouth every evening. (Patient not taking: Reported on 12/7/2023) 90 Tablet 3     No current facility-administered medications on file prior to visit.       ALLERGIES / REACTIONS:  No Known Allergies    FAMILY HISTORY:  Family History   Problem Relation Age of Onset    Cancer Mother     Cancer Father     Cancer Sister     Cancer Maternal Grandmother        SURGICAL HISTORY:  No past surgical history on file.    SOCIAL HISTORY:   reports that she has never smoked. She has never used smokeless tobacco. She reports current alcohol use. She reports that she does not use drugs.  Social History     Socioeconomic History    Marital status:      Spouse name: Not on file    Number of children: Not on file    Years of education: Not on file    Highest education level: Not on file   Occupational History    Not on file   Tobacco Use    Smoking status: Never    Smokeless tobacco: Never   Vaping Use    Vaping Use: Never used   Substance and Sexual Activity    Alcohol use: Yes     Comment: Rarely    Drug use: No    Sexual activity: Not on file   Other Topics Concern    Not on file   Social History Narrative    Not on file     Social Determinants of Health     Financial Resource Strain: Low Risk  (3/18/2021)    Overall Financial  Resource Strain (CARDIA)     Difficulty of Paying Living Expenses: Not hard at all   Food Insecurity: No Food Insecurity (3/18/2021)    Hunger Vital Sign     Worried About Running Out of Food in the Last Year: Never true     Ran Out of Food in the Last Year: Never true   Transportation Needs: No Transportation Needs (3/18/2021)    PRAPARE - Transportation     Lack of Transportation (Medical): No     Lack of Transportation (Non-Medical): No   Physical Activity: Not on file   Stress: Not on file   Social Connections: Not on file   Intimate Partner Violence: Not on file   Housing Stability: Not on file         ROS:  General: Fever: no  HEENT: Sore Throat: no  CV: Chest Pain: no  Repiratory: Shortness of Breath: no  GI: Abdominal pain: no  : Dysuria: no    PHYSICAL EXAMINATION:  Vital Signs:   Vitals:    23 1530   BP: (!) 143/68   BP Location: Right arm   Patient Position: Sitting   BP Cuff Size: Large adult long   Weight: (!) 360 lb     Appearance/Psychiatric: She does not appear anxious.  Constitutional: The patient is well nourished.  Neck: Neck appears symmetric.  Heart: regular rate and rhythm  Lungs:clear to auscultation, Respirations unlabored, and no use of accessory muscles of inspiration noted  Abd: Soft, flat and non-tender and No masses or organomegaly  Extremeties: Legs are symmetric and without tenderness.  Skin: No rash observed.      LABS / IMAGING:  US from 3/2023:  UTERUS:  The uterus measures 4.08 cm x 7.87 cm x 5.73 cm.  The uterine myometrium is slightly heterogeneous. There is a 1.3 x 1.4 cm hypoechoic lesion in the uterine fundus on the left side.     The endometrial echo complex measures 1.28 cm.  The endometrium demonstrates no abnormal vascularity. There is no mass identified.      ASSESSMENT AND PLAN:  58 y.o.      1. Postmenopausal bleeding        2. Endometrial polyp            Surgery indications: endometrial polyp on exam and PMB with recent negative EMB. Low concern for  malignancy. Will perform hysteroscopy to look for additional polyps and remove endometrial/endocervical polyps    Surgeries planned: hysteroscopy/D&C/polypectomy   - reviewed R/B of procedure including bleeding, cramping, infection, fluid overload, all of which are rare/unlikely   - discussed pre/postop expectations, arrival time, discharge time   - questions answered        Leola Cole D.O.

## 2023-12-08 NOTE — ANESTHESIA PROCEDURE NOTES
Airway    Date/Time: 12/8/2023 10:57 AM    Performed by: Daniel Obrien M.D.  Authorized by: Daniel Obrien M.D.    Location:  OR  Urgency:  Elective  Indications for Airway Management:  Anesthesia      Spontaneous Ventilation: absent    Sedation Level:  Deep  Preoxygenated: Yes    Patient Position:  Sniffing  Mask Difficulty Assessment:  0 - not attempted  Final Airway Type:  Endotracheal airway  Final Endotracheal Airway:  ETT  Cuffed: Yes    Technique Used for Successful ETT Placement:  Video laryngoscopy    Insertion Site:  Oral  Blade Type:  Glide  Laryngoscope Blade/Videolaryngoscope Blade Size:  3  ETT Size (mm):  7.0  Measured from:  Teeth  ETT to Teeth (cm):  22  Placement Verified by: auscultation and capnometry    Cormack-Lehane Classification:  Grade I - full view of glottis  Number of Attempts at Approach:  1

## 2023-12-08 NOTE — ANESTHESIA PREPROCEDURE EVALUATION
Case: [id number] Date/Time: 12/08/23 1015    Procedures:       HYSTEROSCOPY DILATION AND CURETTAGE POLYPECTOMY      DILATION AND CURETTAGE    Pre-op diagnosis: POSTMENOPAUSAL BLEEDING    Location: [facility] ROOM 23 / SURGERY SAME DAY [facility]    Surgeons: Leola Cole D.O.            Relevant Problems   ANESTHESIA   (positive) EVERARDO (obstructive sleep apnea)      CARDIAC   (positive) AF (atrial fibrillation) (HCC)   (positive) Atrial fibrillation with rapid ventricular response (HCC)   (positive) HTN (hypertension)      GI   (positive) GERD (gastroesophageal reflux disease)      Other   (positive) Depression   (positive) Morbid obesity (HCC)   (positive) Morbid obesity with BMI of 60.0-69.9, adult (HCC)       Physical Exam    Airway   Mallampati: II  TM distance: >3 FB  Neck ROM: full       Cardiovascular - normal exam  Rhythm: regular  Rate: normal  (-) murmur     Dental - normal exam           Pulmonary - normal exam  Breath sounds clear to auscultation     Abdominal    Neurological - normal exam                   Anesthesia Plan    ASA 3   ASA physical status 3 criteria: morbid obesity - BMI greater than or equal to 40    Plan - general       Airway plan will be ETT          Induction: intravenous    Postoperative Plan: Postoperative administration of opioids is intended.    Pertinent diagnostic labs and testing reviewed    Informed Consent:    Anesthetic plan and risks discussed with patient.    Use of blood products discussed with: patient whom consented to blood products.

## 2023-12-08 NOTE — ANESTHESIA POSTPROCEDURE EVALUATION
Patient: An Rainey    Procedure Summary       Date: 12/08/23 Room / Location: [facility] ROOM 23 / SURGERY SAME DAY [facility]    Anesthesia Start: 1053 Anesthesia Stop: 1140    Procedures:       HYSTEROSCOPY DILATION AND CURETTAGE POLYPECTOMY (Uterus)      DILATION AND CURETTAGE (Uterus) Diagnosis: (POSTMENOPAUSAL BLEEDING)    Surgeons: Leola Cole D.O. Responsible Provider: Daniel Obrien M.D.    Anesthesia Type: general ASA Status: 3            Final Anesthesia Type: general  Last vitals  BP   Blood Pressure: 128/61    Temp   37 °C (98.6 °F)    Pulse   61   Resp   (!) 32    SpO2   90 %      Anesthesia Post Evaluation    Patient location during evaluation: PACU  Patient participation: complete - patient participated  Level of consciousness: awake and alert  Pain score: 0    Airway patency: patent  Anesthetic complications: no  Cardiovascular status: hemodynamically stable  Respiratory status: acceptable  Hydration status: euvolemic    PONV: none          No notable events documented.     Nurse Pain Score: 0 (NPRS)

## 2023-12-08 NOTE — OR NURSING
1139 received patient from OR. Report from Anesthesiologist and OR RN. Patient on 8L at 99 % O2. VSS. Monitor connected. Oral airway in place, d/c on arrival. S/P hysteroscopy; d&c, polypectomy, peripad in place with no drainage     1208 Updated patient's family, states cannot come for another 40 minutes     1315 Patient up to bathroom to void and get dressed     1330 Patient escorted out to responsible adult via wheelchair by RN. Belongings with patient, ambulated with steady gait. Discharge paperwork and instructions reviewed and signed by daughter, and sent with patient.

## 2023-12-20 ENCOUNTER — GYNECOLOGY VISIT (OUTPATIENT)
Dept: OBGYN | Facility: CLINIC | Age: 58
End: 2023-12-20
Payer: COMMERCIAL

## 2023-12-20 VITALS — WEIGHT: 293 LBS | DIASTOLIC BLOOD PRESSURE: 85 MMHG | SYSTOLIC BLOOD PRESSURE: 167 MMHG | BODY MASS INDEX: 59.24 KG/M2

## 2023-12-20 DIAGNOSIS — Z09 POSTOP CHECK: Primary | ICD-10-CM

## 2023-12-20 PROCEDURE — 3077F SYST BP >= 140 MM HG: CPT | Performed by: OBSTETRICS & GYNECOLOGY

## 2023-12-20 PROCEDURE — 99024 POSTOP FOLLOW-UP VISIT: CPT | Performed by: OBSTETRICS & GYNECOLOGY

## 2023-12-20 PROCEDURE — 3079F DIAST BP 80-89 MM HG: CPT | Performed by: OBSTETRICS & GYNECOLOGY

## 2023-12-20 ASSESSMENT — FIBROSIS 4 INDEX: FIB4 SCORE: 0.99

## 2023-12-20 NOTE — PROGRESS NOTES
CC: Post-operative check    Date of Surgery:     HPI: Patient is a 58 y.o. year old  who presents for follow up after hysteroscopy D&C for PMB and endometrial polyp. She reports light spotting after surgery which seems to have stopped yesterday. Minimal cramping. No concerns    ROS:   General: Fever: no  GI: Abdominal pain: no  : Vaginal bleeding: no    PFSH:  I personally reviewed the past medical and surgical histories.     PHYSICAL EXAMINATION:  Vital Signs:   Vitals:    23 0812   BP: (!) 167/85   BP Location: Left arm   Patient Position: Sitting   BP Cuff Size: Adult   Weight: (!) 356 lb     Appearance/Psychiatric: in no apparent distress and well developed and well nourished  Heart: She does not have edema.  Lungs:Respiratory effort is normal.  Abd: soft, nontender, no rebound or guarding  Pelvic: deferred    PATHOLOGY:  FINAL DIAGNOSIS:     A. Endometrial polyp and curettings:          Fragments of benign endometrium with fragments consistent with           benign endometrial polyp as well as glands noted within           underlying myometrium suggestive for adenomyosis.          Small fragment of benign squamous mucosa and endocervical tissue           noted.          No atypical hyperplasia or malignancy identified.     ASSESSMENT AND PLAN:  58 y.o.    An was seen today for post-op.    Diagnoses and all orders for this visit:    Postop check   - reviewed benign path and no concerns at this time for hyperplasia or malignancy   - with removal of polyp, hopefully no further PMB   - return in July for annual      Leola Cole D.O.  2023

## 2024-01-02 ENCOUNTER — OFFICE VISIT (OUTPATIENT)
Dept: CARDIOLOGY | Facility: MEDICAL CENTER | Age: 59
End: 2024-01-02
Payer: COMMERCIAL

## 2024-01-02 VITALS
SYSTOLIC BLOOD PRESSURE: 122 MMHG | HEART RATE: 55 BPM | WEIGHT: 293 LBS | RESPIRATION RATE: 18 BRPM | OXYGEN SATURATION: 94 % | HEIGHT: 65 IN | DIASTOLIC BLOOD PRESSURE: 76 MMHG | BODY MASS INDEX: 48.82 KG/M2

## 2024-01-02 DIAGNOSIS — I48.11 LONGSTANDING PERSISTENT ATRIAL FIBRILLATION (HCC): ICD-10-CM

## 2024-01-02 DIAGNOSIS — E66.01 CLASS 3 SEVERE OBESITY DUE TO EXCESS CALORIES WITH SERIOUS COMORBIDITY AND BODY MASS INDEX (BMI) OF 50.0 TO 59.9 IN ADULT (HCC): ICD-10-CM

## 2024-01-02 DIAGNOSIS — G47.33 OSA (OBSTRUCTIVE SLEEP APNEA): ICD-10-CM

## 2024-01-02 DIAGNOSIS — I10 PRIMARY HYPERTENSION: ICD-10-CM

## 2024-01-02 PROBLEM — I48.91 ATRIAL FIBRILLATION WITH RAPID VENTRICULAR RESPONSE (HCC): Status: RESOLVED | Noted: 2022-10-10 | Resolved: 2024-01-02

## 2024-01-02 PROBLEM — E66.09 OBESITY DUE TO EXCESS CALORIES WITH SERIOUS COMORBIDITY: Status: RESOLVED | Noted: 2023-11-07 | Resolved: 2024-01-02

## 2024-01-02 PROCEDURE — 99214 OFFICE O/P EST MOD 30 MIN: CPT

## 2024-01-02 PROCEDURE — 3074F SYST BP LT 130 MM HG: CPT

## 2024-01-02 PROCEDURE — 3078F DIAST BP <80 MM HG: CPT

## 2024-01-02 PROCEDURE — 99213 OFFICE O/P EST LOW 20 MIN: CPT

## 2024-01-02 RX ORDER — FERROUS SULFATE 325(65) MG
TABLET ORAL
COMMUNITY
Start: 2023-12-28

## 2024-01-02 RX ORDER — ASCORBIC ACID 500 MG
TABLET ORAL
COMMUNITY
Start: 2023-12-05

## 2024-01-02 ASSESSMENT — ENCOUNTER SYMPTOMS
PND: 0
GASTROINTESTINAL NEGATIVE: 1
MUSCULOSKELETAL NEGATIVE: 1
DEPRESSION: 0
CONSTITUTIONAL NEGATIVE: 1
NEUROLOGICAL NEGATIVE: 1
ORTHOPNEA: 0
PALPITATIONS: 1
SHORTNESS OF BREATH: 1
EYES NEGATIVE: 1
NERVOUS/ANXIOUS: 0

## 2024-01-02 ASSESSMENT — FIBROSIS 4 INDEX: FIB4 SCORE: 0.99

## 2024-01-02 NOTE — PROGRESS NOTES
"Chief Complaint   Patient presents with    Atrial Fibrillation     F/v dx: Atrial fibrillation with rapid ventricular response (HCC)    Hypertension       Subjective     An Rainey is a 58 y.o. female who presents today for 6 month follow up. They have a history of atrial fibrillation on flecainide, hypertension, lymphedema EVERARDO, obesity, and cellulitis.    Last seen by Alma Ye on 6/1/2023 at that visit she was doing well from cardiac perspective, occasional palpitations, slightly elevated blood pressure, recommended to follow-up with sleep medicine for EVERARDO.    Today 1/2/2024 She has stopped taking metoprolol. She takes it when she feels some \"twinges\" in her chest, these most often occur at work.  She does have baseline lower extremity edema, occasional dyspnea with exertion.    She works for [employer]    Activity: She has been pretty sedentary lately, planning to start walking more with her dog.  She is also planning to go to [employer] [facility] with her friend      Past Medical History:   Diagnosis Date    Allergy     Arrhythmia 11/11/2022    Afib    Breath shortness 11/11/2022    With exertion and seasonal allergies.    GERD (gastroesophageal reflux disease)     History of anemia     HTN (hypertension) 04/27/2019    Hypertension     Lymph edema     Bilateral lower extremities and feet.    Seasonal allergies     Sleep apnea     no cpap at this time    Snoring      Past Surgical History:   Procedure Laterality Date    HYSTEROSCOPY WITH MYOSURE N/A 12/8/2023    Procedure: HYSTEROSCOPY DILATION AND CURETTAGE POLYPECTOMY;  Surgeon: Leola Cole D.O.;  Location: SURGERY SAME DAY [facility];  Service: Obstetrics    DILATION AND CURETTAGE N/A 12/8/2023    Procedure: DILATION AND CURETTAGE;  Surgeon: Leola Cole D.O.;  Location: SURGERY SAME DAY [facility];  Service: Obstetrics     Family History   Problem Relation Age of Onset    Cancer Mother     Cancer Father     Cancer Sister     Cancer Maternal " Grandmother      Social History     Socioeconomic History    Marital status:      Spouse name: Not on file    Number of children: Not on file    Years of education: Not on file    Highest education level: Not on file   Occupational History    Not on file   Tobacco Use    Smoking status: Never    Smokeless tobacco: Never   Vaping Use    Vaping Use: Never used   Substance and Sexual Activity    Alcohol use: Yes     Comment: Rarely    Drug use: No    Sexual activity: Not on file   Other Topics Concern    Not on file   Social History Narrative    Not on file     Social Determinants of Health     Financial Resource Strain: Low Risk  (3/18/2021)    Overall Financial Resource Strain (CARDIA)     Difficulty of Paying Living Expenses: Not hard at all   Food Insecurity: No Food Insecurity (3/18/2021)    Hunger Vital Sign     Worried About Running Out of Food in the Last Year: Never true     Ran Out of Food in the Last Year: Never true   Transportation Needs: No Transportation Needs (3/18/2021)    PRAPARE - Transportation     Lack of Transportation (Medical): No     Lack of Transportation (Non-Medical): No   Physical Activity: Not on file   Stress: Not on file   Social Connections: Not on file   Intimate Partner Violence: Not on file   Housing Stability: Not on file     No Known Allergies  Outpatient Encounter Medications as of 1/2/2024   Medication Sig Dispense Refill    ferrous sulfate 325 (65 Fe) MG tablet TAKE 1 TABLET BY MOUTH EVERY 48 HOURS FOR 90 DAYS.      ascorbic acid (ASCORBIC ACID) 500 MG Tab TAKE 1 TABLET BY MOUTH EVERY 48 HOURS. TAKE WITH IRON SUPPLEMENT. THIS IS ALSO OVER THE COUNTER      albuterol 108 (90 Base) MCG/ACT Aero Soln inhalation aerosol INHALE 2 PUFFS BY MOUTH EVERY 6 HOURS AS NEEDED FOR SHORTNESS OF BREATH 8.5 Each 2    lisinopril (PRINIVIL) 20 MG Tab TAKE 1 TABLET BY MOUTH EVERY DAY 90 Tablet 3    amLODIPine (NORVASC) 10 MG Tab Take 10 mg by mouth every morning.      Multiple Vitamin  "(MULTIVITAMIN PO) Take 1 Tablet by mouth every morning.      Omega-3 Fatty Acids (FISH OIL PO) Take 1 Capsule by mouth every morning.      B Complex Vitamins (VITAMIN B COMPLEX) Tab Take 1 Tablet by mouth every morning.      BIOTIN PO Take 1 Tablet by mouth every morning.      CALCIUM PO Take 1 Tablet by mouth every morning.      apixaban (ELIQUIS) 5mg Tab Take 1 Tablet by mouth 2 times a day. 180 Tablet 3    sertraline (ZOLOFT) 25 MG tablet Take 1 Tablet by mouth every day. 90 Tablet 3    flecainide (TAMBOCOR) 50 MG tablet Take 2 Tablets by mouth 2 times a day. 360 Tablet 2    VITAMIN D PO Take 1 Tablet by mouth every morning.      fluticasone (FLONASE) 50 MCG/ACT nasal spray Administer 1 Spray into affected nostril(S) every day.      Semaglutide (WEGOVY) 0.25 MG/0.5ML Solution Auto-injector Pen-injector Inject 0.5 mL under the skin every 7 days. (Patient not taking: Reported on 1/2/2024) 0.5 mL 2    metoprolol SR (TOPROL XL) 25 MG TABLET SR 24 HR Take 1 Tablet by mouth every evening. (Patient not taking: Reported on 12/20/2023) 90 Tablet 3     No facility-administered encounter medications on file as of 1/2/2024.     Review of Systems   Constitutional: Negative.    HENT: Negative.     Eyes: Negative.    Respiratory:  Positive for shortness of breath.    Cardiovascular:  Positive for palpitations and leg swelling. Negative for chest pain, orthopnea and PND.   Gastrointestinal: Negative.    Genitourinary: Negative.    Musculoskeletal: Negative.    Skin: Negative.    Neurological: Negative.    Endo/Heme/Allergies: Negative.    Psychiatric/Behavioral:  Negative for depression. The patient is not nervous/anxious.               Objective     /76 (BP Location: Left arm, Patient Position: Sitting, BP Cuff Size: Adult)   Pulse (!) 55   Resp 18   Ht 1.651 m (5' 5\")   Wt (!) 160 kg (352 lb)   LMP 04/01/2019 (Approximate)   SpO2 94%   BMI 58.58 kg/m²     Physical Exam  Constitutional:       Appearance: Normal " appearance. She is obese.   HENT:      Head: Normocephalic and atraumatic.   Neck:      Vascular: No JVD.   Cardiovascular:      Rate and Rhythm: Normal rate and regular rhythm.      Pulses: Normal pulses.      Heart sounds: Normal heart sounds. No murmur heard.     No friction rub.   Pulmonary:      Effort: Pulmonary effort is normal. No respiratory distress.      Breath sounds: Normal breath sounds.   Abdominal:      General: There is no distension.      Palpations: Abdomen is soft.   Musculoskeletal:      Right lower leg: No edema.      Left lower leg: No edema.   Skin:     General: Skin is warm and dry.   Neurological:      General: No focal deficit present.      Mental Status: She is alert and oriented to person, place, and time.   Psychiatric:         Mood and Affect: Mood normal.         Behavior: Behavior normal.            Lab Results   Component Value Date/Time    CHOLSTRLTOT 167 10/11/2022 02:13 AM     (H) 10/11/2022 02:13 AM    HDL 41 10/11/2022 02:13 AM    TRIGLYCERIDE 123 10/11/2022 02:13 AM       Lab Results   Component Value Date/Time    SODIUM 142 11/02/2023 08:14 AM    POTASSIUM 4.8 11/02/2023 08:14 AM    CHLORIDE 105 11/02/2023 08:14 AM    CO2 26 11/02/2023 08:14 AM    GLUCOSE 98 11/02/2023 08:14 AM    BUN 11 11/02/2023 08:14 AM    CREATININE 0.72 11/02/2023 08:14 AM     Lab Results   Component Value Date/Time    ALKPHOSPHAT 84 11/02/2023 08:14 AM    ASTSGOT 20 11/02/2023 08:14 AM    ALTSGPT 17 11/02/2023 08:14 AM    TBILIRUBIN 0.3 11/02/2023 08:14 AM          Assessment & Plan     1. Longstanding persistent atrial fibrillation (HCC)        2. Primary hypertension        3. EVERARDO (obstructive sleep apnea)        4. Morbid obesity (HCC)            Medical Decision Making: Today's Assessment/Status/Plan:        Atrial fibrillation  -Longstanding persistent  -[id number] 2 for female sex and hypertension  -Currently on flecainide 100 mg twice a day, her intervals were stable at her last EKG, we  will repeat an EKG at her next appointment  -Change metoprolol to immediate release as she has been using it as needed  - continue eliquis    Hypertension  - good control  - continue lisinopril  - goal < 130/80     Obesity with EVERARDO   -She has been prescribed Wegovy but has not yet started it  -She has an upcoming appointment with pulmonary medicine for evaluation of her EVERARDO  -Discussed in extensive detail regarding lifestyle modifications, focusing on dietary changes.     Discussed following recommendations with the patient to improve dietary choices:  1.  Increase amount of whole foods and grains (fruits/vegetables from the produce section without processing).  2.  Pay special attention to the nutrition facts with goal of decreasing saturated fat, foods with high cholesterol and high overall fat content.  3.  Reducing portion size with healthy snack options  4.  Avoid processed/packaged/frozen meals with more than 5 ingredients on the label, or multiple ingredients you don't recognize.  5.  Avoiding red meat and replacing with fresh fruits, vegetables and lean meat.  6.  Minimize sugar.  This means any product which has added sugar in the label (soda, candy, and many processed foods).     Discussed ACC/AHA guidelines of cardio focused exercise for a minimum of 150 minutes/week to maintain healthy weight.      Follow-up with Ksenia CERVANTES in 6 months    This note was dictated using Dragon speech recognition software.

## 2024-01-04 ENCOUNTER — OFFICE VISIT (OUTPATIENT)
Dept: SLEEP MEDICINE | Facility: MEDICAL CENTER | Age: 59
End: 2024-01-04
Attending: PHYSICIAN ASSISTANT
Payer: COMMERCIAL

## 2024-01-04 VITALS
HEIGHT: 65 IN | BODY MASS INDEX: 48.82 KG/M2 | RESPIRATION RATE: 16 BRPM | OXYGEN SATURATION: 95 % | HEART RATE: 54 BPM | DIASTOLIC BLOOD PRESSURE: 72 MMHG | WEIGHT: 293 LBS | SYSTOLIC BLOOD PRESSURE: 120 MMHG

## 2024-01-04 DIAGNOSIS — G47.33 OSA (OBSTRUCTIVE SLEEP APNEA): ICD-10-CM

## 2024-01-04 DIAGNOSIS — E66.01 CLASS 3 SEVERE OBESITY DUE TO EXCESS CALORIES WITH SERIOUS COMORBIDITY AND BODY MASS INDEX (BMI) OF 50.0 TO 59.9 IN ADULT (HCC): ICD-10-CM

## 2024-01-04 PROCEDURE — 3074F SYST BP LT 130 MM HG: CPT | Performed by: PHYSICIAN ASSISTANT

## 2024-01-04 PROCEDURE — 99212 OFFICE O/P EST SF 10 MIN: CPT | Performed by: PHYSICIAN ASSISTANT

## 2024-01-04 PROCEDURE — 99213 OFFICE O/P EST LOW 20 MIN: CPT | Performed by: PHYSICIAN ASSISTANT

## 2024-01-04 PROCEDURE — 3078F DIAST BP <80 MM HG: CPT | Performed by: PHYSICIAN ASSISTANT

## 2024-01-04 ASSESSMENT — ENCOUNTER SYMPTOMS
PALPITATIONS: 1
WHEEZING: 1
TREMORS: 0
ORTHOPNEA: 1
COUGH: 0
SPUTUM PRODUCTION: 0
HEADACHES: 0
FEVER: 0
INSOMNIA: 1
DIZZINESS: 0
WEIGHT LOSS: 0
SINUS PAIN: 0
CHILLS: 0
SORE THROAT: 0
HEARTBURN: 1
SHORTNESS OF BREATH: 1

## 2024-01-04 ASSESSMENT — FIBROSIS 4 INDEX: FIB4 SCORE: 0.99

## 2024-01-04 NOTE — PATIENT INSTRUCTIONS
1-reviewed home sleep study  2-reviewed risks associated with untreated or under treated sleep apnea  3-will send order to [facility] and contact info provided  4-strategies for success   Minimum usage for insurance requirement is 4 hours per day avg and 70% of days    Therapeutic goals are 6-6.5 hours every day   Training period of 3-4 days and 30 min to 120 min awake usage    Transition to all night use   5-short term follow up   6-bring entire device for first visit

## 2024-01-04 NOTE — PROGRESS NOTES
"Chief Complaint   Patient presents with    Follow-Up    Results       HPI:  An Rainey is a 58 y.o. year old female here today for follow-up on sleep study results.  Patient previously evaluated in clinic by Dr. Louis Hadley on 9/21/2023.    Past Medical History: Hypertension, depression, morbid obesity, GERD, lymphedema, cellulitis lower extremities, anemia, anxiety, chronic nasal allergies, atrial fibrillation.    Vitals:  /72   Pulse (!) 54   Resp 16   Ht 1.651 m (5' 5\")   Wt (!) 160 kg (352 lb)   SpO2 95% BMI of 58.58 kg/m².    Recent Imaging: Echocardiogram obtained 10/10/2022 demonstrated using contrast to enhance visualization, normal left ventricular chamber size, grossly normal left ventricular systolic function, mild concentric left ventricular hypertrophy, LVEF estimated 55%, indeterminate diastolic function due to arrhythmia, dilated right ventricle with akinesis of the free wall-findings suspicious for PE, enlarged right atrium, no significant valvular dysfunction.    Overnight home sleep study obtained 10/11/2023 demonstrated findings consistent with severe obstructive sleep apnea with pAHI of 37.7 events per hour, pRDI 38.5 events per hour.  Low O2 sat of 77% with sats less than or equal to 88% for 43.4 minutes of evaluated time.  Recommendation for in lab titration study versus auto CPAP trial.      Sleep schedule goes to bed 730-8 PM but may not sleep until 11 PM or 12 AM, wakens 7 AM , and gets up during the night once or twice.   Symptoms  nonrestorative sleep, brain fog and low energy, snoring, grinding teeth bedtime, sometimes waking up gasping.  She reports sleeping propped up in bed.  She denies napping.     [place] Sleepiness Scale reported as 13/24 on 9/21/2023.          Review of Systems   Constitutional:  Positive for malaise/fatigue (moderate). Negative for chills, fever and weight loss.   HENT:  Positive for congestion (severe allergies). Negative for hearing " loss, nosebleeds, sinus pain, sore throat and tinnitus.    Eyes:         Needs new glasses   Respiratory:  Positive for shortness of breath (mostly with exertion) and wheezing. Negative for cough and sputum production.    Cardiovascular:  Positive for palpitations, orthopnea and leg swelling (lymphedema). Negative for chest pain.   Gastrointestinal:  Positive for heartburn (resolved on meds).        No dentures, missing 3-4 teeth, no swallowing issues   Neurological:  Negative for dizziness, tremors and headaches.   Psychiatric/Behavioral:  The patient has insomnia (falling asleep).        Past Medical History:   Diagnosis Date    Allergy     Arrhythmia 11/11/2022    Afib    Breath shortness 11/11/2022    With exertion and seasonal allergies.    GERD (gastroesophageal reflux disease)     History of anemia     HTN (hypertension) 04/27/2019    Hypertension     Lymph edema     Bilateral lower extremities and feet.    Seasonal allergies     Sleep apnea     no cpap at this time    Snoring        Past Surgical History:   Procedure Laterality Date    HYSTEROSCOPY WITH MYOSURE N/A 12/8/2023    Procedure: HYSTEROSCOPY DILATION AND CURETTAGE POLYPECTOMY;  Surgeon: Leola Cole D.O.;  Location: SURGERY SAME DAY [facility];  Service: Obstetrics    DILATION AND CURETTAGE N/A 12/8/2023    Procedure: DILATION AND CURETTAGE;  Surgeon: Leola Cole D.O.;  Location: SURGERY SAME DAY [facility];  Service: Obstetrics       Family History   Problem Relation Age of Onset    Cancer Mother     Cancer Father     Cancer Sister     Cancer Maternal Grandmother        Social History     Socioeconomic History    Marital status:      Spouse name: Not on file    Number of children: Not on file    Years of education: Not on file    Highest education level: Not on file   Occupational History    Not on file   Tobacco Use    Smoking status: Never    Smokeless tobacco: Never   Vaping Use    Vaping Use: Never used   Substance and Sexual  Activity    Alcohol use: Yes     Comment: Rarely    Drug use: No    Sexual activity: Not on file   Other Topics Concern    Not on file   Social History Narrative    Not on file     Social Determinants of Health     Financial Resource Strain: Low Risk  (3/18/2021)    Overall Financial Resource Strain (CARDIA)     Difficulty of Paying Living Expenses: Not hard at all   Food Insecurity: No Food Insecurity (3/18/2021)    Hunger Vital Sign     Worried About Running Out of Food in the Last Year: Never true     Ran Out of Food in the Last Year: Never true   Transportation Needs: No Transportation Needs (3/18/2021)    PRAPARE - Transportation     Lack of Transportation (Medical): No     Lack of Transportation (Non-Medical): No   Physical Activity: Not on file   Stress: Not on file   Social Connections: Not on file   Intimate Partner Violence: Not on file   Housing Stability: Not on file       Allergies as of 01/04/2024    (No Known Allergies)          Current medications as of today   Current Outpatient Medications   Medication Sig Dispense Refill    ferrous sulfate 325 (65 Fe) MG tablet TAKE 1 TABLET BY MOUTH EVERY 48 HOURS FOR 90 DAYS.      ascorbic acid (ASCORBIC ACID) 500 MG Tab TAKE 1 TABLET BY MOUTH EVERY 48 HOURS. TAKE WITH IRON SUPPLEMENT. THIS IS ALSO OVER THE COUNTER      metoprolol tartrate (LOPRESSOR) 25 MG Tab Take 1 Tablet by mouth 1 time a day as needed (for palpitations). 30 Tablet 11    albuterol 108 (90 Base) MCG/ACT Aero Soln inhalation aerosol INHALE 2 PUFFS BY MOUTH EVERY 6 HOURS AS NEEDED FOR SHORTNESS OF BREATH 8.5 Each 2    Semaglutide (WEGOVY) 0.25 MG/0.5ML Solution Auto-injector Pen-injector Inject 0.5 mL under the skin every 7 days. 0.5 mL 2    lisinopril (PRINIVIL) 20 MG Tab TAKE 1 TABLET BY MOUTH EVERY DAY 90 Tablet 3    amLODIPine (NORVASC) 10 MG Tab Take 10 mg by mouth every morning.      Multiple Vitamin (MULTIVITAMIN PO) Take 1 Tablet by mouth every morning.      Omega-3 Fatty Acids (FISH  OIL PO) Take 1 Capsule by mouth every morning.      B Complex Vitamins (VITAMIN B COMPLEX) Tab Take 1 Tablet by mouth every morning.      BIOTIN PO Take 1 Tablet by mouth every morning.      CALCIUM PO Take 1 Tablet by mouth every morning.      apixaban (ELIQUIS) 5mg Tab Take 1 Tablet by mouth 2 times a day. 180 Tablet 3    sertraline (ZOLOFT) 25 MG tablet Take 1 Tablet by mouth every day. 90 Tablet 3    flecainide (TAMBOCOR) 50 MG tablet Take 2 Tablets by mouth 2 times a day. 360 Tablet 2    VITAMIN D PO Take 1 Tablet by mouth every morning.      fluticasone (FLONASE) 50 MCG/ACT nasal spray Administer 1 Spray into affected nostril(S) every day.       No current facility-administered medications for this visit.         Physical Exam:   Gen:           Alert and oriented, No apparent distress. Mood and affect appropriate, normal interaction with examiner.   Hearing:     Grossly intact.  Nose:          Normal, no lesions or deformities.  Dentition:    fair dentition.   Oropharynx:   Tongue normal, posterior pharynx without erythema or exudate.  Mallampati Classification: IV  Neck:        Supple, trachea midline, no masses.  Respiratory Effort: No intercostal retractions or use of accessory muscles.   Gait and Station: Altered  Digits and Nails: No clubbing, cyanosis, petechiae, or nodes.   Skin:        No rashes, lesions or ulcers noted.               Ext:           No cyanosis or edema.      Immunizations:  Flu: Recommended  Pneumovax 23: 3/10/2014  Pfizer SARS CoV2 Vaccine: 5/13/2022, 6/22/2021, 6/1/2021    Assessment / Plan:  1. EVERARDO (obstructive sleep apnea)  - DME CPAP    Reviewed home sleep study results, reviewed risks associated with untreated or undertreated sleep apnea.  Patient wishes to proceed with auto CPAP trial at this time.  We will send orders to isolate and contact information was provided to patient.  Strategies for success reviewed including minimum usage requirements for insurance purposes,  therapeutic goals, etc.  Short-term follow-up will be needed with patient instructed to bring entire device to clinic for that first follow-up visit.    2. Obesity BMI > 58 kg/m²    Elevated BMI: Discussion regarding impact central adiposity on pulmonary function.  Encouraged to increase activity as tolerated and monitor nutritional intake.      Follow-up:   Return in about 3 months (around 4/4/2024) for Return with Clarisa Manzo PA-C.    Please note that this dictation was created using voice recognition software. I have made every reasonable attempt to correct obvious errors, but it is possible there are errors of grammar and possibly content that I did not discover before finalizing the note.

## 2024-01-06 DIAGNOSIS — F41.9 ANXIETY: ICD-10-CM

## 2024-01-09 RX ORDER — SERTRALINE HYDROCHLORIDE 25 MG/1
25 TABLET, FILM COATED ORAL DAILY
Qty: 90 TABLET | Refills: 3 | Status: SHIPPED | OUTPATIENT
Start: 2024-01-09

## 2024-02-21 RX ORDER — AMLODIPINE BESYLATE 10 MG/1
10 TABLET ORAL DAILY
Qty: 90 TABLET | Refills: 2 | Status: SHIPPED | OUTPATIENT
Start: 2024-02-21

## 2024-02-21 NOTE — TELEPHONE ENCOUNTER
Received request via: Pharmacy    Was the patient seen in the last year in this department? Yes    Does the patient have an active prescription (recently filled or refills available) for medication(s) requested? No    [facility]    Does the patient have snf Plus and need 100 day supply (blood pressure, diabetes and cholesterol meds only)? Patient does not have SCP

## 2024-03-04 DIAGNOSIS — I48.11 LONGSTANDING PERSISTENT ATRIAL FIBRILLATION (HCC): ICD-10-CM

## 2024-03-04 NOTE — TELEPHONE ENCOUNTER
Is the patient due for a refill? Yes    Was the patient seen the past year? Yes    Date of last office visit: 1/02/24    Does the patient have an upcoming appointment?  Yes   If yes, When? 7/11/24    Provider to refill:LH    Does the patients insurance require a 100 day supply?  No     90 day supply

## 2024-03-16 DIAGNOSIS — J45.20 MILD INTERMITTENT ASTHMA, UNSPECIFIED WHETHER COMPLICATED: ICD-10-CM

## 2024-03-18 NOTE — TELEPHONE ENCOUNTER
ALBUTEROL HFA (PROAIR) INHALER   Received request via: Pharmacy    Was the patient seen in the last year in this department? Yes    Does the patient have an active prescription (recently filled or refills available) for medication(s) requested? No    Pharmacy Name:  [facility]/pharmacy #[id number] - [place], [place] - [address]     Does the patient have long-term Plus and need 100 day supply (blood pressure, diabetes and cholesterol meds only)? Patient does not have SCP

## 2024-03-19 RX ORDER — ALBUTEROL SULFATE 90 UG/1
2 AEROSOL, METERED RESPIRATORY (INHALATION) EVERY 6 HOURS PRN
Qty: 8.5 EACH | Refills: 2 | Status: SHIPPED | OUTPATIENT
Start: 2024-03-19

## 2024-03-21 ENCOUNTER — APPOINTMENT (OUTPATIENT)
Dept: MEDICAL GROUP | Facility: MEDICAL CENTER | Age: 59
End: 2024-03-21
Payer: COMMERCIAL

## 2024-04-05 ENCOUNTER — OFFICE VISIT (OUTPATIENT)
Dept: SLEEP MEDICINE | Facility: MEDICAL CENTER | Age: 59
End: 2024-04-05
Attending: STUDENT IN AN ORGANIZED HEALTH CARE EDUCATION/TRAINING PROGRAM
Payer: COMMERCIAL

## 2024-04-05 VITALS
BODY MASS INDEX: 48.82 KG/M2 | HEIGHT: 65 IN | DIASTOLIC BLOOD PRESSURE: 84 MMHG | HEART RATE: 60 BPM | SYSTOLIC BLOOD PRESSURE: 128 MMHG | RESPIRATION RATE: 16 BRPM | WEIGHT: 293 LBS | OXYGEN SATURATION: 91 %

## 2024-04-05 DIAGNOSIS — G47.33 OSA (OBSTRUCTIVE SLEEP APNEA): ICD-10-CM

## 2024-04-05 PROCEDURE — 3079F DIAST BP 80-89 MM HG: CPT | Performed by: STUDENT IN AN ORGANIZED HEALTH CARE EDUCATION/TRAINING PROGRAM

## 2024-04-05 PROCEDURE — 99213 OFFICE O/P EST LOW 20 MIN: CPT | Performed by: STUDENT IN AN ORGANIZED HEALTH CARE EDUCATION/TRAINING PROGRAM

## 2024-04-05 PROCEDURE — 3074F SYST BP LT 130 MM HG: CPT | Performed by: STUDENT IN AN ORGANIZED HEALTH CARE EDUCATION/TRAINING PROGRAM

## 2024-04-05 ASSESSMENT — PATIENT HEALTH QUESTIONNAIRE - PHQ9: CLINICAL INTERPRETATION OF PHQ2 SCORE: 0

## 2024-04-05 ASSESSMENT — FIBROSIS 4 INDEX: FIB4 SCORE: 0.99

## 2024-04-05 NOTE — PROGRESS NOTES
[facility] Follow-up Visit    CC: Follow-up for his compliance visit after receiving CPAP machine      HPI:  An Rainey is a 58 y.o.female  with hypertension, depression, anxiety, obesity, history of atrial fibrillation, and obstructive sleep apnea on CPAP.  Presents today to follow-up regarding management of obstructive sleep apnea.    She reports she is having a difficult time with her CPAP machine.  She finds her mask comfortable.  However at night she feels when the pressure is increasing it can wake her up.  In addition she feels that she has a dry throat at night which is uncomfortable at times.    She believes she has increased the humidity level to 5.  She has not used her machine in the last couple weeks due to working longer hours and having some frustration with her machine.  She states she was considering returning it.    DME provider: [facility]   Device: Airsense   Mask: hybrid fullface  Aerophagia: No   Snoring: No   Dry mouth: Yes  Leak: No   Skin irritation: No   Chin strap: No     Sleep History  10/11/2023 HST showed an overall pAHI of 37.7 events an hour, minimum oxygen saturation 77%, time at or below 88% saturation 43 minutes    Patient Active Problem List    Diagnosis Date Noted    EVERARDO (obstructive sleep apnea) 11/07/2023    Endometrial polyp 09/26/2023    Hypomagnesemia 09/02/2023    Cellulitis of right lower extremity 09/01/2023    AF (atrial fibrillation) (HCC) 09/01/2023    Postmenopausal bleeding 03/30/2023    Cellulitis of left lower extremity 02/08/2022    Encounter for Papanicolaou smear for cervical cancer screening 07/27/2021    Tinnitus of right ear 05/18/2021    Anxiety 03/18/2021    Anemia 02/04/2020    Lymphedema 12/24/2019    Morbid obesity (HCC) 12/09/2019    GERD (gastroesophageal reflux disease) 12/09/2019    HTN (hypertension) 04/27/2019    Depression 04/27/2019       Past Medical History:   Diagnosis Date    Allergy     Arrhythmia 11/11/2022    Afib    Breath  shortness 11/11/2022    With exertion and seasonal allergies.    GERD (gastroesophageal reflux disease)     History of anemia     HTN (hypertension) 04/27/2019    Hypertension     Lymph edema     Bilateral lower extremities and feet.    Seasonal allergies     Sleep apnea     no cpap at this time    Snoring         Past Surgical History:   Procedure Laterality Date    HYSTEROSCOPY WITH MYOSURE N/A 12/8/2023    Procedure: HYSTEROSCOPY DILATION AND CURETTAGE POLYPECTOMY;  Surgeon: Leola Cole D.O.;  Location: SURGERY SAME DAY [facility];  Service: Obstetrics    DILATION AND CURETTAGE N/A 12/8/2023    Procedure: DILATION AND CURETTAGE;  Surgeon: Leola Cole D.O.;  Location: SURGERY SAME DAY [facility];  Service: Obstetrics       Family History   Problem Relation Age of Onset    Cancer Mother     Cancer Father     Cancer Sister     Cancer Maternal Grandmother        Social History     Socioeconomic History    Marital status: Legally      Spouse name: Not on file    Number of children: Not on file    Years of education: Not on file    Highest education level: Not on file   Occupational History    Not on file   Tobacco Use    Smoking status: Never    Smokeless tobacco: Never   Vaping Use    Vaping Use: Never used   Substance and Sexual Activity    Alcohol use: Yes     Comment: Rarely    Drug use: No    Sexual activity: Not on file   Other Topics Concern    Not on file   Social History Narrative    Not on file     Social Determinants of Health     Financial Resource Strain: Low Risk  (3/18/2021)    Overall Financial Resource Strain (CARDIA)     Difficulty of Paying Living Expenses: Not hard at all   Food Insecurity: No Food Insecurity (3/18/2021)    Hunger Vital Sign     Worried About Running Out of Food in the Last Year: Never true     Ran Out of Food in the Last Year: Never true   Transportation Needs: No Transportation Needs (3/18/2021)    PRAPARE - Transportation     Lack of Transportation (Medical):  No     Lack of Transportation (Non-Medical): No   Physical Activity: Not on file   Stress: Not on file   Social Connections: Not on file   Intimate Partner Violence: Not on file   Housing Stability: Not on file       Current Outpatient Medications   Medication Sig Dispense Refill    albuterol 108 (90 Base) MCG/ACT Aero Soln inhalation aerosol INHALE 2 PUFFS BY MOUTH EVERY 6 HOURS AS NEEDED FOR SHORTNESS OF BREATH 8.5 Each 2    metoprolol tartrate (LOPRESSOR) 25 MG Tab TAKE 1 TABLET BY MOUTH 1 TIME A DAY AS NEEDED (FOR PALPITATIONS). 90 Tablet 3    amLODIPine (NORVASC) 10 MG Tab TAKE 1 TABLET BY MOUTH ONCE DAILY FOR 90 DAYS 90 Tablet 2    sertraline (ZOLOFT) 25 MG tablet TAKE 1 TABLET BY MOUTH EVERY DAY 90 Tablet 3    ferrous sulfate 325 (65 Fe) MG tablet TAKE 1 TABLET BY MOUTH EVERY 48 HOURS FOR 90 DAYS.      ascorbic acid (ASCORBIC ACID) 500 MG Tab TAKE 1 TABLET BY MOUTH EVERY 48 HOURS. TAKE WITH IRON SUPPLEMENT. THIS IS ALSO OVER THE COUNTER      lisinopril (PRINIVIL) 20 MG Tab TAKE 1 TABLET BY MOUTH EVERY DAY 90 Tablet 3    Multiple Vitamin (MULTIVITAMIN PO) Take 1 Tablet by mouth every morning.      Omega-3 Fatty Acids (FISH OIL PO) Take 1 Capsule by mouth every morning.      B Complex Vitamins (VITAMIN B COMPLEX) Tab Take 1 Tablet by mouth every morning.      BIOTIN PO Take 1 Tablet by mouth every morning.      CALCIUM PO Take 1 Tablet by mouth every morning.      apixaban (ELIQUIS) 5mg Tab Take 1 Tablet by mouth 2 times a day. 180 Tablet 3    flecainide (TAMBOCOR) 50 MG tablet Take 2 Tablets by mouth 2 times a day. 360 Tablet 2    VITAMIN D PO Take 1 Tablet by mouth every morning.      fluticasone (FLONASE) 50 MCG/ACT nasal spray Administer 1 Spray into affected nostril(S) every day.      Semaglutide (WEGOVY) 0.25 MG/0.5ML Solution Auto-injector Pen-injector Inject 0.5 mL under the skin every 7 days. 0.5 mL 2     No current facility-administered medications for this visit.        ALLERGIES: Patient has no  "known allergies.    ROS  Constitutional: Denies fevers, Denies weight changes  Ears/Nose/Throat/Mouth: Denies nasal congestion or sore throat   Cardiovascular: Denies chest pain  Respiratory: Denies shortness of breath, Denies cough  Gastrointestinal/Hepatic: Denies nausea, vomiting  Sleep: see HPI      PHYSICAL EXAM  /84 (BP Location: Left arm, Patient Position: Sitting, BP Cuff Size: Adult)   Pulse 60   Resp 16   Ht 1.651 m (5' 5\")   Wt (!) 159 kg (351 lb)   LMP 04/01/2019 (Approximate)   SpO2 91%   BMI 58.41 kg/m²   Appearance: Well-nourished, well-developed, no acute distress  Eyes:  No scleral icterus , EOMI  Musculoskeletal:  Grossly normal; gait and station normal; digits and nails normal  Skin:  No rashes, petechiae, cyanosis  Neurologic: without focal signs; oriented to person, time, place, and purpose; judgement intact      Medical Decision Making   Assessment and Plan  An Rainey is a 58 y.o.female  with hypertension, depression, anxiety, obesity, history of atrial fibrillation, and obstructive sleep apnea on CPAP.  Presents today to follow-up regarding management of obstructive sleep apnea.    The medical record was reviewed.    Obstructive sleep apnea  Compliance data reviewed showing 33% usage > 4hours in last 30  days. Average AHI 0.6 events/hour.     Current Settings Auto CPAP 5-15    Trouble shooting with patient regarding pressure and settings. Advised to continue to use CPAP. Will lower pressure max to 11. Increased humidity to 6.     PLAN:   -Order placed for mask and supplies  -Change settings to Auto CPAP 4-11   -Advised to reach out via MyChart with questions     Has been advised to continue the current CPAP , clean equipment frequently, and get new mask and supplies as allowed by insurance and DME. Recommend an earlier appointment, if significant treatment barriers develop.    Patients with EVERARDO are at increased risk of cardiovascular disease including coronary artery " disease, systemic arterial hypertension, pulmonary arterial hypertension, cardiac arrythmias, and stroke. The patient was advised to avoid driving a motor vehicle when drowsy.    Positive airway pressure will favorably impact many of the adverse conditions and effects provoked by EVERARDO.    Have advised the patient to follow up with the appropriate healthcare practitioners for all other medical problems and issues.    Return in about 6 weeks (around 5/17/2024).      Please note portions of this record was created using voice recognition software. I have made every reasonable attempt to correct obvious errors, but I expect that there are errors of grammar and possibly content I did not discover before finalizing the note.

## 2024-04-15 ENCOUNTER — OFFICE VISIT (OUTPATIENT)
Dept: MEDICAL GROUP | Facility: MEDICAL CENTER | Age: 59
End: 2024-04-15
Payer: COMMERCIAL

## 2024-04-15 VITALS
DIASTOLIC BLOOD PRESSURE: 58 MMHG | SYSTOLIC BLOOD PRESSURE: 126 MMHG | WEIGHT: 293 LBS | TEMPERATURE: 96.5 F | BODY MASS INDEX: 48.82 KG/M2 | RESPIRATION RATE: 18 BRPM | OXYGEN SATURATION: 92 % | HEART RATE: 72 BPM | HEIGHT: 65 IN

## 2024-04-15 DIAGNOSIS — I89.0 LYMPHEDEMA: ICD-10-CM

## 2024-04-15 DIAGNOSIS — R11.0 NAUSEA: ICD-10-CM

## 2024-04-15 DIAGNOSIS — N84.0 ENDOMETRIAL POLYP: ICD-10-CM

## 2024-04-15 PROCEDURE — 3074F SYST BP LT 130 MM HG: CPT | Performed by: PHYSICIAN ASSISTANT

## 2024-04-15 PROCEDURE — 3078F DIAST BP <80 MM HG: CPT | Performed by: PHYSICIAN ASSISTANT

## 2024-04-15 PROCEDURE — 99214 OFFICE O/P EST MOD 30 MIN: CPT | Performed by: PHYSICIAN ASSISTANT

## 2024-04-15 RX ORDER — AZITHROMYCIN 250 MG/1
TABLET, FILM COATED ORAL
Qty: 6 TABLET | Refills: 0 | Status: SHIPPED | OUTPATIENT
Start: 2024-04-15

## 2024-04-15 RX ORDER — ONDANSETRON 4 MG/1
4 TABLET, ORALLY DISINTEGRATING ORAL EVERY 6 HOURS PRN
Qty: 10 TABLET | Refills: 0 | Status: SHIPPED | OUTPATIENT
Start: 2024-04-15

## 2024-04-15 ASSESSMENT — FIBROSIS 4 INDEX: FIB4 SCORE: 0.99

## 2024-04-15 NOTE — ASSESSMENT & PLAN NOTE
"Chronic condition that is unstable  11/2/2023 \"patient saw OB/GYN and had endometrial biopsy done with sample taken that showed benign endometrial polyp.  Has not had any bleeding.  We can repeat CBC and if in high normal range can stop iron sulfate and she will just take daily multivitamin  "

## 2024-04-15 NOTE — PROGRESS NOTES
"Subjective:   An Rainey is a 58 y.o. female here today for     HPI: Patient is here to discuss:  Problem   Endometrial Polyp    11/2/2023 \"patient saw OB/GYN and had endometrial biopsy done with sample taken that showed benign endometrial polyp  April 5, 2024: No bleeding.  Has been on iron but would like to stop     Lymphedema    11/7/2023: Saw lymphedema clinic twice.  Has area wrapped with compression wrappings.  Has had no weeping or oozing or return of cellulitis            Current medicines (including changes today)  Current Outpatient Medications   Medication Sig Dispense Refill    azithromycin (ZITHROMAX) 250 MG Tab Take two tablets on day one, then on tablet the following four days 6 Tablet 0    ondansetron (ZOFRAN ODT) 4 MG TABLET DISPERSIBLE Take 1 Tablet by mouth every 6 hours as needed for Nausea/Vomiting. 10 Tablet 0    albuterol 108 (90 Base) MCG/ACT Aero Soln inhalation aerosol INHALE 2 PUFFS BY MOUTH EVERY 6 HOURS AS NEEDED FOR SHORTNESS OF BREATH 8.5 Each 2    metoprolol tartrate (LOPRESSOR) 25 MG Tab TAKE 1 TABLET BY MOUTH 1 TIME A DAY AS NEEDED (FOR PALPITATIONS). 90 Tablet 3    amLODIPine (NORVASC) 10 MG Tab TAKE 1 TABLET BY MOUTH ONCE DAILY FOR 90 DAYS 90 Tablet 2    sertraline (ZOLOFT) 25 MG tablet TAKE 1 TABLET BY MOUTH EVERY DAY 90 Tablet 3    ferrous sulfate 325 (65 Fe) MG tablet TAKE 1 TABLET BY MOUTH EVERY 48 HOURS FOR 90 DAYS.      ascorbic acid (ASCORBIC ACID) 500 MG Tab TAKE 1 TABLET BY MOUTH EVERY 48 HOURS. TAKE WITH IRON SUPPLEMENT. THIS IS ALSO OVER THE COUNTER      lisinopril (PRINIVIL) 20 MG Tab TAKE 1 TABLET BY MOUTH EVERY DAY 90 Tablet 3    Multiple Vitamin (MULTIVITAMIN PO) Take 1 Tablet by mouth every morning.      Omega-3 Fatty Acids (FISH OIL PO) Take 1 Capsule by mouth every morning.      B Complex Vitamins (VITAMIN B COMPLEX) Tab Take 1 Tablet by mouth every morning.      BIOTIN PO Take 1 Tablet by mouth every morning.      CALCIUM PO Take 1 Tablet by mouth " "every morning.      apixaban (ELIQUIS) 5mg Tab Take 1 Tablet by mouth 2 times a day. 180 Tablet 3    flecainide (TAMBOCOR) 50 MG tablet Take 2 Tablets by mouth 2 times a day. 360 Tablet 2    VITAMIN D PO Take 1 Tablet by mouth every morning.      fluticasone (FLONASE) 50 MCG/ACT nasal spray Administer 1 Spray into affected nostril(S) every day.       No current facility-administered medications for this visit.     She  has a past medical history of Allergy, Arrhythmia (11/11/2022), Breath shortness (11/11/2022), GERD (gastroesophageal reflux disease), History of anemia, HTN (hypertension) (04/27/2019), Hypertension, Lymph edema, Seasonal allergies, Sleep apnea, and Snoring.    She has no past medical history of Asthma, Congestive heart failure (HCC), Liver disease, Stroke (HCC), or Type II or unspecified type diabetes mellitus without mention of complication, not stated as uncontrolled.  Patient has no known allergies.     Social History and Family History were reviewed and updated.    ROS   No headaches, chest pain, no shortness of breath, abdominal pain, nausea, or vomiting.  All other systems were reviewed and are negative or noted as positive in the HPI.       Objective:     /58   Pulse 72   Temp 35.8 °C (96.5 °F)   Resp 18   Ht 1.651 m (5' 5\")   Wt (!) 159 kg (351 lb)   SpO2 92%  Body mass index is 58.41 kg/m².     Physical Exam:  General: Patient appears well-nourished, well-hydrated, nontoxic  HEENT, normocephalic atraumatic, PERRLA, extraocular movements intact, nares are patent and clear  Neck: No visible masses, thyromegaly or abnormalities noted  Cardiovascular.  Sitting comfortably without visible signs of edema  Lungs: No cyanosis noted, nondyspneic  Skin: Well perfused without evidence of rash or lesions  Neurological: Cranial nerves II through XII intact, normal gait  Musculoskeletal: Normal range of motion, normal strength and no deficit noted     Clinical Course/Lab " "Analysis:        Assessment and Plan:   The following treatment plan was discussed.  Signs and symptoms for which to return were discussed with patient at length.  Patient verbalized understanding.    Problem List Items Addressed This Visit       Lymphedema     Chronic and unstable  4/15/24: Saw lymphedema clinic twice.  HaD area wrapped with compression wrappings. Does not need wraps currently         Endometrial polyp     Chronic condition that is unstable  11/2/2023 \"patient saw OB/GYN and had endometrial biopsy done with sample taken that showed benign endometrial polyp.  Has not had any bleeding.  We can repeat CBC and if in high normal range can stop iron sulfate and she will just take daily multivitamin         Relevant Orders    CBC WITH DIFFERENTIAL     Other Visit Diagnoses       Nausea        Relevant Medications    azithromycin (ZITHROMAX) 250 MG Tab    ondansetron (ZOFRAN ODT) 4 MG TABLET DISPERSIBLE    Other Relevant Orders    Comp Metabolic Panel    POCT CEPHEID COV-2, FLU A/B, RSV - PCR          An will bring in forms next visit.  Some helpful things that me improve workflow productivity would be to consider 1 to 215-minute additional breaks per day to refocus.  This is secondary to fatigue from severe obstructive sleep apnea.  She is using CPAP and trying to get her dials tuned in.  Also she has significant medical comorbidities that do require appointments with me and specialty care she may need 1 additional day per month for scheduling appointments and going to appointments so that she does not get deducted at work    Followup: 2 weeks forms    Please note that this dictation was created using voice recognition software. I have made every reasonable attempt to correct obvious errors, but I expect that there are errors of grammar and possibly content that I did not discover before finalizing the note.             "

## 2024-04-15 NOTE — ASSESSMENT & PLAN NOTE
Chronic and unstable  4/15/24: Saw lymphedema clinic twice.  HaD area wrapped with compression wrappings. Does not need wraps currently

## 2024-04-18 ENCOUNTER — OFFICE VISIT (OUTPATIENT)
Dept: MEDICAL GROUP | Facility: MEDICAL CENTER | Age: 59
End: 2024-04-18
Payer: COMMERCIAL

## 2024-04-18 VITALS
WEIGHT: 293 LBS | HEIGHT: 65 IN | HEART RATE: 59 BPM | BODY MASS INDEX: 48.82 KG/M2 | TEMPERATURE: 97 F | OXYGEN SATURATION: 94 % | RESPIRATION RATE: 16 BRPM

## 2024-04-18 DIAGNOSIS — I89.0 LYMPHEDEMA: ICD-10-CM

## 2024-04-18 DIAGNOSIS — Z02.89 ENCOUNTER FOR COMPLETION OF FORM WITH PATIENT: ICD-10-CM

## 2024-04-18 DIAGNOSIS — I48.0 PAROXYSMAL ATRIAL FIBRILLATION (HCC): ICD-10-CM

## 2024-04-18 DIAGNOSIS — N84.0 ENDOMETRIAL POLYP: ICD-10-CM

## 2024-04-18 DIAGNOSIS — G47.33 OSA (OBSTRUCTIVE SLEEP APNEA): ICD-10-CM

## 2024-04-18 DIAGNOSIS — E66.01 MORBID OBESITY (HCC): ICD-10-CM

## 2024-04-18 DIAGNOSIS — I10 PRIMARY HYPERTENSION: ICD-10-CM

## 2024-04-18 PROCEDURE — 99214 OFFICE O/P EST MOD 30 MIN: CPT | Performed by: PHYSICIAN ASSISTANT

## 2024-04-18 ASSESSMENT — FIBROSIS 4 INDEX: FIB4 SCORE: 0.99

## 2024-04-18 NOTE — PROGRESS NOTES
Subjective:   An Rainey is a 58 y.o. female here today for   Chief Complaint   Patient presents with    Follow-Up    Paperwork       HPI: Patient is here to discuss:    Form completion for work restriction.  Feels that she needs some forms filled out so that she can take appointment and needs a better chair given history of lymphedema and morbid obesity.      No Known Allergies   Current Outpatient Medications   Medication Sig    azithromycin (ZITHROMAX) 250 MG Tab Take two tablets on day one, then on tablet the following four days    ondansetron (ZOFRAN ODT) 4 MG TABLET DISPERSIBLE Take 1 Tablet by mouth every 6 hours as needed for Nausea/Vomiting.    albuterol 108 (90 Base) MCG/ACT Aero Soln inhalation aerosol INHALE 2 PUFFS BY MOUTH EVERY 6 HOURS AS NEEDED FOR SHORTNESS OF BREATH    metoprolol tartrate (LOPRESSOR) 25 MG Tab TAKE 1 TABLET BY MOUTH 1 TIME A DAY AS NEEDED (FOR PALPITATIONS).    amLODIPine (NORVASC) 10 MG Tab TAKE 1 TABLET BY MOUTH ONCE DAILY FOR 90 DAYS    sertraline (ZOLOFT) 25 MG tablet TAKE 1 TABLET BY MOUTH EVERY DAY    ferrous sulfate 325 (65 Fe) MG tablet TAKE 1 TABLET BY MOUTH EVERY 48 HOURS FOR 90 DAYS.    ascorbic acid (ASCORBIC ACID) 500 MG Tab TAKE 1 TABLET BY MOUTH EVERY 48 HOURS. TAKE WITH IRON SUPPLEMENT. THIS IS ALSO OVER THE COUNTER    lisinopril (PRINIVIL) 20 MG Tab TAKE 1 TABLET BY MOUTH EVERY DAY    Multiple Vitamin (MULTIVITAMIN PO) Take 1 Tablet by mouth every morning.    Omega-3 Fatty Acids (FISH OIL PO) Take 1 Capsule by mouth every morning.    B Complex Vitamins (VITAMIN B COMPLEX) Tab Take 1 Tablet by mouth every morning.    BIOTIN PO Take 1 Tablet by mouth every morning.    CALCIUM PO Take 1 Tablet by mouth every morning.    apixaban (ELIQUIS) 5mg Tab Take 1 Tablet by mouth 2 times a day.    flecainide (TAMBOCOR) 50 MG tablet Take 2 Tablets by mouth 2 times a day.    VITAMIN D PO Take 1 Tablet by mouth every morning.    fluticasone (FLONASE) 50 MCG/ACT nasal  "spray Administer 1 Spray into affected nostril(S) every day.     Family History   Problem Relation Age of Onset    Cancer Mother     Cancer Father     Cancer Sister     Cancer Maternal Grandmother          ROS   No headaches, chest pain, no shortness of breath, abdominal pain, nausea, or vomiting.  All other systems were reviewed and are negative or noted as positive in the HPI.       Objective:     Pulse (!) 59   Temp 36.1 °C (97 °F) (Temporal)   Resp 16   Ht 1.651 m (5' 5\")   Wt (!) 158 kg (349 lb 6.4 oz)   LMP 04/01/2019 (Approximate)   SpO2 94%   BMI 58.14 kg/m²     Physical Exam:  General: Patient appears well-nourished, well-hydrated, nontoxic  HEENT, normocephalic atraumatic, PERRLA, extraocular movements intact, nares are patent and clear  Neck: No visible masses, thyromegaly or abnormalities noted  Cardiovascular.  Sitting comfortably without visible signs of edema  Lungs: No cyanosis noted, nondyspneic  Skin: Well perfused without evidence of rash or lesions  Neurological: Cranial nerves II through XII intact, normal gait  Musculoskeletal: Normal range of motion, normal strength and no deficit noted     Clinical Course/Lab Analysis:        Assessment and Plan:   The following treatment plan was discussed.  Signs and symptoms for which to return were discussed with patient at length.  Patient verbalized understanding.    1. Encounter for completion of form with patient    2. Paroxysmal atrial fibrillation (HCC)    3. Endometrial polyp    4. Primary hypertension    5. Lymphedema    6. Morbid obesity (HCC)    7. EVERARDO (obstructive sleep apnea)      These are all chronic conditions: Please see scanned section for form completion.  I did fill out with her together her expected needs.  Given the above she is requesting approximately 1-2 full days off of work per month so she can attend her appointment.  She is also requesting a additional 15-minute break to include an refill of this.  Lastly she would like " a chair for lumbar support.  I did address things in the comments section and put that the restrictions are episodic.  Please follow-up as scheduled    Anticipatory guidance discussed at length including regular daily exercise with a goal of 150 minutes a week.  Recommendation to eat the Mediterranean diet to decrease cardiovascular risk.  Encouraged avoiding high fructose corn syrup and other simple sugars to reduce risk of obesity and type 2 diabetes.    Follow Up: 12 weeks    Please note that this dictation was created using voice recognition software. I have made every reasonable attempt to correct obvious errors, but I expect that there are errors of grammar and possibly content that I did not discover before finalizing the note.

## 2024-05-07 ENCOUNTER — APPOINTMENT (OUTPATIENT)
Dept: MEDICAL GROUP | Facility: MEDICAL CENTER | Age: 59
End: 2024-05-07
Payer: COMMERCIAL

## 2024-05-15 DIAGNOSIS — I48.19 PERSISTENT ATRIAL FIBRILLATION (HCC): ICD-10-CM

## 2024-05-15 RX ORDER — FLECAINIDE ACETATE 100 MG/1
100 TABLET ORAL 2 TIMES DAILY
Qty: 180 TABLET | Refills: 0 | Status: SHIPPED | OUTPATIENT
Start: 2024-05-15

## 2024-05-17 ENCOUNTER — APPOINTMENT (OUTPATIENT)
Dept: SLEEP MEDICINE | Facility: MEDICAL CENTER | Age: 59
End: 2024-05-17
Attending: PHYSICIAN ASSISTANT
Payer: COMMERCIAL

## 2024-06-06 ENCOUNTER — OFFICE VISIT (OUTPATIENT)
Dept: URGENT CARE | Facility: CLINIC | Age: 59
End: 2024-06-06
Payer: COMMERCIAL

## 2024-06-06 ENCOUNTER — HOSPITAL ENCOUNTER (EMERGENCY)
Facility: MEDICAL CENTER | Age: 59
End: 2024-06-06
Attending: EMERGENCY MEDICINE
Payer: COMMERCIAL

## 2024-06-06 ENCOUNTER — APPOINTMENT (OUTPATIENT)
Dept: RADIOLOGY | Facility: IMAGING CENTER | Age: 59
End: 2024-06-06
Attending: PHYSICIAN ASSISTANT
Payer: COMMERCIAL

## 2024-06-06 ENCOUNTER — APPOINTMENT (OUTPATIENT)
Dept: RADIOLOGY | Facility: MEDICAL CENTER | Age: 59
End: 2024-06-06
Payer: COMMERCIAL

## 2024-06-06 ENCOUNTER — APPOINTMENT (OUTPATIENT)
Dept: RADIOLOGY | Facility: MEDICAL CENTER | Age: 59
End: 2024-06-06
Attending: EMERGENCY MEDICINE
Payer: COMMERCIAL

## 2024-06-06 VITALS
HEART RATE: 60 BPM | WEIGHT: 293 LBS | TEMPERATURE: 97.2 F | BODY MASS INDEX: 48.82 KG/M2 | SYSTOLIC BLOOD PRESSURE: 116 MMHG | OXYGEN SATURATION: 95 % | DIASTOLIC BLOOD PRESSURE: 60 MMHG | RESPIRATION RATE: 16 BRPM | HEIGHT: 65 IN

## 2024-06-06 VITALS
WEIGHT: 293 LBS | OXYGEN SATURATION: 93 % | DIASTOLIC BLOOD PRESSURE: 62 MMHG | BODY MASS INDEX: 48.82 KG/M2 | TEMPERATURE: 98 F | HEIGHT: 65 IN | RESPIRATION RATE: 14 BRPM | SYSTOLIC BLOOD PRESSURE: 129 MMHG | HEART RATE: 48 BPM

## 2024-06-06 DIAGNOSIS — R07.89 CHEST WALL PAIN: ICD-10-CM

## 2024-06-06 DIAGNOSIS — R06.02 SHORTNESS OF BREATH: ICD-10-CM

## 2024-06-06 DIAGNOSIS — M54.6 LEFT-SIDED THORACIC BACK PAIN, UNSPECIFIED CHRONICITY: ICD-10-CM

## 2024-06-06 DIAGNOSIS — R07.89 CHEST PRESSURE: ICD-10-CM

## 2024-06-06 DIAGNOSIS — R05.1 ACUTE COUGH: ICD-10-CM

## 2024-06-06 LAB
ALBUMIN SERPL BCP-MCNC: 4.3 G/DL (ref 3.2–4.9)
ALBUMIN/GLOB SERPL: 1.3 G/DL
ALP SERPL-CCNC: 86 U/L (ref 30–99)
ALT SERPL-CCNC: 16 U/L (ref 2–50)
ANION GAP SERPL CALC-SCNC: 14 MMOL/L (ref 7–16)
AST SERPL-CCNC: 18 U/L (ref 12–45)
BASOPHILS # BLD AUTO: 0.8 % (ref 0–1.8)
BASOPHILS # BLD: 0.05 K/UL (ref 0–0.12)
BILIRUB SERPL-MCNC: 0.3 MG/DL (ref 0.1–1.5)
BUN SERPL-MCNC: 10 MG/DL (ref 8–22)
CALCIUM ALBUM COR SERPL-MCNC: 9.2 MG/DL (ref 8.5–10.5)
CALCIUM SERPL-MCNC: 9.4 MG/DL (ref 8.4–10.2)
CHLORIDE SERPL-SCNC: 101 MMOL/L (ref 96–112)
CO2 SERPL-SCNC: 25 MMOL/L (ref 20–33)
CREAT SERPL-MCNC: 0.6 MG/DL (ref 0.5–1.4)
D DIMER PPP IA.FEU-MCNC: 0.68 UG/ML (FEU) (ref 0–0.5)
EKG 4674: NORMAL
EKG IMPRESSION: NORMAL
EOSINOPHIL # BLD AUTO: 0.2 K/UL (ref 0–0.51)
EOSINOPHIL NFR BLD: 3.3 % (ref 0–6.9)
ERYTHROCYTE [DISTWIDTH] IN BLOOD BY AUTOMATED COUNT: 49.4 FL (ref 35.9–50)
FLUAV RNA SPEC QL NAA+PROBE: NEGATIVE
FLUBV RNA SPEC QL NAA+PROBE: NEGATIVE
GFR SERPLBLD CREATININE-BSD FMLA CKD-EPI: 105 ML/MIN/1.73 M 2
GLOBULIN SER CALC-MCNC: 3.3 G/DL (ref 1.9–3.5)
GLUCOSE SERPL-MCNC: 96 MG/DL (ref 65–99)
HCT VFR BLD AUTO: 40.5 % (ref 37–47)
HGB BLD-MCNC: 12.8 G/DL (ref 12–16)
IMM GRANULOCYTES # BLD AUTO: 0.02 K/UL (ref 0–0.11)
IMM GRANULOCYTES NFR BLD AUTO: 0.3 % (ref 0–0.9)
LYMPHOCYTES # BLD AUTO: 1.99 K/UL (ref 1–4.8)
LYMPHOCYTES NFR BLD: 32.9 % (ref 22–41)
MCH RBC QN AUTO: 28.2 PG (ref 27–33)
MCHC RBC AUTO-ENTMCNC: 31.6 G/DL (ref 32.2–35.5)
MCV RBC AUTO: 89.2 FL (ref 81.4–97.8)
MONOCYTES # BLD AUTO: 0.41 K/UL (ref 0–0.85)
MONOCYTES NFR BLD AUTO: 6.8 % (ref 0–13.4)
NEUTROPHILS # BLD AUTO: 3.38 K/UL (ref 1.82–7.42)
NEUTROPHILS NFR BLD: 55.9 % (ref 44–72)
NRBC # BLD AUTO: 0 K/UL
NRBC BLD-RTO: 0 /100 WBC (ref 0–0.2)
PLATELET # BLD AUTO: 289 K/UL (ref 164–446)
PMV BLD AUTO: 10.4 FL (ref 9–12.9)
POTASSIUM SERPL-SCNC: 4.3 MMOL/L (ref 3.6–5.5)
PROT SERPL-MCNC: 7.6 G/DL (ref 6–8.2)
RBC # BLD AUTO: 4.54 M/UL (ref 4.2–5.4)
RSV RNA SPEC QL NAA+PROBE: NEGATIVE
SARS-COV-2 RNA RESP QL NAA+PROBE: NEGATIVE
SODIUM SERPL-SCNC: 140 MMOL/L (ref 135–145)
TROPONIN T SERPL-MCNC: <6 NG/L (ref 6–19)
WBC # BLD AUTO: 6.1 K/UL (ref 4.8–10.8)

## 2024-06-06 PROCEDURE — 36415 COLL VENOUS BLD VENIPUNCTURE: CPT

## 2024-06-06 PROCEDURE — 71275 CT ANGIOGRAPHY CHEST: CPT

## 2024-06-06 PROCEDURE — 84484 ASSAY OF TROPONIN QUANT: CPT

## 2024-06-06 PROCEDURE — 99215 OFFICE O/P EST HI 40 MIN: CPT | Performed by: PHYSICIAN ASSISTANT

## 2024-06-06 PROCEDURE — 93005 ELECTROCARDIOGRAM TRACING: CPT

## 2024-06-06 PROCEDURE — 71046 X-RAY EXAM CHEST 2 VIEWS: CPT | Mod: TC,FY | Performed by: PHYSICIAN ASSISTANT

## 2024-06-06 PROCEDURE — 0241U POCT CEPHEID COV-2, FLU A/B, RSV - PCR: CPT | Performed by: PHYSICIAN ASSISTANT

## 2024-06-06 PROCEDURE — 3074F SYST BP LT 130 MM HG: CPT | Performed by: PHYSICIAN ASSISTANT

## 2024-06-06 PROCEDURE — 85025 COMPLETE CBC W/AUTO DIFF WBC: CPT

## 2024-06-06 PROCEDURE — 99284 EMERGENCY DEPT VISIT MOD MDM: CPT

## 2024-06-06 PROCEDURE — 85379 FIBRIN DEGRADATION QUANT: CPT

## 2024-06-06 PROCEDURE — 700117 HCHG RX CONTRAST REV CODE 255: Performed by: EMERGENCY MEDICINE

## 2024-06-06 PROCEDURE — 3078F DIAST BP <80 MM HG: CPT | Performed by: PHYSICIAN ASSISTANT

## 2024-06-06 PROCEDURE — 93005 ELECTROCARDIOGRAM TRACING: CPT | Performed by: EMERGENCY MEDICINE

## 2024-06-06 PROCEDURE — 80053 COMPREHEN METABOLIC PANEL: CPT

## 2024-06-06 RX ORDER — TRAMADOL HYDROCHLORIDE 50 MG/1
50-100 TABLET ORAL EVERY 6 HOURS PRN
Qty: 20 TABLET | Refills: 0 | Status: SHIPPED | OUTPATIENT
Start: 2024-06-06 | End: 2024-06-11

## 2024-06-06 RX ADMIN — IOHEXOL 75 ML: 350 INJECTION, SOLUTION INTRAVENOUS at 12:09

## 2024-06-06 ASSESSMENT — ENCOUNTER SYMPTOMS
WHEEZING: 1
SHORTNESS OF BREATH: 1
NAUSEA: 0
EYE REDNESS: 0
MYALGIAS: 0
VOMITING: 0
COUGH: 1
HEADACHES: 1
DIARRHEA: 0
SORE THROAT: 0
EYE DISCHARGE: 0
FEVER: 0

## 2024-06-06 ASSESSMENT — FIBROSIS 4 INDEX
FIB4 SCORE: 0.99
FIB4 SCORE: 0.99

## 2024-06-06 ASSESSMENT — HEART SCORE
TROPONIN: LESS THAN OR EQUAL TO NORMAL LIMIT
AGE: 45-64
ECG: NORMAL
RISK FACTORS: 1-2 RISK FACTORS
HEART SCORE: 2
HISTORY: SLIGHTLY SUSPICIOUS

## 2024-06-06 ASSESSMENT — PAIN DESCRIPTION - DESCRIPTORS: DESCRIPTORS: ACHING

## 2024-06-06 NOTE — Clinical Note
An Rainey was seen and treated in our emergency department on 6/6/2024.  She may return to work on 06/10/2024.       If you have any questions or concerns, please don't hesitate to call.      Yvon Shah M.D.

## 2024-06-06 NOTE — ED TRIAGE NOTES
Chief Complaint   Patient presents with    Sent from Urgent Care    Chest Pain    Shortness of Breath      Pt complains of CP, radiating to back, and SOB. Sent by  for abnormal EKG.

## 2024-06-06 NOTE — PROGRESS NOTES
Subjective     An Rainey is a 58 y.o. female who presents with Cough (X 4 days, SOB, tired, wet cough, constant chest pain.)            Cough  This is a new problem. Episode onset: x 4 days ago. The problem has been unchanged. The cough is Productive of sputum. Associated symptoms include chest pain (The patient reports an associated substernal chest pressure, which has been constant. The patient reports radiation of pain to her neck.), ear congestion, headaches, nasal congestion, shortness of breath (Patient reports associated shortness of breath, stating she is unable to take a deep breath.) and wheezing (The patient reports intermittent wheezing.). Pertinent negatives include no ear pain, eye redness, fever, myalgias or sore throat. She has tried a beta-agonist inhaler (and OTC Naproxen; OTC Tylenol) for the symptoms.     PMH:  has a past medical history of Allergy, Arrhythmia (11/11/2022), Breath shortness (11/11/2022), GERD (gastroesophageal reflux disease), History of anemia, HTN (hypertension) (04/27/2019), Hypertension, Lymph edema, Seasonal allergies, Sleep apnea, and Snoring.    She has no past medical history of Asthma, Congestive heart failure (HCC), Liver disease, Stroke (HCC), or Type II or unspecified type diabetes mellitus without mention of complication, not stated as uncontrolled.  MEDS:   Current Outpatient Medications:     flecainide (TAMBOCOR) 100 MG Tab, Take 1 Tablet by mouth 2 times a day., Disp: 180 Tablet, Rfl: 0    azithromycin (ZITHROMAX) 250 MG Tab, Take two tablets on day one, then on tablet the following four days, Disp: 6 Tablet, Rfl: 0    ondansetron (ZOFRAN ODT) 4 MG TABLET DISPERSIBLE, Take 1 Tablet by mouth every 6 hours as needed for Nausea/Vomiting., Disp: 10 Tablet, Rfl: 0    albuterol 108 (90 Base) MCG/ACT Aero Soln inhalation aerosol, INHALE 2 PUFFS BY MOUTH EVERY 6 HOURS AS NEEDED FOR SHORTNESS OF BREATH, Disp: 8.5 Each, Rfl: 2    metoprolol tartrate (LOPRESSOR)  25 MG Tab, TAKE 1 TABLET BY MOUTH 1 TIME A DAY AS NEEDED (FOR PALPITATIONS)., Disp: 90 Tablet, Rfl: 3    amLODIPine (NORVASC) 10 MG Tab, TAKE 1 TABLET BY MOUTH ONCE DAILY FOR 90 DAYS, Disp: 90 Tablet, Rfl: 2    sertraline (ZOLOFT) 25 MG tablet, TAKE 1 TABLET BY MOUTH EVERY DAY, Disp: 90 Tablet, Rfl: 3    ascorbic acid (ASCORBIC ACID) 500 MG Tab, TAKE 1 TABLET BY MOUTH EVERY 48 HOURS. TAKE WITH IRON SUPPLEMENT. THIS IS ALSO OVER THE COUNTER, Disp: , Rfl:     lisinopril (PRINIVIL) 20 MG Tab, TAKE 1 TABLET BY MOUTH EVERY DAY, Disp: 90 Tablet, Rfl: 3    Multiple Vitamin (MULTIVITAMIN PO), Take 1 Tablet by mouth every morning., Disp: , Rfl:     Omega-3 Fatty Acids (FISH OIL PO), Take 1 Capsule by mouth every morning., Disp: , Rfl:     B Complex Vitamins (VITAMIN B COMPLEX) Tab, Take 1 Tablet by mouth every morning., Disp: , Rfl:     BIOTIN PO, Take 1 Tablet by mouth every morning., Disp: , Rfl:     CALCIUM PO, Take 1 Tablet by mouth every morning., Disp: , Rfl:     apixaban (ELIQUIS) 5mg Tab, Take 1 Tablet by mouth 2 times a day., Disp: 180 Tablet, Rfl: 3    VITAMIN D PO, Take 1 Tablet by mouth every morning., Disp: , Rfl:     fluticasone (FLONASE) 50 MCG/ACT nasal spray, Administer 1 Spray into affected nostril(S) every day., Disp: , Rfl:     ferrous sulfate 325 (65 Fe) MG tablet, TAKE 1 TABLET BY MOUTH EVERY 48 HOURS FOR 90 DAYS. (Patient not taking: Reported on 6/6/2024), Disp: , Rfl:   ALLERGIES: No Known Allergies  SURGHX:   Past Surgical History:   Procedure Laterality Date    HYSTEROSCOPY WITH MYOSURE N/A 12/8/2023    Procedure: HYSTEROSCOPY DILATION AND CURETTAGE POLYPECTOMY;  Surgeon: Leola Cole D.O.;  Location: SURGERY SAME DAY [facility];  Service: Obstetrics    DILATION AND CURETTAGE N/A 12/8/2023    Procedure: DILATION AND CURETTAGE;  Surgeon: Leola Cole D.O.;  Location: SURGERY SAME DAY [facility];  Service: Obstetrics     SOCHX:  reports that she has never smoked. She has never used smokeless  "tobacco. She reports current alcohol use. She reports that she does not use drugs.  FH: Family history was reviewed, no pertinent findings to report    Review of Systems   Constitutional:  Negative for fever.   HENT:  Positive for congestion. Negative for ear pain and sore throat.    Eyes:  Negative for discharge and redness.   Respiratory:  Positive for cough, shortness of breath (Patient reports associated shortness of breath, stating she is unable to take a deep breath.) and wheezing (The patient reports intermittent wheezing.).    Cardiovascular:  Positive for chest pain (The patient reports an associated substernal chest pressure, which has been constant. The patient reports radiation of pain to her neck.) and leg swelling (The patient reports chronic swelling to her bilateral lower extremities secondary to lymphedema.).   Gastrointestinal:  Negative for diarrhea, nausea and vomiting.   Musculoskeletal:  Negative for myalgias.   Neurological:  Positive for headaches.              Objective     /60   Pulse 60   Temp 36.2 °C (97.2 °F) (Temporal)   Resp 16   Ht 1.651 m (5' 5\")   Wt (!) 163 kg (360 lb)   LMP 04/01/2019 (Approximate)   SpO2 95%   BMI 59.91 kg/m²      Physical Exam  Constitutional:       General: She is not in acute distress.     Appearance: Normal appearance. She is not ill-appearing.   HENT:      Head: Normocephalic and atraumatic.      Right Ear: Tympanic membrane, ear canal and external ear normal.      Left Ear: Tympanic membrane, ear canal and external ear normal.      Nose: Nose normal.      Mouth/Throat:      Mouth: Mucous membranes are moist.      Pharynx: Oropharynx is clear. No posterior oropharyngeal erythema.   Eyes:      Extraocular Movements: Extraocular movements intact.      Conjunctiva/sclera: Conjunctivae normal.   Cardiovascular:      Rate and Rhythm: Normal rate and regular rhythm.      Heart sounds: Normal heart sounds.   Pulmonary:      Effort: Pulmonary effort is " normal. No respiratory distress.      Breath sounds: Normal breath sounds. No wheezing.   Musculoskeletal:         General: Normal range of motion.      Cervical back: Normal range of motion and neck supple.      Right lower leg: Edema present.      Left lower leg: Edema present.   Skin:     General: Skin is warm and dry.   Neurological:      Mental Status: She is alert and oriented to person, place, and time.               Progress:    CXR:  COMPARISON:  9/1/2023     FINDINGS:  Cardiomediastinal silhouette is normal. Aortic calcified atherosclerotic plaque.     No focal consolidation, pleural effusion, pulmonary edema or pneumothorax.     No acute osseous abnormality.     IMPRESSION:  No acute cardiopulmonary abnormality.    EKG:  EKG Interpretation   Interpreted by me   Rhythm: normal sinus   Rate: bradycardia    Axis: normal   Ectopy: none   Conduction: normal   ST Segments: no acute change   T Waves: inverted T waves in V1-V3  Q Waves: none   Clinical Impression: abnormal EKG  This is changed from the patient's previous EKG on 9/1/2023      Results for orders placed or performed in visit on 06/06/24   POCT CoV-2, Flu A/B, RSV by PCR   Result Value Ref Range    SARS-CoV-2 by PCR Negative Negative, Invalid    Influenza virus A RNA Negative Negative, Invalid    Influenza virus B, PCR Negative Negative, Invalid    RSV, PCR Negative Negative, Invalid       Spoke with the transfer center to inform the ED of the patient.           Assessment & Plan          1. Chest pressure  - DX-CHEST-2 VIEWS; Future  - EKG    2. Shortness of breath  - POCT CoV-2, Flu A/B, RSV by PCR  - DX-CHEST-2 VIEWS; Future  - EKG    3. Acute cough  - POCT CoV-2, Flu A/B, RSV by PCR    The patient's presenting symptoms and physical exam findings are consistent with chest pressure with associated shortness of breath.  The patient is also experiencing a wet cough.  On physical exam, the patient's heart was regular rate and rhythm without murmurs,  gallops, or rubs.  The patient's lungs were, auscultation without wheezing, and her pulse ox was within normal limits.  The patient had edema to her bilateral lower extremities, which she states is chronic secondary to lymphedema.  However, the patient notes some increased swelling to her legs over the past week.  The patient is nontoxic and appears in no acute distress.  The patient's vital signs are stable and within normal limits.  She is afebrile today in clinic.  A chest x-ray and EKG were obtained to further evaluate the patient's current symptoms.  The patient's chest x-ray showed no acute cardiopulmonary abnormality. The patient's EKG today in clinic showed sinus rhythm with a heart rate of 53.  The patient had inverted T waves in V1, V2, and V3.  No acute ST segment changes were appreciated.  This is changed from the patient's previous EKG on 9/1/2023.  Additionally, the patient's POCT Cepheid viral testing was negative for COVID-19, influenza, and RSV.  The cause of the patient's current symptoms is unknown at this time.  Based on the patient's presenting symptoms, physical exam findings, and EKG results, I do believe the patient would benefit from further evaluation.  Therefore, I recommend the patient be transferred to the [facility] ED for further evaluation management.  The patient agrees with this plan.  The patient stable for transfer via private vehicle at this time.  Advised the patient of the associate's of not seeking further care for her current symptoms, and she verbalized understanding.     Plan:  Transfer patient to the [facility] for further evaluation and management.     Please note that this dictation was created using voice recognition software. I have made every reasonable attempt to correct obvious errors, but I expect that there may be errors of grammar and possibly content that I did not discover before finalizing the note.     This note was electronically signed by Yesi Singh  ZHAO

## 2024-06-06 NOTE — ED PROVIDER NOTES
"ED Provider Note    CHIEF COMPLAINT  Chief Complaint   Patient presents with    Sent from Urgent Care    Chest Pain    Shortness of Breath       EXTERNAL RECORDS REVIEWED  Outpatient labs & studies negative cardiac stress test April 2019    HPI/ROS  LIMITATION TO HISTORY   Select: : None  OUTSIDE HISTORIAN(S):  None    An Rainey is a 58 y.o. female who presents after being seen in urgent care and referred to the emergency department.  She awoke Monday morning with pain in her left chest radiating to her left scapula, it is worse with deep breath or movement.  No injury.  She denies similar complaints in the past.  Patient states \"I never had a heart attack but I have a history of angina\".  She has a history of sleep apnea, however does not use CPAP machine.  She denies fever, rhinorrhea or sore throat.  Patient stated she had a negative COVID test this morning.  No abdominal pain.  No nausea or vomiting.  No known trauma.  She does not smoke.  Patient currently takes Eliquis for atrial fibrillation history.    PAST MEDICAL HISTORY   has a past medical history of Allergy, Arrhythmia (11/11/2022), Breath shortness (11/11/2022), GERD (gastroesophageal reflux disease), History of anemia, HTN (hypertension) (04/27/2019), Hypertension, Lymph edema, Seasonal allergies, Sleep apnea, and Snoring.    SURGICAL HISTORY   has a past surgical history that includes hysteroscopy with myosure (N/A, 12/8/2023) and dilation and curettage (N/A, 12/8/2023).    FAMILY HISTORY  Family History   Problem Relation Age of Onset    Cancer Mother     Cancer Father     Cancer Sister     Cancer Maternal Grandmother        SOCIAL HISTORY  Social History     Tobacco Use    Smoking status: Never    Smokeless tobacco: Never   Vaping Use    Vaping status: Never Used   Substance and Sexual Activity    Alcohol use: Yes     Comment: Rarely    Drug use: No    Sexual activity: Not on file       CURRENT MEDICATIONS  Home Medications    **Home " "medications have not yet been reviewed for this encounter**         ALLERGIES  No Known Allergies    PHYSICAL EXAM  VITAL SIGNS: /82   Pulse (!) 57   Temp 36.7 °C (98 °F) (Temporal)   Resp 14   Ht 1.651 m (5' 5\")   Wt (!) 157 kg (347 lb)   LMP 04/01/2019 (Approximate)   SpO2 92%   BMI 57.74 kg/m²    Constitutional: Well-nourished no acute distress  ENT: No epistaxis, no facial swelling  Eyes: No scleral icterus  GI: No abdominal tenderness  Musculoskeletal: Left parasternal tenderness approximately third rib space.  Left parascapular region tender without crepitance or deformity.  Midline spine nontender  Skin: No asymmetric edema  Psychiatric: Normal mood.  Cooperative    EKG/LABS  Results for orders placed or performed during the hospital encounter of 06/06/24   CBC with Differential   Result Value Ref Range    WBC 6.1 4.8 - 10.8 K/uL    RBC 4.54 4.20 - 5.40 M/uL    Hemoglobin 12.8 12.0 - 16.0 g/dL    Hematocrit 40.5 37.0 - 47.0 %    MCV 89.2 81.4 - 97.8 fL    MCH 28.2 27.0 - 33.0 pg    MCHC 31.6 (L) 32.2 - 35.5 g/dL    RDW 49.4 35.9 - 50.0 fL    Platelet Count 289 164 - 446 K/uL    MPV 10.4 9.0 - 12.9 fL    Neutrophils-Polys 55.90 44.00 - 72.00 %    Lymphocytes 32.90 22.00 - 41.00 %    Monocytes 6.80 0.00 - 13.40 %    Eosinophils 3.30 0.00 - 6.90 %    Basophils 0.80 0.00 - 1.80 %    Immature Granulocytes 0.30 0.00 - 0.90 %    Nucleated RBC 0.00 0.00 - 0.20 /100 WBC    Neutrophils (Absolute) 3.38 1.82 - 7.42 K/uL    Lymphs (Absolute) 1.99 1.00 - 4.80 K/uL    Monos (Absolute) 0.41 0.00 - 0.85 K/uL    Eos (Absolute) 0.20 0.00 - 0.51 K/uL    Baso (Absolute) 0.05 0.00 - 0.12 K/uL    Immature Granulocytes (abs) 0.02 0.00 - 0.11 K/uL    NRBC (Absolute) 0.00 K/uL   Complete Metabolic Panel (CMP)   Result Value Ref Range    Sodium 140 135 - 145 mmol/L    Potassium 4.3 3.6 - 5.5 mmol/L    Chloride 101 96 - 112 mmol/L    Co2 25 20 - 33 mmol/L    Anion Gap 14.0 7.0 - 16.0    Glucose 96 65 - 99 mg/dL    Bun 10 8 " - 22 mg/dL    Creatinine 0.60 0.50 - 1.40 mg/dL    Calcium 9.4 8.4 - 10.2 mg/dL    Correct Calcium 9.2 8.5 - 10.5 mg/dL    AST(SGOT) 18 12 - 45 U/L    ALT(SGPT) 16 2 - 50 U/L    Alkaline Phosphatase 86 30 - 99 U/L    Total Bilirubin 0.3 0.1 - 1.5 mg/dL    Albumin 4.3 3.2 - 4.9 g/dL    Total Protein 7.6 6.0 - 8.2 g/dL    Globulin 3.3 1.9 - 3.5 g/dL    A-G Ratio 1.3 g/dL   Troponins NOW   Result Value Ref Range    Troponin T <6 6 - 19 ng/L   ESTIMATED GFR   Result Value Ref Range    GFR (CKD-EPI) 105 >60 mL/min/1.73 m 2   D-DIMER   Result Value Ref Range    D-Dimer 0.68 (H) 0.00 - 0.50 ug/mL (FEU)   EKG   Result Value Ref Range    Report       [facility] Emergency Dept.    Test Date:  2024  Pt Name:    CESARIO BOSS                  Department: [facility]  MRN:        [id number]                      Room:  Gender:     Female                       Technician: dior  :        [id number]                   Requested By:ER TRIAGE PROTOCOL  Order #:    [id number]                    Reading MD: BRANDON HER MD    Measurements  Intervals                                Axis  Rate:       57                           P:          32  MA:         213                          QRS:        24  QRSD:       105                          T:          38  QT:         443  QTc:        432    Interpretive Statements  Sinus rhythm  Prolonged MA interval  Low voltage, precordial leads  Borderline T abnormalities, anterior leads  Compared to ECG 2023 11:18:42  Unchanged  Electronically Signed On 2024 11:04:31 PDT by BRANDON HER MD         I have independently interpreted this EKG    RADIOLOGY/PROCEDURES   I have independently interpreted the diagnostic imaging associated with this visit and am waiting the final reading from the radiologist.   My preliminary interpretation is as follows: CT scan of the chest with IV contrast shows no evidence of large central pulmonary emboli    Radiologist  interpretation:  CT-CTA CHEST PULMONARY ARTERY W/ RECONS   Final Result      1.  No evidence of pulmonary embolus.      2.   Fatty change of the liver.      3.  Small hiatal hernia.          COURSE & MEDICAL DECISION MAKING    ASSESSMENT, COURSE AND PLAN  Care Narrative: Patient presents with 3 days of pain in her chest rating to her left back, worse with touch or movement, worse with deep breath.  Differential diagnosis includes pulmonary embolism, chest wall pain, radicular pain.  Patient lacks risk factors for aortic dissection, symptoms do not appear consistent with aortic dissection.  Patient had positive D-dimer, therefore CT scan of the chest was obtained which was negative for pulmonary emboli.  She had had negative two-view chest x-ray earlier today at urgent care therefore not repeated.  After 3 days of pain, her troponin is negative, ruling out myocardial infarction.  There is no ischemic change on her EKG.  Patient was prescribed tramadol for her pain, she is encouraged to follow-up for recheck with her primary care doctor and to return to the emergency department if worse or for any concerns.  Chest Pain: HEART Score: 2          DISPOSITION AND DISCUSSIONS    Escalation of care considered, and ultimately not performed:acute inpatient care management, however at this time, the patient is most appropriate for outpatient management    Barriers to care at this time, including but not limited to:  None .     Decision tools and prescription drugs considered including, but not limited to: Antibiotics were not indicated, no evidence of pneumonia or other bacterial infection .    FINAL DIAGNOSIS  1. Chest wall pain    2. Left-sided thoracic back pain, unspecified chronicity           Electronically signed by: Yvon Shah M.D., 6/6/2024 11:00 AM

## 2024-06-06 NOTE — LETTER
June 6, 2024         Patient: An Rainey   YOB: 1965   Date of Visit: 6/6/2024           To Whom it May Concern:    An Rainey was seen in my clinic on 6/6/2024. Please excuse her from work 6/6 and 6/7.  She may return to work on 6/10/2024.    If you have any questions or concerns, please don't hesitate to call.        Sincerely,           Yesi Singh P.A.-C.  Electronically Signed

## 2024-06-06 NOTE — ED NOTES
Pt states she has had chest pain for the last four days. Pt states she has swelling in her legs and feet.

## 2024-06-11 DIAGNOSIS — I48.91 ATRIAL FIBRILLATION WITH RAPID VENTRICULAR RESPONSE (HCC): ICD-10-CM

## 2024-06-16 ENCOUNTER — SUPERVISING PHYSICIAN REVIEW (OUTPATIENT)
Dept: URGENT CARE | Facility: CLINIC | Age: 59
End: 2024-06-16
Payer: COMMERCIAL

## 2024-07-11 ENCOUNTER — OFFICE VISIT (OUTPATIENT)
Dept: CARDIOLOGY | Facility: MEDICAL CENTER | Age: 59
End: 2024-07-11
Payer: COMMERCIAL

## 2024-07-11 VITALS
OXYGEN SATURATION: 94 % | BODY MASS INDEX: 48.82 KG/M2 | SYSTOLIC BLOOD PRESSURE: 132 MMHG | HEART RATE: 56 BPM | DIASTOLIC BLOOD PRESSURE: 62 MMHG | RESPIRATION RATE: 18 BRPM | HEIGHT: 65 IN | WEIGHT: 293 LBS

## 2024-07-11 DIAGNOSIS — I48.0 PAROXYSMAL ATRIAL FIBRILLATION (HCC): ICD-10-CM

## 2024-07-11 DIAGNOSIS — E66.01 CLASS 3 SEVERE OBESITY DUE TO EXCESS CALORIES WITH SERIOUS COMORBIDITY AND BODY MASS INDEX (BMI) OF 50.0 TO 59.9 IN ADULT (HCC): ICD-10-CM

## 2024-07-11 DIAGNOSIS — I48.91 ATRIAL FIBRILLATION WITH RAPID VENTRICULAR RESPONSE (HCC): ICD-10-CM

## 2024-07-11 DIAGNOSIS — I48.19 PERSISTENT ATRIAL FIBRILLATION (HCC): ICD-10-CM

## 2024-07-11 DIAGNOSIS — I10 PRIMARY HYPERTENSION: ICD-10-CM

## 2024-07-11 DIAGNOSIS — G47.33 OSA (OBSTRUCTIVE SLEEP APNEA): ICD-10-CM

## 2024-07-11 PROBLEM — E66.813 CLASS 3 SEVERE OBESITY DUE TO EXCESS CALORIES WITH SERIOUS COMORBIDITY AND BODY MASS INDEX (BMI) OF 50.0 TO 59.9 IN ADULT (HCC): Status: ACTIVE | Noted: 2019-12-09

## 2024-07-11 PROCEDURE — 3075F SYST BP GE 130 - 139MM HG: CPT

## 2024-07-11 PROCEDURE — 99213 OFFICE O/P EST LOW 20 MIN: CPT

## 2024-07-11 PROCEDURE — 99214 OFFICE O/P EST MOD 30 MIN: CPT

## 2024-07-11 PROCEDURE — 3078F DIAST BP <80 MM HG: CPT

## 2024-07-11 RX ORDER — FLECAINIDE ACETATE 100 MG/1
100 TABLET ORAL 2 TIMES DAILY
Qty: 180 TABLET | Refills: 3 | Status: SHIPPED | OUTPATIENT
Start: 2024-07-11

## 2024-07-11 ASSESSMENT — ENCOUNTER SYMPTOMS
PALPITATIONS: 1
ORTHOPNEA: 0
CONSTITUTIONAL NEGATIVE: 1
NERVOUS/ANXIOUS: 1
NEUROLOGICAL NEGATIVE: 1
EYES NEGATIVE: 1
PND: 0
MUSCULOSKELETAL NEGATIVE: 1
SHORTNESS OF BREATH: 0
DEPRESSION: 0
GASTROINTESTINAL NEGATIVE: 1

## 2024-07-11 ASSESSMENT — FIBROSIS 4 INDEX: FIB4 SCORE: 0.87

## 2024-08-08 ENCOUNTER — APPOINTMENT (OUTPATIENT)
Dept: MEDICAL GROUP | Facility: MEDICAL CENTER | Age: 59
End: 2024-08-08
Payer: COMMERCIAL

## 2024-09-19 DIAGNOSIS — I10 ESSENTIAL HYPERTENSION: ICD-10-CM

## 2024-09-19 RX ORDER — LISINOPRIL 20 MG/1
20 TABLET ORAL DAILY
Qty: 90 TABLET | Refills: 3 | Status: SHIPPED | OUTPATIENT
Start: 2024-09-19

## 2024-09-19 NOTE — TELEPHONE ENCOUNTER
Received request via: Pharmacy    Was the patient seen in the last year in this department? Yes    Does the patient have an active prescription (recently filled or refills available) for medication(s) requested? No    [facility]    Does the patient have correction Plus and need 100-day supply? (This applies to ALL medications) Patient does not have SCP

## 2024-12-02 NOTE — TELEPHONE ENCOUNTER
Received request via: Pharmacy    Was the patient seen in the last year in this department? Yes    Does the patient have an active prescription (recently filled or refills available) for medication(s) requested? No    [facility]    Does the patient have [facility] Plus and need 100-day supply? (This applies to ALL medications) Patient does not have SCP      Pharmacy comment: Alternative Requested:NEED ADDITIONAL FILLS TO SATISFY INSURANCE'S REQUEST FOR 90 DAY SUPPLY FOR COVERAGE.

## 2024-12-03 RX ORDER — AMLODIPINE BESYLATE 10 MG/1
10 TABLET ORAL
Qty: 100 TABLET | Refills: 3 | Status: SHIPPED | OUTPATIENT
Start: 2024-12-03

## 2025-01-07 ENCOUNTER — OFFICE VISIT (OUTPATIENT)
Dept: MEDICAL GROUP | Facility: MEDICAL CENTER | Age: 60
End: 2025-01-07
Payer: COMMERCIAL

## 2025-01-07 VITALS
BODY MASS INDEX: 48.82 KG/M2 | HEART RATE: 42 BPM | WEIGHT: 293 LBS | HEIGHT: 65 IN | OXYGEN SATURATION: 96 % | TEMPERATURE: 97 F | SYSTOLIC BLOOD PRESSURE: 132 MMHG | DIASTOLIC BLOOD PRESSURE: 74 MMHG

## 2025-01-07 DIAGNOSIS — G47.33 OSA (OBSTRUCTIVE SLEEP APNEA): ICD-10-CM

## 2025-01-07 DIAGNOSIS — I10 ESSENTIAL HYPERTENSION: ICD-10-CM

## 2025-01-07 DIAGNOSIS — I48.0 PAROXYSMAL ATRIAL FIBRILLATION (HCC): ICD-10-CM

## 2025-01-07 DIAGNOSIS — E66.01 SEVERE OBESITY (HCC): ICD-10-CM

## 2025-01-07 DIAGNOSIS — N84.0 ENDOMETRIAL POLYP: ICD-10-CM

## 2025-01-07 DIAGNOSIS — E66.01 MORBID OBESITY (HCC): ICD-10-CM

## 2025-01-07 DIAGNOSIS — Z02.89 ENCOUNTER FOR COMPLETION OF FORM WITH PATIENT: ICD-10-CM

## 2025-01-07 PROCEDURE — 7101 PR PHYSICAL: Performed by: PHYSICIAN ASSISTANT

## 2025-01-07 PROCEDURE — 99214 OFFICE O/P EST MOD 30 MIN: CPT | Performed by: PHYSICIAN ASSISTANT

## 2025-01-07 ASSESSMENT — FIBROSIS 4 INDEX: FIB4 SCORE: 0.89

## 2025-01-07 ASSESSMENT — PATIENT HEALTH QUESTIONNAIRE - PHQ9: CLINICAL INTERPRETATION OF PHQ2 SCORE: 0

## 2025-01-08 NOTE — PROGRESS NOTES
"Subjective:     History of Present Illness  The patient presents for atrial fibrillation, hypertension, lymphedema, obstructive sleep apnea, and weight management.    She reports a fluctuating health status with periods of wellness interspersed with episodes of illness. She has been experiencing episodes of atrial fibrillation, which have necessitated occasional absences from work. She is seeking assistance in completing her FMLA and ADA forms, similar to the process undertaken the previous year. She recalls a week-long absence due to an unspecified illness, during which she tested negative for COVID-19. She is employed as an  at [employer] and does not work remotely. She has been under the care of a cardiologist but has not had a recent consultation.    She has been diagnosed with hypertension.    She has been diagnosed with lymphedema.    She has been diagnosed with obstructive sleep apnea. She has discontinued the use of her CPAP machine, returning it last week.    She expresses interest in weight loss injectables, having previously tried Wegovy. She has explored various weight loss programs, including [facility], but found the upfront cost prohibitive. She also considered the \"Her for Hers\" program but was deterred by the requirement for advance payment for a 12-month period. She experiences pain during exercise, which limits her physical activity. She has been researching affordable GLP-1 options.    SOCIAL HISTORY  She is employed as an  at [employer] and does not work remotely.    FAMILY HISTORY  Her  was diagnosed with atrial fibrillation and has a pacemaker.    MEDICATIONS  Discontinued: Wegovy      Current medicines (including changes today)  Current Outpatient Medications   Medication Sig Dispense Refill    Semaglutide,0.25 or 0.5MG/DOS, 2 MG/3ML Solution Pen-injector Inject 0.25 mg under the skin every 7 days. Repeat weekly. After four weeks, increase to " 0.5mg injected under the skin every seven days 3 mL 3    amLODIPine (NORVASC) 10 MG Tab TAKE 1 TABLET BY MOUTH EVERY  Tablet 3    lisinopril (PRINIVIL) 20 MG Tab TAKE 1 TABLET BY MOUTH EVERY DAY 90 Tablet 3    apixaban (ELIQUIS) 5mg Tab Take 1 Tablet by mouth 2 times a day. 200 Tablet 3    flecainide (TAMBOCOR) 100 MG Tab Take 1 Tablet by mouth 2 times a day. 180 Tablet 3    ondansetron (ZOFRAN ODT) 4 MG TABLET DISPERSIBLE Take 1 Tablet by mouth every 6 hours as needed for Nausea/Vomiting. 10 Tablet 0    albuterol 108 (90 Base) MCG/ACT Aero Soln inhalation aerosol INHALE 2 PUFFS BY MOUTH EVERY 6 HOURS AS NEEDED FOR SHORTNESS OF BREATH 8.5 Each 2    metoprolol tartrate (LOPRESSOR) 25 MG Tab TAKE 1 TABLET BY MOUTH 1 TIME A DAY AS NEEDED (FOR PALPITATIONS). 90 Tablet 3    sertraline (ZOLOFT) 25 MG tablet TAKE 1 TABLET BY MOUTH EVERY DAY 90 Tablet 3    Multiple Vitamin (MULTIVITAMIN PO) Take 1 Tablet by mouth every morning.      B Complex Vitamins (VITAMIN B COMPLEX) Tab Take 1 Tablet by mouth every morning.      BIOTIN PO Take 1 Tablet by mouth every morning.      CALCIUM PO Take 1 Tablet by mouth every morning.      VITAMIN D PO Take 1 Tablet by mouth every morning.      fluticasone (FLONASE) 50 MCG/ACT nasal spray Administer 1 Spray into affected nostril(S) every day.       No current facility-administered medications for this visit.     She  has a past medical history of Allergy, Arrhythmia (11/11/2022), Breath shortness (11/11/2022), GERD (gastroesophageal reflux disease), History of anemia, HTN (hypertension) (04/27/2019), Hypertension, Lymph edema, Seasonal allergies, Sleep apnea, and Snoring.    She has no past medical history of Asthma, Congestive heart failure (HCC), Liver disease, Stroke (HCC), or Type II or unspecified type diabetes mellitus without mention of complication, not stated as uncontrolled.    ROS   No chest pain, no shortness of breath, no abdominal pain  Positive ROS as per HPI.  All  "other systems reviewed and are negative.     Objective:     /74   Pulse (!) 42   Temp 36.1 °C (97 °F) (Temporal)   Ht 1.651 m (5' 5\")   Wt (!) 156 kg (344 lb)   SpO2 96%  Body mass index is 57.24 kg/m².   Physical Exam    Constitutional: Alert, no distress.  Skin: Warm, dry, good turgor, no rashes in visible areas.  Eye: Equal, round and reactive, conjunctiva clear, lids normal.  ENMT: Lips without lesions, good dentition, oropharynx clear.  Neck: Trachea midline, no masses, no thyromegaly. No cervical or supraclavicular lymphadenopathy  Respiratory: Unlabored respiratory effort, lungs clear to auscultation, no wheezes, no ronchi.  Cardiovascular: Normal S1, S2, no murmur, no edema.  Abdomen: Soft, non-tender, no masses, no hepatosplenomegaly.  Psych: Alert and oriented x3, normal affect and mood.      Results          Assessment and Plan:   The following treatment plan was discussed    Assessment & Plan  1. Atrial Fibrillation.  She has hx of atrial fibrillation, which have necessitated occasional absences from work. The FMLA and ADA forms were completed, indicating the need for approximately 2 full days off per month for medical appointments. She was advised to continue her consultations with the cardiologist.    2. Hypertension.  The FMLA and ADA forms were completed, indicating the need for approximately 2 full days off per month for medical appointments.    3. Lymphedema.  The FMLA and ADA forms were completed, indicating the need for approximately 2 full days off per month for medical appointments.    4. Obstructive Sleep Apnea.  She has been diagnosed with obstructive sleep apnea. She has discontinued the use of her CPAP machine, returning it last week.    5. Weight Management.  A prescription for Ozempic was provided, with instructions to take 0.25 mg for 7 days, repeat weekly, and increase to 0.5 mg after 4 weeks. The prescription will be filled at [facility] in [place], and she will " pay cash, with the cost expected to be less than $ 200 per month. She was advised to send a message via [employer] if there are any issues with the medication or if the cost exceeds $ 200 per month.    Follow-up  The patient will follow up in 4 to 6 weeks.    () Today's E/M visit is associated with medical care services that serve as the continuing focal point for all needed health care services and/or with medical care services that are part of ongoing care related to a patient's single, serious condition, or a complex condition: This includes furnishing services to patients on an ongoing basis that result in care that is personalized to the patient. The services result in a comprehensive, longitudinal, and continuous relationship with the patient and involve delivery of team-based care that is accessible, coordinated with other practitioners and providers, and integrated with the broader health care landscape.     ORDERS:  1. Essential hypertension    - Semaglutide,0.25 or 0.5MG/DOS, 2 MG/3ML Solution Pen-injector; Inject 0.25 mg under the skin every 7 days. Repeat weekly. After four weeks, increase to 0.5mg injected under the skin every seven days  Dispense: 3 mL; Refill: 3    2. Paroxysmal atrial fibrillation (HCC)    - Semaglutide,0.25 or 0.5MG/DOS, 2 MG/3ML Solution Pen-injector; Inject 0.25 mg under the skin every 7 days. Repeat weekly. After four weeks, increase to 0.5mg injected under the skin every seven days  Dispense: 3 mL; Refill: 3    3. Endometrial polyp      4. Morbid obesity (HCC)    - Semaglutide,0.25 or 0.5MG/DOS, 2 MG/3ML Solution Pen-injector; Inject 0.25 mg under the skin every 7 days. Repeat weekly. After four weeks, increase to 0.5mg injected under the skin every seven days  Dispense: 3 mL; Refill: 3    5. EVERARDO (obstructive sleep apnea)    - Semaglutide,0.25 or 0.5MG/DOS, 2 MG/3ML Solution Pen-injector; Inject 0.25 mg under the skin every 7 days. Repeat weekly. After four weeks, increase to  0.5mg injected under the skin every seven days  Dispense: 3 mL; Refill: 3    6. Encounter for completion of form with patient        Anticipatory guidance discussed at length including regular daily exercise with a goal of 150 minutes a week.  Recommendation to eat the Mediterranean diet to decrease cardiovascular risk.  Encouraged avoiding high fructose corn syrup and other simple sugars to reduce risk of obesity and type 2 diabetes.    Follow Up: 8 weeks    Please note that this dictation was created using voice recognition software. I have made every reasonable attempt to correct obvious errors, but I expect that there are errors of grammar and possibly content that I did not discover before finalizing the note.      Attestation      Verbal consent was acquired by the patient to use [email or web address] ambient listening note generation during this visit Yes

## 2025-01-30 ENCOUNTER — TELEPHONE (OUTPATIENT)
Dept: MEDICAL GROUP | Facility: MEDICAL CENTER | Age: 60
End: 2025-01-30
Payer: COMMERCIAL

## 2025-01-30 DIAGNOSIS — Z12.11 SCREENING FOR COLON CANCER: ICD-10-CM

## 2025-02-18 ENCOUNTER — APPOINTMENT (OUTPATIENT)
Dept: MEDICAL GROUP | Age: 60
End: 2025-02-18
Payer: COMMERCIAL

## 2025-02-18 VITALS
WEIGHT: 293 LBS | TEMPERATURE: 97.5 F | HEIGHT: 65 IN | BODY MASS INDEX: 48.82 KG/M2 | HEART RATE: 78 BPM | DIASTOLIC BLOOD PRESSURE: 82 MMHG | OXYGEN SATURATION: 96 % | SYSTOLIC BLOOD PRESSURE: 124 MMHG

## 2025-02-18 DIAGNOSIS — Z12.31 ENCOUNTER FOR SCREENING MAMMOGRAM FOR BREAST CANCER: ICD-10-CM

## 2025-02-18 DIAGNOSIS — E66.01 CLASS 3 SEVERE OBESITY DUE TO EXCESS CALORIES WITH SERIOUS COMORBIDITY AND BODY MASS INDEX (BMI) OF 50.0 TO 59.9 IN ADULT (HCC): ICD-10-CM

## 2025-02-18 DIAGNOSIS — E66.813 CLASS 3 SEVERE OBESITY DUE TO EXCESS CALORIES WITH SERIOUS COMORBIDITY AND BODY MASS INDEX (BMI) OF 50.0 TO 59.9 IN ADULT (HCC): ICD-10-CM

## 2025-02-18 PROCEDURE — 3079F DIAST BP 80-89 MM HG: CPT | Performed by: PHYSICIAN ASSISTANT

## 2025-02-18 PROCEDURE — 99214 OFFICE O/P EST MOD 30 MIN: CPT | Performed by: PHYSICIAN ASSISTANT

## 2025-02-18 PROCEDURE — 3074F SYST BP LT 130 MM HG: CPT | Performed by: PHYSICIAN ASSISTANT

## 2025-02-18 ASSESSMENT — FIBROSIS 4 INDEX: FIB4 SCORE: 0.89

## 2025-02-19 NOTE — PROGRESS NOTES
Subjective:     History of Present Illness  The patient presents for weight management.    She reports a satisfactory response to her current medication regimen, with a significant weight loss of 15 pounds. Her initial weight was recorded at 344.8 pounds, which has now decreased to approximately 330 pounds. She has been on a 0.5 mg dose of semaglutide for 2 cycles. She also mentions that her heartburn symptoms have resolved, but she continues to experience persistent gastric discomfort, describing it as a sensation of constant acid production in her stomach. She is due for a mammogram. She is going to do Cologuard this weekend.    MEDICATIONS  semaglutide      Current medicines (including changes today)  Current Outpatient Medications   Medication Sig Dispense Refill    Semaglutide (WEGOVY) 1 MG/0.5ML Solution Auto-injector Pen-injector Inject 1mg under the skine every seven days and repeat weekly 2 mL 2    amLODIPine (NORVASC) 10 MG Tab TAKE 1 TABLET BY MOUTH EVERY  Tablet 3    lisinopril (PRINIVIL) 20 MG Tab TAKE 1 TABLET BY MOUTH EVERY DAY 90 Tablet 3    apixaban (ELIQUIS) 5mg Tab Take 1 Tablet by mouth 2 times a day. 200 Tablet 3    flecainide (TAMBOCOR) 100 MG Tab Take 1 Tablet by mouth 2 times a day. 180 Tablet 3    ondansetron (ZOFRAN ODT) 4 MG TABLET DISPERSIBLE Take 1 Tablet by mouth every 6 hours as needed for Nausea/Vomiting. 10 Tablet 0    albuterol 108 (90 Base) MCG/ACT Aero Soln inhalation aerosol INHALE 2 PUFFS BY MOUTH EVERY 6 HOURS AS NEEDED FOR SHORTNESS OF BREATH 8.5 Each 2    metoprolol tartrate (LOPRESSOR) 25 MG Tab TAKE 1 TABLET BY MOUTH 1 TIME A DAY AS NEEDED (FOR PALPITATIONS). 90 Tablet 3    sertraline (ZOLOFT) 25 MG tablet TAKE 1 TABLET BY MOUTH EVERY DAY 90 Tablet 3    Multiple Vitamin (MULTIVITAMIN PO) Take 1 Tablet by mouth every morning.      B Complex Vitamins (VITAMIN B COMPLEX) Tab Take 1 Tablet by mouth every morning.      BIOTIN PO Take 1 Tablet by mouth every morning.       "CALCIUM PO Take 1 Tablet by mouth every morning.      VITAMIN D PO Take 1 Tablet by mouth every morning.      fluticasone (FLONASE) 50 MCG/ACT nasal spray Administer 1 Spray into affected nostril(S) every day.       No current facility-administered medications for this visit.     She  has a past medical history of Allergy, Arrhythmia (11/11/2022), Breath shortness (11/11/2022), GERD (gastroesophageal reflux disease), History of anemia, HTN (hypertension) (04/27/2019), Hypertension, Lymph edema, Seasonal allergies, Sleep apnea, and Snoring.    She has no past medical history of Asthma, Congestive heart failure (HCC), Liver disease, Stroke (HCC), or Type II or unspecified type diabetes mellitus without mention of complication, not stated as uncontrolled.    ROS   No chest pain, no shortness of breath, no abdominal pain  Positive ROS as per HPI.  All other systems reviewed and are negative.     Objective:     /82   Pulse 78   Temp 36.4 °C (97.5 °F) (Temporal)   Ht 1.651 m (5' 5\")   Wt (!) 150 kg (330 lb)   SpO2 96%  Body mass index is 54.91 kg/m².   Physical Exam  Vital Signs  Weight is 330 pounds.  Constitutional: Alert, no distress.  Skin: Warm, dry, good turgor, no rashes in visible areas.  Eye: Equal, round and reactive, conjunctiva clear, lids normal.  ENMT: Lips without lesions, good dentition, oropharynx clear.  Neck: Trachea midline, no masses, no thyromegaly. No cervical or supraclavicular lymphadenopathy  Respiratory: Unlabored respiratory effort, lungs clear to auscultation, no wheezes, no ronchi.  Cardiovascular: Normal S1, S2, no murmur, no edema.  Abdomen: Soft, non-tender, no masses, no hepatosplenomegaly.  Psych: Alert and oriented x3, normal affect and mood.      Results          Assessment and Plan:   The following treatment plan was discussed    Assessment & Plan  1. Obesity: Weight management.  She has achieved a weight loss of 15 pounds, currently weighing 330 pounds. She is on " semaglutide 0.5 mg weekly. She will continue the 0.5 mg dosage for the next 2 weeks, after which the dosage will be increased to 1 mg weekly. She is advised to maintain adequate hydration and consume small meals to manage potential gastrointestinal upset. A prescription for semaglutide 1 mg has been provided. Laboratory tests have been ordered to monitor liver and kidney function, as well as electrolyte levels, to ensure they are not adversely affected by the medication.    2. Health Maintenance.  A mammogram has been ordered. She is also scheduled to complete her Cologuard test this weekend.    Follow-up  The patient will follow up in 6 weeks.      ORDERS:  1. Class 3 severe obesity due to excess calories with serious comorbidity and body mass index (BMI) of 50.0 to 59.9 in adult (HCC)    - Semaglutide (WEGOVY) 1 MG/0.5ML Solution Auto-injector Pen-injector; Inject 1mg under the skine every seven days and repeat weekly  Dispense: 2 mL; Refill: 2  - Comp Metabolic Panel; Future  - CBC WITH DIFFERENTIAL; Future    2. Encounter for screening mammogram for breast cancer    - MA-SCREENING MAMMO BILAT W/TOMOSYNTHESIS W/CAD; Future          Follow Up: 4 weeks for next weight check and discussion regarding meds    Please note that this dictation was created using voice recognition software. I have made every reasonable attempt to correct obvious errors, but I expect that there are errors of grammar and possibly content that I did not discover before finalizing the note.      Attestation      Verbal consent was acquired by the patient to use [employer] ambient listening note generation during this visit Yes

## 2025-02-19 NOTE — ASSESSMENT & PLAN NOTE
Chronic: Patient saw vascular medicine years ago and had ultrasounds that showed patent vessels.  Has struggled with lymphedema.  Compression stockings help.  Standing or having feet in dependent position worsens..  No ulcers or breaks in the skin    Fluid has improved with loss of weight over the last month.  Not as much redness.  Has not been wearing compression stockings due to heat and they are uncomfortable.  But notes she will wear them when needed   Patient: Praveen Hines    Procedure Summary       Date: 02/19/25 Room / Location: [facility] MAIN OR  / [facility] MAIN OR    Anesthesia Start: 1253 Anesthesia Stop: 1439    Procedures:       Retrograde urethrogram, cystogram, cystoscopy, optical internal urethrotomy, Mitomycin injection of the urethra, ureteroscopy and laser ureterotomy, right stent placement (Urethra)      CYSTOSCOPY TRANSURETHRAL INJECTION OF CONTIGEN (Urethra)      LASER HOLMIUM LITHOTRIPSY (Urethra)      CYSTOSCOPY URETEROSCOPY (Urethra) Diagnosis: (Preop testing, incomplete bladder emptying, membranous urethral structure, BPH with obstruction/lower urinary tract symptoms, lower urinary tract symptoms)    Surgeons: Reza Palafox MD Anesthesiologist: Tamraa Kohler MD    Anesthesia Type: general ASA Status: 3            Anesthesia Type: general    Vitals Value Taken Time   /91 02/19/25 1439   Temp 98 °F (36.7 °C) 02/19/25 1439   Pulse 62 02/19/25 1439   Resp 16 02/19/25 1439   SpO2 97 % 02/19/25 1439   Vitals shown include unfiled device data.    [facility] AN Post Evaluation:   Patient Evaluated in PACU  Patient Participation: complete - patient participated  Level of Consciousness: awake  Pain Management: adequate  Airway Patency:patent  Dental exam unchanged from preop  Yes    Nausea/Vomiting: none  Cardiovascular Status: acceptable and hemodynamically stable  Respiratory Status: acceptable, nonlabored ventilation, spontaneous ventilation and nasal cannula  Postoperative Hydration acceptable      Tamara Kohler MD  2/19/2025 2:40 PM

## 2025-02-27 ENCOUNTER — RESULTS FOLLOW-UP (OUTPATIENT)
Dept: MEDICAL GROUP | Age: 60
End: 2025-02-27

## 2025-02-27 LAB — NONINV COLON CA DNA+OCC BLD SCRN STL QL: NEGATIVE

## 2025-02-28 DIAGNOSIS — F41.9 ANXIETY: ICD-10-CM

## 2025-03-04 RX ORDER — SERTRALINE HYDROCHLORIDE 25 MG/1
25 TABLET, FILM COATED ORAL DAILY
Qty: 90 TABLET | Refills: 3 | Status: SHIPPED | OUTPATIENT
Start: 2025-03-04

## 2025-03-26 ENCOUNTER — HOSPITAL ENCOUNTER (OUTPATIENT)
Facility: MEDICAL CENTER | Age: 60
End: 2025-03-26
Attending: PHYSICIAN ASSISTANT
Payer: COMMERCIAL

## 2025-03-26 ENCOUNTER — APPOINTMENT (OUTPATIENT)
Dept: RADIOLOGY | Facility: MEDICAL CENTER | Age: 60
End: 2025-03-26
Attending: PHYSICIAN ASSISTANT
Payer: COMMERCIAL

## 2025-03-26 DIAGNOSIS — E66.01 CLASS 3 SEVERE OBESITY DUE TO EXCESS CALORIES WITH SERIOUS COMORBIDITY AND BODY MASS INDEX (BMI) OF 50.0 TO 59.9 IN ADULT (HCC): ICD-10-CM

## 2025-03-26 DIAGNOSIS — Z12.31 ENCOUNTER FOR SCREENING MAMMOGRAM FOR BREAST CANCER: ICD-10-CM

## 2025-03-26 DIAGNOSIS — E66.813 CLASS 3 SEVERE OBESITY DUE TO EXCESS CALORIES WITH SERIOUS COMORBIDITY AND BODY MASS INDEX (BMI) OF 50.0 TO 59.9 IN ADULT (HCC): ICD-10-CM

## 2025-03-26 LAB
ALBUMIN SERPL BCP-MCNC: 4 G/DL (ref 3.2–4.9)
ALBUMIN/GLOB SERPL: 1.4 G/DL
ALP SERPL-CCNC: 80 U/L (ref 30–99)
ALT SERPL-CCNC: 18 U/L (ref 2–50)
ANION GAP SERPL CALC-SCNC: 9 MMOL/L (ref 7–16)
AST SERPL-CCNC: 22 U/L (ref 12–45)
BASOPHILS # BLD AUTO: 0.7 % (ref 0–1.8)
BASOPHILS # BLD: 0.05 K/UL (ref 0–0.12)
BILIRUB SERPL-MCNC: 0.4 MG/DL (ref 0.1–1.5)
BUN SERPL-MCNC: 12 MG/DL (ref 8–22)
CALCIUM ALBUM COR SERPL-MCNC: 8.9 MG/DL (ref 8.5–10.5)
CALCIUM SERPL-MCNC: 8.9 MG/DL (ref 8.4–10.2)
CHLORIDE SERPL-SCNC: 104 MMOL/L (ref 96–112)
CO2 SERPL-SCNC: 26 MMOL/L (ref 20–33)
CREAT SERPL-MCNC: 0.81 MG/DL (ref 0.5–1.4)
EOSINOPHIL # BLD AUTO: 0.23 K/UL (ref 0–0.51)
EOSINOPHIL NFR BLD: 3.3 % (ref 0–6.9)
ERYTHROCYTE [DISTWIDTH] IN BLOOD BY AUTOMATED COUNT: 50.4 FL (ref 35.9–50)
GFR SERPLBLD CREATININE-BSD FMLA CKD-EPI: 84 ML/MIN/1.73 M 2
GLOBULIN SER CALC-MCNC: 2.9 G/DL (ref 1.9–3.5)
GLUCOSE SERPL-MCNC: 94 MG/DL (ref 65–99)
HCT VFR BLD AUTO: 41.2 % (ref 37–47)
HGB BLD-MCNC: 12.7 G/DL (ref 12–16)
IMM GRANULOCYTES # BLD AUTO: 0.01 K/UL (ref 0–0.11)
IMM GRANULOCYTES NFR BLD AUTO: 0.1 % (ref 0–0.9)
LYMPHOCYTES # BLD AUTO: 1.79 K/UL (ref 1–4.8)
LYMPHOCYTES NFR BLD: 25.6 % (ref 22–41)
MCH RBC QN AUTO: 27.8 PG (ref 27–33)
MCHC RBC AUTO-ENTMCNC: 30.8 G/DL (ref 32.2–35.5)
MCV RBC AUTO: 90.2 FL (ref 81.4–97.8)
MONOCYTES # BLD AUTO: 0.46 K/UL (ref 0–0.85)
MONOCYTES NFR BLD AUTO: 6.6 % (ref 0–13.4)
NEUTROPHILS # BLD AUTO: 4.45 K/UL (ref 1.82–7.42)
NEUTROPHILS NFR BLD: 63.7 % (ref 44–72)
NRBC # BLD AUTO: 0 K/UL
NRBC BLD-RTO: 0 /100 WBC (ref 0–0.2)
PLATELET # BLD AUTO: 308 K/UL (ref 164–446)
PMV BLD AUTO: 10.9 FL (ref 9–12.9)
POTASSIUM SERPL-SCNC: 4.5 MMOL/L (ref 3.6–5.5)
PROT SERPL-MCNC: 6.9 G/DL (ref 6–8.2)
RBC # BLD AUTO: 4.57 M/UL (ref 4.2–5.4)
SODIUM SERPL-SCNC: 139 MMOL/L (ref 135–145)
WBC # BLD AUTO: 7 K/UL (ref 4.8–10.8)

## 2025-03-26 PROCEDURE — 77067 SCR MAMMO BI INCL CAD: CPT

## 2025-03-26 PROCEDURE — 80053 COMPREHEN METABOLIC PANEL: CPT

## 2025-03-26 PROCEDURE — 85025 COMPLETE CBC W/AUTO DIFF WBC: CPT

## 2025-03-26 PROCEDURE — 36415 COLL VENOUS BLD VENIPUNCTURE: CPT

## 2025-03-30 ENCOUNTER — RESULTS FOLLOW-UP (OUTPATIENT)
Dept: MEDICAL GROUP | Age: 60
End: 2025-03-30

## 2025-04-17 ENCOUNTER — HOSPITAL ENCOUNTER (OUTPATIENT)
Facility: MEDICAL CENTER | Age: 60
End: 2025-04-17
Attending: PHYSICIAN ASSISTANT
Payer: COMMERCIAL

## 2025-04-17 ENCOUNTER — APPOINTMENT (OUTPATIENT)
Dept: MEDICAL GROUP | Age: 60
End: 2025-04-17
Payer: COMMERCIAL

## 2025-04-17 VITALS
WEIGHT: 293 LBS | TEMPERATURE: 96.7 F | DIASTOLIC BLOOD PRESSURE: 72 MMHG | SYSTOLIC BLOOD PRESSURE: 124 MMHG | OXYGEN SATURATION: 96 % | HEIGHT: 65 IN | BODY MASS INDEX: 48.82 KG/M2 | HEART RATE: 56 BPM

## 2025-04-17 DIAGNOSIS — E66.01 MORBID OBESITY (HCC): ICD-10-CM

## 2025-04-17 DIAGNOSIS — I48.91 ATRIAL FIBRILLATION, UNSPECIFIED TYPE (HCC): ICD-10-CM

## 2025-04-17 DIAGNOSIS — N30.01 ACUTE CYSTITIS WITH HEMATURIA: ICD-10-CM

## 2025-04-17 DIAGNOSIS — R35.0 FREQUENCY OF URINATION: ICD-10-CM

## 2025-04-17 DIAGNOSIS — I10 ESSENTIAL HYPERTENSION: ICD-10-CM

## 2025-04-17 LAB
APPEARANCE UR: NORMAL
BILIRUB UR STRIP-MCNC: NORMAL MG/DL
COLOR UR AUTO: YELLOW
GLUCOSE UR STRIP.AUTO-MCNC: NORMAL MG/DL
KETONES UR STRIP.AUTO-MCNC: NORMAL MG/DL
LEUKOCYTE ESTERASE UR QL STRIP.AUTO: NORMAL
NITRITE UR QL STRIP.AUTO: NORMAL
PH UR STRIP.AUTO: 6 [PH] (ref 5–8)
PROT UR QL STRIP: NORMAL MG/DL
RBC UR QL AUTO: NORMAL
SP GR UR STRIP.AUTO: 1.02
UROBILINOGEN UR STRIP-MCNC: 0.2 MG/DL

## 2025-04-17 PROCEDURE — 3074F SYST BP LT 130 MM HG: CPT | Performed by: PHYSICIAN ASSISTANT

## 2025-04-17 PROCEDURE — 87186 SC STD MICRODIL/AGAR DIL: CPT

## 2025-04-17 PROCEDURE — 87077 CULTURE AEROBIC IDENTIFY: CPT

## 2025-04-17 PROCEDURE — 87086 URINE CULTURE/COLONY COUNT: CPT

## 2025-04-17 PROCEDURE — 3078F DIAST BP <80 MM HG: CPT | Performed by: PHYSICIAN ASSISTANT

## 2025-04-17 PROCEDURE — 81002 URINALYSIS NONAUTO W/O SCOPE: CPT | Performed by: PHYSICIAN ASSISTANT

## 2025-04-17 PROCEDURE — 99214 OFFICE O/P EST MOD 30 MIN: CPT | Performed by: PHYSICIAN ASSISTANT

## 2025-04-17 RX ORDER — NITROFURANTOIN 25; 75 MG/1; MG/1
100 CAPSULE ORAL 2 TIMES DAILY
Qty: 14 CAPSULE | Refills: 0 | Status: SHIPPED | OUTPATIENT
Start: 2025-04-17 | End: 2025-04-24

## 2025-04-17 RX ORDER — TIRZEPATIDE 2.5 MG/.5ML
INJECTION, SOLUTION SUBCUTANEOUS
Qty: 2 ML | Refills: 2 | Status: SHIPPED | OUTPATIENT
Start: 2025-04-17

## 2025-04-17 ASSESSMENT — FIBROSIS 4 INDEX: FIB4 SCORE: .959644917324600212

## 2025-04-17 NOTE — PROGRESS NOTES
Subjective:     History of Present Illness  The patient presents for evaluation of urinary tract infection, paroxysmal atrial fibrillation, hypertension, and obesity.    Urinary frequency has been experienced for the past week, accompanied by transient pain lasting approximately 10 seconds. No known allergies to antibiotics are reported.    Paroxysmal atrial fibrillation occasionally exacerbates during work hours. Care is provided by cardiologist Dr. Arti Harrell, who has advised taking additional metoprolol in the event of pain. The current regimen includes Eliquis twice daily and metoprolol as needed.    Hypertension is well-managed at present, with stable blood pressure readings.    A weight loss of 35 pounds on Ozempic is reported. She is not prediabetic. Currently on Wegovy 1 mg, there is interest in increasing the dose to 2 mg. An online pharmacy, [facility], has been found to carry it.    [place] is requested for job protection when calling out sick. She has been seeing us since 2021 and is requesting 4 days off per month.      Current medicines (including changes today)  Current Outpatient Medications   Medication Sig Dispense Refill    nitrofurantoin (MACROBID) 100 MG Cap Take 1 Capsule by mouth 2 times a day for 7 days. 14 Capsule 0    Tirzepatide-Weight Management (ZEPBOUND) 2.5 MG/0.5ML Solution vial Inject 2.5mg under the skin weekly for four weeks, then increase the dose to 0.5mg injected into skin every seven days. Dx obesity 2 mL 2    sertraline (ZOLOFT) 25 MG tablet TAKE 1 TABLET BY MOUTH EVERY DAY 90 Tablet 3    amLODIPine (NORVASC) 10 MG Tab TAKE 1 TABLET BY MOUTH EVERY  Tablet 3    lisinopril (PRINIVIL) 20 MG Tab TAKE 1 TABLET BY MOUTH EVERY DAY 90 Tablet 3    apixaban (ELIQUIS) 5mg Tab Take 1 Tablet by mouth 2 times a day. 200 Tablet 3    flecainide (TAMBOCOR) 100 MG Tab Take 1 Tablet by mouth 2 times a day. 180 Tablet 3    ondansetron (ZOFRAN ODT) 4 MG TABLET DISPERSIBLE Take 1  "Tablet by mouth every 6 hours as needed for Nausea/Vomiting. 10 Tablet 0    albuterol 108 (90 Base) MCG/ACT Aero Soln inhalation aerosol INHALE 2 PUFFS BY MOUTH EVERY 6 HOURS AS NEEDED FOR SHORTNESS OF BREATH 8.5 Each 2    metoprolol tartrate (LOPRESSOR) 25 MG Tab TAKE 1 TABLET BY MOUTH 1 TIME A DAY AS NEEDED (FOR PALPITATIONS). 90 Tablet 3    Multiple Vitamin (MULTIVITAMIN PO) Take 1 Tablet by mouth every morning.      B Complex Vitamins (VITAMIN B COMPLEX) Tab Take 1 Tablet by mouth every morning.      BIOTIN PO Take 1 Tablet by mouth every morning.      CALCIUM PO Take 1 Tablet by mouth every morning.      VITAMIN D PO Take 1 Tablet by mouth every morning.      fluticasone (FLONASE) 50 MCG/ACT nasal spray Administer 1 Spray into affected nostril(S) every day.       No current facility-administered medications for this visit.     She  has a past medical history of Allergy, Arrhythmia (11/11/2022), Breath shortness (11/11/2022), GERD (gastroesophageal reflux disease), History of anemia, HTN (hypertension) (04/27/2019), Hypertension, Lymph edema, Seasonal allergies, Sleep apnea, and Snoring.    She has no past medical history of Asthma, Congestive heart failure (HCC), Liver disease, Stroke (HCC), or Type II or unspecified type diabetes mellitus without mention of complication, not stated as uncontrolled.    ROS   No chest pain, no shortness of breath, no abdominal pain  Positive ROS as per HPI.  All other systems reviewed and are negative.     Objective:     /72   Pulse (!) 56   Temp 35.9 °C (96.7 °F) (Temporal)   Ht 1.651 m (5' 5\")   Wt (!) 148 kg (326 lb 6.4 oz)   SpO2 96%  Body mass index is 54.32 kg/m².   Physical Exam    Constitutional: Alert, no distress.  Skin: Warm, dry, good turgor, no rashes in visible areas.  Eye: Equal, round and reactive, conjunctiva clear, lids normal.  ENMT: Lips without lesions, good dentition, oropharynx clear.  Neck: Trachea midline, no masses, no thyromegaly. No " cervical or supraclavicular lymphadenopathy  Respiratory: Unlabored respiratory effort, lungs clear to auscultation, no wheezes, no ronchi.  Cardiovascular: Normal S1, S2, no murmur, no edema.  Abdomen: Soft, non-tender, no masses, no hepatosplenomegaly.  Psych: Alert and oriented x3, normal affect and mood.      Results  Labs   - Urinalysis: Large leukocytes and a moderate amount of blood        Assessment and Plan:   The following treatment plan was discussed    Assessment & Plan  1. Urinary Tract Infection.  - Her urinalysis results indicate the presence of large leukocytes and a moderate amount of blood, suggesting a urinary tract infection.  - A prescription for an antibiotic will be sent to [facility].  - A urine culture will be ordered to guide potential future modifications in the antibiotic regimen.  - She reported symptoms of urinary frequency and a brief flash of pain lasting approximately 10 seconds.    2. Paroxysmal Atrial Fibrillation.  - She experiences intermittent episodes of atrial fibrillation, particularly during stressful periods at work.  - Physical exam findings and cardiology follow-ups confirm the diagnosis.  - She takes Eliquis twice a day and metoprolol as needed, as per her cardiologist's recommendation.  - Continued cardiology follow-up is advised to manage the condition effectively.    3. Hypertension.  - Her hypertension is currently well-controlled.  - Blood pressure monitoring shows steady readings, improved with weight loss and medication.  - She should continue her current medication regimen and monitor her blood pressure regularly.  - Discussed the impact of Wegovy on heart rate and bowel movements.    4. Obesity current weight is 326lbs  - She has lost 35 pounds on Ozempic.  - She is on Wegovy 1 mg and is interested in increasing the dose to 2 mg.  - Discussed that [facility] went out of business and does not carry it anymore. Zepbound is another option, which is tirzepatide and Marium,  the maker of it has an online cash and it is 400 dollars a month. It has 2 hormones in it and can be life changing. She is a great candidate for it.  - A prescription for Zepbound will be issued, with instructions to administer 2.5 mg weekly for 4 weeks, followed by an increase in dosage to 5 mg every 7 days. The prescription will be sent to [facility].    5. FMLA paperwork.  - FMLA paperwork was completed today for her conditions including hypertension, atrial fibrillation, and morbid obesity.  - She needs time off for medical appointments or urgent care visits related to these conditions.  - The duration of the leave is set from 04/17/2025 to 04/16/2026, with an allowance of 4 full days off per month.    () Today's E/M visit is associated with medical care services that serve as the continuing focal point for all needed health care services and/or with medical care services that are part of ongoing care related to a patient's single, serious condition, or a complex condition: This includes furnishing services to patients on an ongoing basis that result in care that is personalized to the patient. The services result in a comprehensive, longitudinal, and continuous relationship with the patient and involve delivery of team-based care that is accessible, coordinated with other practitioners and providers, and integrated with the broader health care landscape.     ORDERS:  1. Frequency of urination    - POCT Urinalysis    2. Acute cystitis with hematuria    - URINE CULTURE(NEW); Future  - nitrofurantoin (MACROBID) 100 MG Cap; Take 1 Capsule by mouth 2 times a day for 7 days.  Dispense: 14 Capsule; Refill: 0    3. Atrial fibrillation, unspecified type (HCC)      4. Essential hypertension      5. Morbid obesity (HCC)    - Tirzepatide-Weight Management (ZEPBOUND) 2.5 MG/0.5ML Solution vial; Inject 2.5mg under the skin weekly for four weeks, then increase the dose to 0.5mg injected into skin every seven days. Dx  obesity  Dispense: 2 mL; Refill: 2          Follow Up: 12 weeks    Please note that this dictation was created using voice recognition software. I have made every reasonable attempt to correct obvious errors, but I expect that there are errors of grammar and possibly content that I did not discover before finalizing the note.      Attestation      Verbal consent was acquired by the patient to use [employer] ambient listening note generation during this visit Yes

## 2025-04-20 LAB
BACTERIA UR CULT: ABNORMAL
BACTERIA UR CULT: ABNORMAL
SIGNIFICANT IND 70042: ABNORMAL
SITE SITE: ABNORMAL
SOURCE SOURCE: ABNORMAL

## 2025-04-21 ENCOUNTER — RESULTS FOLLOW-UP (OUTPATIENT)
Dept: MEDICAL GROUP | Age: 60
End: 2025-04-21

## 2025-05-01 DIAGNOSIS — E66.01 MORBID OBESITY (HCC): ICD-10-CM

## 2025-05-01 RX ORDER — TIRZEPATIDE 2.5 MG/.5ML
INJECTION, SOLUTION SUBCUTANEOUS
Qty: 2 ML | Refills: 2 | Status: SHIPPED | OUTPATIENT
Start: 2025-05-01

## 2025-06-05 DIAGNOSIS — I10 PRIMARY HYPERTENSION: Primary | ICD-10-CM

## 2025-06-05 DIAGNOSIS — I48.11 LONGSTANDING PERSISTENT ATRIAL FIBRILLATION (HCC): ICD-10-CM

## 2025-06-05 RX ORDER — METOPROLOL TARTRATE 25 MG/1
TABLET, FILM COATED ORAL
Qty: 90 TABLET | Refills: 0 | Status: SHIPPED | OUTPATIENT
Start: 2025-06-05

## 2025-06-05 NOTE — TELEPHONE ENCOUNTER
Is the patient due for a refill? Yes    Was the patient seen the last 15 months? Yes    Date of last office visit: 7/11/2024    Does the patient have an upcoming appointment?  No      Provider to refill:LH    Does the patient have [facility] and need 100-day supply? (This applies to ALL medications) Patient does not have [facility]

## 2025-06-10 ENCOUNTER — OFFICE VISIT (OUTPATIENT)
Dept: MEDICAL GROUP | Age: 60
End: 2025-06-10
Payer: COMMERCIAL

## 2025-06-10 VITALS
OXYGEN SATURATION: 95 % | HEIGHT: 65 IN | BODY MASS INDEX: 48.82 KG/M2 | WEIGHT: 293 LBS | TEMPERATURE: 98.2 F | HEART RATE: 55 BPM

## 2025-06-10 DIAGNOSIS — E66.01 MORBID OBESITY (HCC): ICD-10-CM

## 2025-06-10 DIAGNOSIS — Z02.89 ENCOUNTER FOR COMPLETION OF FORM WITH PATIENT: ICD-10-CM

## 2025-06-10 DIAGNOSIS — I10 ESSENTIAL HYPERTENSION: ICD-10-CM

## 2025-06-10 DIAGNOSIS — I48.0 PAROXYSMAL ATRIAL FIBRILLATION (HCC): Primary | ICD-10-CM

## 2025-06-10 DIAGNOSIS — F41.9 ANXIETY: ICD-10-CM

## 2025-06-10 PROCEDURE — 99214 OFFICE O/P EST MOD 30 MIN: CPT | Performed by: PHYSICIAN ASSISTANT

## 2025-06-10 RX ORDER — TIRZEPATIDE 5 MG/.5ML
5 INJECTION, SOLUTION SUBCUTANEOUS
Qty: 2 ML | Refills: 1 | Status: SHIPPED | OUTPATIENT
Start: 2025-06-10 | End: 2025-07-08

## 2025-06-10 ASSESSMENT — FIBROSIS 4 INDEX: FIB4 SCORE: .959644917324600212

## 2025-06-10 NOTE — PROGRESS NOTES
"Subjective:     History of Present Illness  The patient presents for form completion and weight management.    She has been proactive in completing her FMLA paperwork, which she has brought for review and signature. She is scheduled for a routine appointment next month and a cardiology consultation in August 2025.    She reports an increase in physical activity due to frequent hospital visits during her grandson's illness, which has resulted in muscle gain. Despite this, she continues to struggle with weight loss. She is currently on Zepbound 2.5 mg, which she plans to refill on Thursday or Friday. She initially experienced an increase in appetite and weight gain but has since lost a pound, bringing her current weight to 329.4 pounds. She notes that her clothes are fitting more loosely, indicating fat loss and muscle gain. She also mentions that the medication does not induce nausea as frequently as semaglutide did. She has developed an aversion to coffee and chocolate, which she attributes to the medication.      Current medicines (including changes today)  Current Medications[1]  She  has a past medical history of Allergy, Arrhythmia (11/11/2022), Breath shortness (11/11/2022), GERD (gastroesophageal reflux disease), History of anemia, HTN (hypertension) (04/27/2019), Hypertension, Lymph edema, Seasonal allergies, Sleep apnea, and Snoring.    She has no past medical history of Asthma, Congestive heart failure (HCC), Liver disease, Stroke (HCC), or Type II or unspecified type diabetes mellitus without mention of complication, not stated as uncontrolled.    ROS   No chest pain, no shortness of breath, no abdominal pain  Positive ROS as per HPI.  All other systems reviewed and are negative.     Objective:     Pulse (!) 55   Temp 36.8 °C (98.2 °F) (Temporal)   Ht 1.651 m (5' 5\")   Wt (!) 149 kg (329 lb)   SpO2 95%  Body mass index is 54.75 kg/m².   Physical Exam  General: Obese  Constitutional: Alert, no " distress.  Skin: Warm, dry, good turgor, no rashes in visible areas.  Eye: Equal, round and reactive, conjunctiva clear, lids normal.  ENMT: Lips without lesions, good dentition, oropharynx clear.  Neck: Trachea midline, no masses, no thyromegaly. No cervical or supraclavicular lymphadenopathy  Respiratory: Unlabored respiratory effort, lungs clear to auscultation, no wheezes, no ronchi.  Cardiovascular: Normal S1, S2, no murmur, no edema.  Abdomen: Soft, non-tender, no masses, no hepatosplenomegaly.  Psych: Alert and oriented x3, normal affect and mood.      Results          Assessment and Plan:   The following treatment plan was discussed    Assessment & Plan  1. Paroxysmal atrial fibrillation.  - She is doing well, taking her medications, and follows regularly with primary care and specialty care.  - Her DMV and FMLA forms were completed today.  - Reviewed her condition and ensured all necessary forms were filled out as per previous visits.  - No changes in medication or treatment plan were discussed.    2. Obesity.  - Currently on Zepbound 2.5 mg.  - Dosage will be increased to 5 mg.  - Advised to monitor weight and report any significant changes, especially if experiencing weight loss and mild nausea.  - Depending on progress, the dosage may be further increased to 7.5 mg during the next visit.    3. Hypertension.  - She is doing well, taking her medications, and follows regularly with primary care and specialty care.  - No new symptoms or concerns were reported.  - Continued current medication regimen and monitoring plan.    Follow-up  The patient will follow up on 07/07/2025.    Form completion: done today for DMV and for FMLA. See scanned    ORDERS:  1. Paroxysmal atrial fibrillation (HCC) (Primary)      2. Morbid obesity (HCC)    - Tirzepatide-Weight Management (ZEPBOUND) 5 MG/0.5ML Solution vial; Inject 0.5 mL under the skin every 7 days for 28 days.  Dispense: 2 mL; Refill: 1    3. Essential  hypertension      4. Anxiety      5. Encounter for completion of form with patient      () Today's E/M visit is associated with medical care services that serve as the continuing focal point for all needed health care services and/or with medical care services that are part of ongoing care related to a patient's single, serious condition, or a complex condition: This includes furnishing services to patients on an ongoing basis that result in care that is personalized to the patient. The services result in a comprehensive, longitudinal, and continuous relationship with the patient and involve delivery of team-based care that is accessible, coordinated with other practitioners and providers, and integrated with the broader health care landscape.             Follow Up: 12 weeks    Please note that this dictation was created using voice recognition software. I have made every reasonable attempt to correct obvious errors, but I expect that there are errors of grammar and possibly content that I did not discover before finalizing the note.      Attestation      Verbal consent was acquired by the patient to use [employer] ambient listening note generation during this visit Yes                  [1]   Current Outpatient Medications   Medication Sig Dispense Refill    Tirzepatide-Weight Management (ZEPBOUND) 5 MG/0.5ML Solution vial Inject 0.5 mL under the skin every 7 days for 28 days. 2 mL 1    metoprolol tartrate (LOPRESSOR) 25 MG Tab TAKE 1 TABLET BY MOUTH DAILY AS NEEDED (FOR PALPITATIONS). 90 Tablet 0    sertraline (ZOLOFT) 25 MG tablet TAKE 1 TABLET BY MOUTH EVERY DAY 90 Tablet 3    amLODIPine (NORVASC) 10 MG Tab TAKE 1 TABLET BY MOUTH EVERY  Tablet 3    lisinopril (PRINIVIL) 20 MG Tab TAKE 1 TABLET BY MOUTH EVERY DAY 90 Tablet 3    apixaban (ELIQUIS) 5mg Tab Take 1 Tablet by mouth 2 times a day. 200 Tablet 3    flecainide (TAMBOCOR) 100 MG Tab Take 1 Tablet by mouth 2 times a day. 180 Tablet 3     ondansetron (ZOFRAN ODT) 4 MG TABLET DISPERSIBLE Take 1 Tablet by mouth every 6 hours as needed for Nausea/Vomiting. 10 Tablet 0    albuterol 108 (90 Base) MCG/ACT Aero Soln inhalation aerosol INHALE 2 PUFFS BY MOUTH EVERY 6 HOURS AS NEEDED FOR SHORTNESS OF BREATH 8.5 Each 2    Multiple Vitamin (MULTIVITAMIN PO) Take 1 Tablet by mouth every morning.      B Complex Vitamins (VITAMIN B COMPLEX) Tab Take 1 Tablet by mouth every morning.      BIOTIN PO Take 1 Tablet by mouth every morning.      CALCIUM PO Take 1 Tablet by mouth every morning.      VITAMIN D PO Take 1 Tablet by mouth every morning.      fluticasone (FLONASE) 50 MCG/ACT nasal spray Administer 1 Spray into affected nostril(S) every day.       No current facility-administered medications for this visit.

## 2025-07-17 ENCOUNTER — APPOINTMENT (OUTPATIENT)
Dept: MEDICAL GROUP | Age: 60
End: 2025-07-17
Payer: COMMERCIAL

## 2025-07-24 DIAGNOSIS — I48.91 ATRIAL FIBRILLATION WITH RAPID VENTRICULAR RESPONSE (HCC): ICD-10-CM

## 2025-07-24 RX ORDER — APIXABAN 5 MG/1
5 TABLET, FILM COATED ORAL 2 TIMES DAILY
Qty: 200 TABLET | Refills: 0 | Status: SHIPPED | OUTPATIENT
Start: 2025-07-24

## 2025-07-29 ENCOUNTER — APPOINTMENT (OUTPATIENT)
Dept: MEDICAL GROUP | Age: 60
End: 2025-07-29
Payer: COMMERCIAL

## 2025-07-31 ENCOUNTER — APPOINTMENT (OUTPATIENT)
Dept: MEDICAL GROUP | Age: 60
End: 2025-07-31
Payer: COMMERCIAL

## 2025-08-05 ENCOUNTER — APPOINTMENT (OUTPATIENT)
Dept: MEDICAL GROUP | Age: 60
End: 2025-08-05
Payer: COMMERCIAL

## 2025-08-05 ASSESSMENT — FIBROSIS 4 INDEX: FIB4 SCORE: .959644917324600212

## 2025-08-15 ENCOUNTER — TELEPHONE (OUTPATIENT)
Dept: HEALTH INFORMATION MANAGEMENT | Facility: OTHER | Age: 60
End: 2025-08-15
Payer: COMMERCIAL

## 2025-08-19 ENCOUNTER — OFFICE VISIT (OUTPATIENT)
Dept: CARDIOLOGY | Facility: MEDICAL CENTER | Age: 60
End: 2025-08-19
Payer: COMMERCIAL

## 2025-08-19 VITALS
HEIGHT: 65 IN | OXYGEN SATURATION: 94 % | BODY MASS INDEX: 48.82 KG/M2 | HEART RATE: 50 BPM | RESPIRATION RATE: 18 BRPM | DIASTOLIC BLOOD PRESSURE: 64 MMHG | SYSTOLIC BLOOD PRESSURE: 112 MMHG | WEIGHT: 293 LBS

## 2025-08-19 DIAGNOSIS — I48.0 PAROXYSMAL ATRIAL FIBRILLATION (HCC): Primary | ICD-10-CM

## 2025-08-19 DIAGNOSIS — I10 ESSENTIAL HYPERTENSION: ICD-10-CM

## 2025-08-19 DIAGNOSIS — E66.813 CLASS 3 SEVERE OBESITY DUE TO EXCESS CALORIES WITH SERIOUS COMORBIDITY AND BODY MASS INDEX (BMI) OF 50.0 TO 59.9 IN ADULT: ICD-10-CM

## 2025-08-19 PROBLEM — E66.01 MORBID OBESITY (HCC): Status: RESOLVED | Noted: 2023-11-07 | Resolved: 2025-08-19

## 2025-08-19 PROCEDURE — 93005 ELECTROCARDIOGRAM TRACING: CPT | Mod: TC

## 2025-08-19 PROCEDURE — 99212 OFFICE O/P EST SF 10 MIN: CPT

## 2025-08-19 PROCEDURE — 3074F SYST BP LT 130 MM HG: CPT

## 2025-08-19 PROCEDURE — 99214 OFFICE O/P EST MOD 30 MIN: CPT

## 2025-08-19 PROCEDURE — 3078F DIAST BP <80 MM HG: CPT

## 2025-08-19 ASSESSMENT — ENCOUNTER SYMPTOMS
MUSCULOSKELETAL NEGATIVE: 1
NEUROLOGICAL NEGATIVE: 1
ORTHOPNEA: 0
SHORTNESS OF BREATH: 0
NERVOUS/ANXIOUS: 0
GASTROINTESTINAL NEGATIVE: 1
CONSTITUTIONAL NEGATIVE: 1
EYES NEGATIVE: 1
PND: 0
PALPITATIONS: 0
DEPRESSION: 0

## 2025-08-19 ASSESSMENT — FIBROSIS 4 INDEX: FIB4 SCORE: .959644917324600212

## 2025-08-20 LAB — EKG IMPRESSION: NORMAL

## 2025-08-20 PROCEDURE — 93010 ELECTROCARDIOGRAM REPORT: CPT | Performed by: INTERNAL MEDICINE

## (undated) DEVICE — TOWEL STOP TIMEOUT SAFETY FLAG (40EA/CA)

## (undated) DEVICE — Device

## (undated) DEVICE — GLOVE BIOGEL PI INDICATOR SZ 6.0 SURGICAL PF LF -(200PR/CA)

## (undated) DEVICE — CANISTER SUCTION 3000ML MECHANICAL FILTER AUTO SHUTOFF MEDI-VAC NONSTERILE LF DISP  (40EA/CA)

## (undated) DEVICE — SET LEADWIRE 5 LEAD BEDSIDE DISPOSABLE ECG (1SET OF 5/EA)

## (undated) DEVICE — CANNULA O2 COMFORT SOFT EAR ADULT 7 FT TUBING (50/CA)

## (undated) DEVICE — GLOVE BIOGEL PI INDICATOR SZ 6.5 SURGICAL PF LF - (50/BX 4BX/CA)

## (undated) DEVICE — SPECIMEN PLASTIC CONVERTOR - (10/CA)

## (undated) DEVICE — SODIUM CHL IRRIGATION 0.9% 1000ML (12EA/CA)

## (undated) DEVICE — TUBING CLEARLINK DUO-VENT - C-FLO (48EA/CA)

## (undated) DEVICE — GLOVE BIOGEL PI ORTHO SZ 6 SURGICAL PF LF (40PR/BX)

## (undated) DEVICE — LACTATED RINGERS INJ 1000 ML - (14EA/CA 60CA/PF)

## (undated) DEVICE — GVL 3 STAT DISPOSABLE - (10/BX)

## (undated) DEVICE — GOWN WARMING STANDARD FLEX - (30/CA)

## (undated) DEVICE — SET TUBING AQUILEX OUT-FLOW MYOSURE (10EA/BX)

## (undated) DEVICE — SUCTION INSTRUMENT YANKAUER BULBOUS TIP W/O VENT (50EA/CA)

## (undated) DEVICE — SET TUBING AQUILEX IN-FLOW MYOSURE (10EA/BX)

## (undated) DEVICE — KIT  I.V. START (100EA/CA)

## (undated) DEVICE — SODIUM CHL. IRRIGATION 0.9% 3000ML (4EA/CA 65CA/PF)

## (undated) DEVICE — SENSOR OXIMETER ADULT SPO2 RD SET (20EA/BX)

## (undated) DEVICE — DEVICE REMOVAL MYOSURE LITE TISSUE (3EA/BX)

## (undated) DEVICE — MASK OXYGEN VNYL ADLT MED CONC WITH 7 FOOT TUBING  - (50EA/CA)

## (undated) DEVICE — WATER IRRIGATION STERILE 1000ML (12EA/CA)

## (undated) DEVICE — MAT PATIENT POSITIONING PREVALON (10EA/CA)

## (undated) DEVICE — CANISTER SUCTION RIGID RED 1500CC (40EA/CA)

## (undated) DEVICE — TUBE E-T HI-LO CUFF 7.0MM (10EA/PK)

## (undated) DEVICE — TUBE CONNECTING SUCTION - CLEAR PLASTIC STERILE 72 IN (50EA/CA)

## (undated) DEVICE — PAD SANITARY 11IN MAXI IND WRAPPED  (12EA/PK 24PK/CA)

## (undated) DEVICE — SLEEVE VASO CALF MED - (10PR/CA)